# Patient Record
Sex: FEMALE | Race: WHITE
[De-identification: names, ages, dates, MRNs, and addresses within clinical notes are randomized per-mention and may not be internally consistent; named-entity substitution may affect disease eponyms.]

---

## 2018-04-20 ENCOUNTER — HOSPITAL ENCOUNTER (EMERGENCY)
Dept: HOSPITAL 25 - UCCORT | Age: 68
Discharge: HOME | End: 2018-04-20
Payer: MEDICARE

## 2018-04-20 VITALS — DIASTOLIC BLOOD PRESSURE: 82 MMHG | SYSTOLIC BLOOD PRESSURE: 142 MMHG

## 2018-04-20 DIAGNOSIS — Z88.0: ICD-10-CM

## 2018-04-20 DIAGNOSIS — M43.6: Primary | ICD-10-CM

## 2018-04-20 DIAGNOSIS — Z88.8: ICD-10-CM

## 2018-04-20 DIAGNOSIS — M54.12: ICD-10-CM

## 2018-04-20 DIAGNOSIS — Z87.891: ICD-10-CM

## 2018-04-20 PROCEDURE — G0463 HOSPITAL OUTPT CLINIC VISIT: HCPCS

## 2018-04-20 PROCEDURE — 99212 OFFICE O/P EST SF 10 MIN: CPT

## 2018-04-20 NOTE — UC
Neck Pain HPI





- HPI Summary


HPI Summary: 





patient woke this morning with pain in the right side of her neck and 

intermittent tingling down her hand, she is morbildly obese, forward head 

posture, pain along the upper trap and scalenes.  denies any facila droop, one 

side weakness or change in speech. 





- History of Current Complaint


Hx Obtained From: Patient, Family/Caretaker


Pregnant?: No


Onset/Duration Of Injury/Symptoms: Hours


Mechanism Of Injury: No Known Trauma


Timing: Constant - radiating painneck ache


Onset/Duration: Sudden Onset, Lasting Hours


Severity: Moderate


Pain Intensity: 4


Character: Sharp, Aching


Aggravating Factors: Position, Movement


Alleviating Factors: Position, Massage


Associated Signs & Symptoms: Positive: Paresthesia





<Erin Jackson - Last Filed: 04/20/18 14:14>





<Enedelia Her - Last Filed: 04/20/18 14:30>





- History of Current Complaint


Chief Complaint: UCGeneralIllness


Stated Complaint: PAIN BEHIND (R) EAR/NECK, COUGH


Time Seen by Provider: 04/20/18 13:18





- Allergies/Home Medications


Allergies/Adverse Reactions: 


 Allergies











Allergy/AdvReac Type Severity Reaction Status Date / Time


 


Penicillins Allergy  Hives Verified 04/20/18 13:22


 


piroxicam [From Feldene] Allergy  Blisters Verified 04/20/18 13:22











Home Medications: 


 Home Medications





Albuterol HFA INHALER* [Ventolin HFA Inhaler*] 1 puff INH Q4H PRN 04/20/18 [

History Confirmed 04/20/18]


Allopurinol TAB* [Zyloprim 100 MG TAB*] 200 mg PO DAILY 04/20/18 [History 

Confirmed 04/20/18]


Atorvastatin* [Lipitor*] 10 mg PO DAILY 04/20/18 [History Confirmed 04/20/18]


Beclomethasone 80 MCG MDI(NF) [Qvar 80 MCG MDI(NF)] 2 puff INH DAILY 04/20/18 [

History Confirmed 04/20/18]


Ferrous Sulfate TAB* 325 mg PO DAILY 04/20/18 [History Confirmed 04/20/18]


Furosemide TAB* [Lasix TAB*] 40 mg PO DAILY 04/20/18 [History Confirmed 04/20/18

]


Gabapentin CAP(*) [Neurontin 100 mg CAP(*)] 100 mg PO BID 04/20/18 [History 

Confirmed 04/20/18]


Nystatin CREAM* 1 applic TOPICAL TID 04/20/18 [History Confirmed 04/20/18]


Pantoprazole TAB (NF) [Protonix TAB (NF)] 40 mg PO DAILY 04/20/18 [History 

Confirmed 04/20/18]


Potassium Chlor TAB* [Klor Con ER TAB*] 10 meq PO DAILY 04/20/18 [History 

Confirmed 04/20/18]


Spironolactone TAB* [Aldactone TAB*] 12.5 mg PO DAILY 04/20/18 [History 

Confirmed 04/20/18]


Tiotropium CAP.INH* [Spiriva CAP.INH*] 2 cap.inh INH DAILY 04/20/18 [History 

Confirmed 04/20/18]


amLODIPine TAB* [Norvasc 5 mg TAB*] 5 mg PO DAILY 04/20/18 [History Confirmed 04 /20/18]











PMH/Surg Hx/FS Hx/Imm Hx


Previously Healthy: Yes





- Surgical History


Surgical History: Yes


Surgery Procedure, Year, and Place: stents.  T&A.  marta.  appy.  L carotid 

[place] of [place] coil.  Kidney stone blasted.  2015 bypass.  mitral valve repair





- Family History


Known Family History: Positive: Cardiac Disease, Hypertension





- Social History


Alcohol Use: Occasionally


Substance Use Type: None


Smoking Status (MU): Former Smoker


Type: Cigarettes


When Did the Patient Quit Smoking/Using Tobacco: 30 years ago





- Immunization History


Most Recent Influenza Vaccination: 2014


Most Recent Tetanus Shot: current (2010?)


Most Recent Pneumonia Vaccination: current





<Erin Jackson - Last Filed: 04/20/18 14:14>





Review Of Systems


Constitutional: Positive: Negative


Skin: Positive: Negative


Eyes: Positive: Negative


ENT: Positive: Negative


Respiratory: Positive: Negative


Cardiovascular: Positive: Negative


Gastrointestinal: Positive: Negative


Genitourinary: Positive: Negative


Musculoskeletal: Positive: Arthralgia, Decreased ROM, Myalgia


Neurological: Positive: Negative


Psychological: Positive: Negative


All Other Systems Reviewed And Are Negative: Yes





<Erin Jackson - Last Filed: 04/20/18 14:14>





Physical Exam


Triage Information Reviewed: Yes


Appearance: Well-Appearing, Pain Distress, Obese


Vital Signs: 


 Initial Vital Signs











Temp  98 F   04/20/18 13:20


 


Pulse  71   04/20/18 13:20


 


Resp  20   04/20/18 13:20


 


BP  142/82   04/20/18 13:20


 


Pulse Ox  99   04/20/18 13:20











Vital Signs Reviewed: Yes


Eye Exam: Normal


ENT Exam: Normal


Dental Exam: Normal


Neck: Positive: Tenderness @ - right side of neck musculature


Respiratory Exam: Normal


Respiratory: Positive: Chest non-tender, Lungs clear, Normal breath sounds


Cardiovascular Exam: Normal


Cardiovascular: Positive: RRR, No Murmur, Pulses Normal


Abdominal Exam: Normal


Abdomen Description: Positive: Nontender, No Organomegaly, Soft


Bowel Sounds: Positive: Present


Musculoskeletal Exam: Normal


Musculoskeletal: Positive: Strength Intact, No Edema, ROM Limited @ - in neck 

rot, lat flx and ext


Neurological Exam: Normal


Neurological: Positive: Alert


Psychological Exam: Normal


Skin Exam: Normal





<Erin Jackson - Last Filed: 04/20/18 14:14>


Vital Signs: 


 Initial Vital Signs











Temp  98 F   04/20/18 13:20


 


Pulse  71   04/20/18 13:20


 


Resp  20   04/20/18 13:20


 


BP  142/82   04/20/18 13:20


 


Pulse Ox  99   04/20/18 13:20














<Enedelia Her - Last Filed: 04/20/18 14:30>





Neck Pain Course/Dx





- Course


Course Of Treatment: hx obtained, exam performed ,meds reviewed, ROM assessed, 

treated for cervical radicuopathy





- Differential Dx/Diagnosis


Differential Dx/HQI/PQRI: Cervical Fracture, Sprain, Strain, Torticollis


Provider Diagnoses: torticollis.  cervical radicuopathy





<Erin Jackson - Last Filed: 04/20/18 14:14>





Discharge





- Sign-Out/Discharge


Documenting (check all that apply): Discharge





- Billing Disposition and Condition


Condition: STABLE


Disposition: HOME





<Erin Jackson - Last Filed: 04/20/18 14:14>





- Billing Disposition and Condition


Condition: STABLE


Disposition: HOME





<Enedelia Her - Last Filed: 04/20/18 14:30>





- Discharge Plan


Condition: Stable


Disposition: HOME


Patient Education Materials:  Cervical Radiculopathy (ED)


Referrals: 


Nicole Murdock MD [Primary Care Provider] - 


Additional Instructions: 


1. warm pack to the neck, use the sling to rest the muscles of the shoulder and 

neck


2. I recommend follow up with massage, gentle stretching, tylenol and sleep 

poisitioning


3. Follow up with any increased weakness or pain is not managed with this line 

of treatment





Attestation Statement


User Type: Provider - I was available for consult. This patient was seen by the 

CLAIRE. The patient was not presented to, seen by, or examined by me. -Laura





<Enedelia Her - Last Filed: 04/20/18 14:30>

## 2020-02-27 ENCOUNTER — HOSPITAL ENCOUNTER (EMERGENCY)
Dept: HOSPITAL 25 - UCCORT | Age: 70
Discharge: HOME | End: 2020-02-27
Payer: MEDICARE

## 2020-02-27 VITALS — SYSTOLIC BLOOD PRESSURE: 110 MMHG | DIASTOLIC BLOOD PRESSURE: 64 MMHG

## 2020-02-27 DIAGNOSIS — Z91.09: ICD-10-CM

## 2020-02-27 DIAGNOSIS — Z88.0: ICD-10-CM

## 2020-02-27 DIAGNOSIS — Z88.8: ICD-10-CM

## 2020-02-27 DIAGNOSIS — S61.512A: Primary | ICD-10-CM

## 2020-02-27 DIAGNOSIS — Z95.1: ICD-10-CM

## 2020-02-27 DIAGNOSIS — Z95.2: ICD-10-CM
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
## 2020-02-27 DIAGNOSIS — Z79.82: ICD-10-CM
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
## 2020-02-27 DIAGNOSIS — Z87.891: ICD-10-CM

## 2020-02-27 PROCEDURE — G0463 HOSPITAL OUTPT CLINIC VISIT: HCPCS

## 2020-02-27 PROCEDURE — 99212 OFFICE O/P EST SF 10 MIN: CPT

## 2020-02-27 NOTE — XMS REPORT
Continuity of Care Document (CCD)

 Created on:January 3, 2020



Patient:Teri Heaton

Sex:Female

:1950

External Reference #:[id number]





Demographics







 Address  [address]



   [place] [address]

 

 Home Phone  [phone]

 

 Mobile Phone  [phone]

 

 Email Address  [email or web address]

 

 Preferred Language  en

 

 Marital Status  Not  or 

 

 Mor[employer] Affiliation  Unknown

 

 Race  White

 

 Ethnic Group  Not  or 









Author







 Name  Nicole Murdock MD

 

 Address  [address] [place] [address]









Care Team Providers







 Name  Role  Phone

 

 Jimbo Haley MD - Cardiovascular  Care Team Information   [phone]
[phone]



 Disease    

 

 [facility]  Care Team Information   [phone]

 

 MD Landon, Pilo - Nephrology  Care Team Information   [phone]

 

 Inez Ponce DO - Hematology &  Care Team Information   [phone]
[phone]



 Oncology    

 

 Robert Sánchez MD - Cardiovascular  Care Team Information   [phone]
[phone]



 Disease    

 

 Radha Machado - Gastroenterology  Care Team Information   [phone]
[phone]

 

 Quinton Borja MD -  Care Team Information   [phone]



 Cardiovascular Disease    

 

 Maren Kerns - Gastroenterology  Care Team Information   [phone]
[phone]

 

 [facility] - Physical  Care Team Information   [phone]
[phone]



 Therapy    

 

 Onur Valencia MD  Care Team Information   [phone]

 

 Oren Roque MD - Vascular  Care Team Information   [phone]
[phone]



 Surgery    

 

 August Castillo MD - Allergy & Immunology  Care Team Information   [phone]
[phone]

 

 Bryan Layne DO - Pulmonary  Care Team Information   [phone]
[phone]



 Disease    

 

 Matt David DR - Hematology &  Care Team Information   [phone]
[phone]



 Oncology    

 

 Dany Loza DR - Otolaryngology  Care Team Information   [phone]
[phone]









Problems







 Active Problems  Provider  Date

 

 Proteinuria  Nicole Murdokc MD  Onset: 10/14/2014

 

 Coronary arteriosclerosis  Nicole Murdock MD  Onset: 10/14/2014

 

 Hypothyroidism  Nicole Murdock MD  Onset: 2011

 

 Obstructive sleep apnea syndrome  Nicole Murdock MD  Onset: 03/10/2011

 

 Allergic rhinitis due to pollen  Nicole Murdock MD  Onset: 2010

 

 Candidiasis of skin and nails  Nicole Murdock MD  Onset: 2010

 

 Senile hyperkeratosis  Nicole Murdock MD  Onset: 2008

 

 Allergic asthma without status asthmaticus  Nicole Murdock MD  Onset: 2007

 

 Vitamin D deficiency  Nicole Murdock MD  Onset: 2006

 

 Osteochondropathy  Nicole Murdock MD  Onset: 2006

 

 Liver function tests abnormal  Nicole Murdock MD  Onset: 2006

 

 Helicobacter pylori  Nicole Murdock MD  Onset: 2006

 

 Carotid artery occlusion  Nicole Murdock MD  Onset: 2006

 

 Aortic valve disorder  Nicole Murdock MD  Onset: 09/15/2005

 

 Benign neoplasm of colon  Nicole Murdock MD  Onset: 2005

 

 Melena  Nicole Murdock MD  Onset: 2005

 

 Benign essential hypertension  Nicole Murdock MD  Onset: 2005

 

 Mixed hyperlipidemia  Nicole Murdock MD  Onset: 2005

 

 Type 2 diabetes mellitus  Nicole Murdock MD  Onset: 2005

 

 Pure hypercholesterolemia    Onset: 2015

 

 Osteoporosis  Nicole Murdock MD  Onset: 2015

 

 Chronic kidney disease stage 3  Nicole Murdock MD  Onset: 2015

 

 Essential hypertension  Nicole Murdock MD  Onset: 2015

 

 Mitral valve disorder  Nicole Murdock MD  Onset: 2015

 

 Ulcerative colitis  Nicole Murdock MD  Onset: 2015

 

 Atherosclerotic heart disease of native  Nicole Murdock MD  Onset: 2015



 coronary artery without angina pectoris    

 

 Sinus node dysfunction  Nicole Murdock MD  Onset: 2016

 

 Sleep apnea  Nicole Murdock MD  Onset: 2016

 

 Type 2 diabetes mellitus with diabetic  Nicole Murdock MD  Onset: 2016



 polyneuropathy    

 

 Diabetes mellitus due to underlying condition  Nicole Murdock MD  Onset: 



 with mild nonproliferative diabetic    



 retinopathy without macular edema    

 

 Gout  Nicole Murdock MD  Onset: 2016

 

 Ex-smoker  Nicole Murdock MD  Onset: 2016

 

 Uncomplicated moderate persistent asthma  Nicole Murdock MD  Onset: 2017

 

 Diabetes mellitus  Nicole Murdock MD  Onset: 2018

 

 Heart valve replacement  Nicole Murdock MD  Onset: 2018

 

 Chronic diastolic heart failure  Nicole Murdock MD  Onset: 2018

 

 Congenital anomaly of peripheral blood vessel  Nicole Murdock MD  Onset: 

 

 Allergic rhinitis  Nicole Murdock MD  Onset: 2018

 

 Morbid obesity  Nicole Murdock MD  Onset: 2018

 

 Mild nonproliferative diabetic retinopathy  Nicole Murdock MD  Onset: 2018

 

 Cardiac pacemaker in situ  Nicole Murdock MD  Onset: 2019







Social History







 Type  Date  Description  Comments

 

 Birth Sex    Unknown  

 

 ETOH Use    Occasionally consumes  



     alcohol  

 

 Tobacco Use  Start: 61  Patient is a former  3/13/17 started age



   End: 84  smoker  12, started as a few



       per day, gradually



       built, by adult was



       1ppd , smoked up to



       2.5 ppd for at least



       3-4 years, quit .



       Not eligible for lung



       cancer screening. [facility]



       Document: 17 -



       Followup: [facility]



       

 

 Smoking Status  Reviewed:  Patient is a former  3/13/17 started age



   18  smoker  13, started as a few



       per day, gradually



       built, by adult was



       1ppd , smoked up to



       2.5 ppd for at least



       3-4 years, quit .



       Not eligible for lung



       cancer screening. [facility]



       Document: 17 -



       Followup: [facility]



       

 

 Exercise    Exercises sporadically  counselled 150min per



 Type/Frequency      week 70045 steps per



       day  3/13/17







Allergies, Adverse Reactions, Alerts







 Active Allergies  Reaction  Severity  Comments  Date

 

 Penicillin      Rash  2015

 

 Latex        2017

 

 Feldene      Blisters  2015

 

 Penicillin G        2016

 

 Dogs        2015

 

 Milk-Related Compounds        2015

 

 Pollen        2015

 

 Pollen Extract        2015

 

 Piroxicam  Rash  Mild    2014

 

 Penicillins  Hives, Rash  Mild    2014

 

 Dust        2018

 

 Environmental        2018

 

 Trees        2018

 

 Mold        2019







Medications







 Active Medications  SIG  Qnty  Indications  Ordering Provider  Date

 

 Furosemide  1 by mouth  30tabs  N18.3  Nicole Murdock,  2019



         40mg Tablets  every day in      MD  



   the        



   morning(instead        



   of 80mg daily)        









 I10









 Allopurinol  1 by mouth every  30tabs  M10.9  Nicole Murdock MD  2019



         300mg Tablets  day        



           

 

 Atorvastatin Calcium  take 1 tablet by  30tabs  E78.2  Nicole Murdock MD  



                  20mg  mouth every day        



 Tablets          



           









 I25.10









 Potassium Chloride ER  take 1 tablet by  30tabs  I10  Nicole Murdock MD  



                   10Meq  mouth daily with        



 Tablets ER  lasix        



           

 

 Spiriva Respimat  2 puffs in the    J45.40  August Castillo MD  2018



              2.5mcg/Act  morning        



 Aerosol          



           

 

 Levothyroxine Sodium  take 1 tablet  30tabs  E03.9  Nicole Murdock MD  



                  125mcg  daily in the        



 Tablets  morning on an        



   empty stomach. no        



   medications/ food        



   for 30 minutes        

 

 Fluticasone Propionate  use 2 sprays in    J30.9  August Castillo MD  2017



   each nostril        



 50mcg/Act Suspension  daily        



           

 

 Pantoprazole Sodium  take 1 tablet by  30tabs  D62  Nicole Murdock MD  



                 40mg  mouth every day        



 Tablets DR  30min before a        



   meal        









 I25.10









 Accu-Chek Lisseth Plus  test daily and as  100units  E11.8  Nicole Murdock,  



   needed      MD  



 Strips          



           

 

 Accu-Chek Lisseth  test daily and as  1units  E11.8  Nicole Murdock,  2016



 Connect  directed      MD  



      w/Device Kit          



           

 

 Vitamin D3  1 by mouth daily  90tabs  E55.9  Nicole Murdock,  2016



         1000Unit  with dinner with      MD  



 Tablets  meat fat oil        



           

 

 Nystatin  wash and dry  45gm  B37.2  Nicole Murdock,  2016



       [id number]  skin, apply 3      MD  



 Cream  times a day to        



   affected areas        



   until resolved        

 

 Casper Microlet Lancets  test daily and as  100units  E11.8  Nicole Murdock,  
2016



   needed      MD  



 Misc          

 

 Vitamin C  1 by mouth with  30units  D50.9  Nicole Murdock,  2015



        500mg Chewtabs  iron      MD  



           

 

 Multivitamins  1 by mouth every      Nicole Murdock,  2015



             Capsules  day      MD  



           

 

 Irbesartan  take 1 tablet by  30tabs  I25.10  Nicole Murdock,  10/09/2015



         75mg Tablets  mouth every day      MD  



   at bedtime        









 I10









 Apap  care with     G47.30  Nicole Murdock MD  2015



  Device  cata        

 

 Montelukast Sodium  1 po qhs  28tabs  J30.9  August Castillo MD  2010



               10mg          



 Tablets          

 

 Ventolin HFA  2 puffs by mouth  1units  J45.40  Bryan Layne,   



         108mcg/Act  every 4 hours as        



 Aerosol  needed        

 

 Pataday  one drop each    J30.9  Garfield Maldonado,  



    0.2% Solution  eye every day      M.D.  



           

 

 Spironolactone  1 tablet  by    I25.10  Quinton Borja,  



           25mg Tablets  mouth every day      MD  



           









 I10









 Aspirin Low Dose  chew 81 mg daily    E08.329  Nicole Murdock MD  



           81mg Chewtabs          



           









 I25.10









 Azelastine HCL (Nasal)  1-2 sprays into each    J30.9  August Castillo MD  



                    0.1%  nostril 2 (two)        



 Solution  times a day Use in        



   each nostril as        



   directed        

 

 Clindamycin HCL  2 PO 1 Hour Prior To    Z95.2  Quinton Borja MD  



             300mg  Dental Procedures.        



 Capsules          



           

 

 Xolair  inject 225mg every 2    J45.40  Unknown  



    75mg/0.5ML Soln  weeks        



 Prefill Syringe          



           

 

 Dulera  2 puff twice a day    J45.40  Unknown  



    100-5mcg/Act Aerosol          



           

 

 Levalbuterol HCL  Inhale 1 Vial Via    J45.40  Unknown  



              1.25mg/3ML  Nebulizer Every 4 To        



 Nebulizer  6 Hours For        



   Shortness Of Breath,        



   Cough Or Wheeze        







Medications Administered in Office







 Medication  SIG  Qnty  Indications  Ordering Provider  Date

 

 Gentamicin <80 MG        Schedule, Nurses  2017



       Injection          

 

 Depo Medrol 40 MG        Nicole Murdock MD  2016



       Injection          







Immunizations







 CPT Code  Status  Date  Vaccine  Reaction  Lot #

 

 [id number]  Given  2019  Influenza Vac, Quadrivalent,    



       Split, 0.5mL Dosage, Im Use    

 

 26753  Given  2018  Influenza Virus    



       Vaccine,Quadrivalent,Split,Preser    



       v Free, 0.5mL,Im    

 

   Given  2017  Afluria Imunization  Given At Pharmacy  

 

 29335  Given  2016  Influenza Virus    



       Vaccine,Quadrivalent,Split,Preser    



       v Free, 0.5mL,Im    

 

 70058  Given  2015  Influenza Virus    



       Vaccine,Quadrivalent,Split,Preser    



       v Free, 0.5mL,Im    

 

 88731  Given  2015  Prevnar 13 Pneumococal Conjugate    [id number]



       Vaccine    

 

 54612  Given  2015  Zoster (Zostavax)    

 

 86550  Given  10/14/2014  Pneumococcal 23 Immunization    



       Adult Or Immunosuppressed Patient    

 

 90926  Given  10/01/2014  Influenza Virus    



       Vaccine,Quadrivalent,Split,Preser    



       v Free, 0.5mL,Im    

 

 46011  Given  2014  Influenza Intranasal Vaccine,    



       Live Virus    

 

 26079  Given  2012  Afluria Or Fluvirin Flu Vac    



       Intramuscular    

 

 05054  Given  2012  Tdap (Adacel) Ages 7 And Above  ADACEL  



       Only    

 

 39547  Given  2012  Pneumococcal 23 Immunization    



       Adult Or Immunosuppressed Patient    

 

 16459  Given  2011  Afluria Or Fluvirin Flu Vac    



       Intramuscular    

 

 77621  Given  10/18/2010  Afluria Or Fluvirin Flu Vac    



       Intramuscular    

 

 81801  Given  2008  Pneumococcal 23 Immunization    



       Adult Or Immunosuppressed Patient    

 

 69684  Given  11/10/2004  Tetanus And Diptheria Toxoid 7    



       Years And Older Preserv Free    

 

 16718  Given  10/20/1999  Pneumococcal 23 Immunization    



       Adult Or Immunosuppressed Patient    









 









 80156  Refused  2020  Shingrix (Shingles) Zoster  AWARE CAN GET AT 
PHARMACY  



       Vaccine HZV, Recombinant,    



       Subunit, Adj    

 

 75821  Refused  2019  Shingrix (Shingles) Zoster  aware needs in 2020  



       Vaccine HZV, Recombinant,    



       Subunit, Adj    

 

 66625  Refused  2018  Shingrix (Shingles) Zoster  not due until   



       Vaccine HZV, Recombinant,    



       Subunit, Adj    

 

   Refused  2018  Flu Vaccine NOS  will get at free clinic on  



           







Vital Signs







 Date  Vital  Result  Comment

 

 2020 10:04am  Weight  238.00 lb  









 Heart Rate  76 /min  

 

 BP Systolic  142 mmHg  

 

 BP Diastolic  86 mmHg  

 

 Respiratory Rate  20 /min  

 

 Height  64.5 inches  5'4.50"

 

 BMI (Body Mass Index)  40.2 kg/m2  









 2019  2:34pm  Body Temperature  97.1 F  









 Weight  241.00 lb  

 

 Heart Rate  70 /min  

 

 BP Systolic  138 mmHg  

 

 BP Diastolic  88 mmHg  

 

 Respiratory Rate  20 /min  

 

 Height  64.5 inches  5'4.50"

 

 O2 % BldC Oximetry  99 %  ra

 

 BMI (Body Mass Index)  40.7 kg/m2  







Results







 Test  Acquired Date  Facility  Test  Result  H/L  Range  Note

 

 Hemoglobin A1c  2019  [facility]  Hemoglobin A1c  5.6 %    4.1-5.9  









 Estimated Average Glucose Calc  114 mg/dL      









 Laboratory test finding  2019  [facility]  TSH  2.53 uIU/mL    0.35-4.94  

 

 Lipid  2019  [facility]  Cholesterol  159 mg/dL      









 Triglycerides  199 mg/dL      

 

 HDL  43 mg/dL    35-85  1

 

 Chol/ HDL Ratio  3.7 ratio    3.7-5.6  

 

 VLDL  40 mg/dL  High  2-29  

 

 LDL (Calc)  76 mg/dL    20-99  2









 Laboratory test finding  2019  [facility]  CPK  109 U/L      









 Uric Acid  5.4 mg/dL    2.6-7.6  









 Comprehensive Met Panel-[facility]CMG  2019  [facility]  Sodium  142 mmol/L    135-
146  3









 Potassium  4.2 mmol/L    3.5-5.2  

 

 Chloride#  104 mmol/L      4

 

 Carbon Dioxide  26 mmol/L    24-34  

 

 Calcium  8.9 mg/dL    8.5-10.5  5

 

 Glucose  111 mg/dL  High    

 

 BUN  47 mg/dL  High  6-26  

 

 Creatinine  2.2 mg/dL  High  0.5-1.4  

 

 Total Protein  6.2 g/dL    6.0-8.0  

 

 Albumin  4.1 g/dL    3.6-4.9  

 

 Globulin  2.1 g/dL    2.0-3.5  

 

 A/G Ratio  2.0 Ratio    1.0-2.2  

 

 Total Bilirubin  0.4 mg/dL    0.1-1.3  

 

 Alkaline Phosphatase  122 U/L      

 

 Alt  19 U/L    3-42  

 

 Ast  20 U/L    8-42  

 

 Anion Gap  12 mmol/L    5-15  6

 

 Female Egfr  22  Low  >60  7

 

 Male Egfr  29  Low  >60  8









 CBC With Auto Diff  2019  [facility]  WBC  8.1 K/uL    4.1-11.0  9









 RBC  3.72 M/uL  Low  4.00-5.40  10

 

 Hemoglobin  11.6 gm/dL  Low  12.0-16.0  11

 

 Hematocrit  35.9 %  Low  36.0-47.0  12

 

 MCV  96.5 fL  High  80.0-95.0  13

 

 MCH  31.3 pg    27.0-32.0  14

 

 MCHC  32.4 g/dL    32.0-36.0  15

 

 RDW  22.3 %  High  10.5-14.5  16

 

 PLT Count  196 K/ul    150-400  17

 

 MPV  8.7 FL    7.1-10.7  









 Manual Differential  2019  [facility]  Neutrophils  73 %    35-75  









 Band  0 %    0-11  

 

 Lymphocytes  18 %    16-52  

 

 Monocytes  7 %    0-8  

 

 Eosinophils  2 %    0-5  

 

 Basophils  0 %    0-4  

 

 Abs Neutrophils#  5.9 K/ul    1.8-7.7  

 

 Abs Lymphocytes#  1.5 K/ul    1.2-4.8  

 

 Abs Monocytes#  0.6 K/ul    0.0-0.8  

 

 Abs Eosinophils#  0.2 K/ul    0.0-0.5  

 

 Abs Basophils#  0.0 K/ul    0.0-0.3  

 

 Abs BandCells#  0.0 K/ul    0.0-1.2  

 

 Anisocytosis  3    None Seen  

 

 Macrocytosis  2    None Seen  

 

 Microcytosis  1    None Seen  

 

 Platelet Estimate  NORMAL    Normal  

 

 RBC Morphology  ABNORMAL  Abnormal  Normal  









 Iron Panel  2019  [facility]  Iron, Total  40 g/dL  Low    









 Transferrin  214.0 mg/dL    203.0-362.0  

 

 Tibc (calc)  300 g/dL    261-478  

 

 % Iron Saturation  13.4 %    13.0-45.0  









 Hemoglobin A1c  2019  [facility]  Hemoglobin A1c  6.1 %  High  4.1-5.9  









 Estimated Average Glucose Calc  128 mg/dL      









 Laboratory test finding  2019  [facility]  TSH  0.69 uIU/mL    0.35-4.94  

 

 Lipid  2019  [facility]  Cholesterol  140 mg/dL      









 Triglycerides  186 mg/dL      

 

 HDL  35 mg/dL    35-85  18

 

 Chol/ HDL Ratio  4.1 ratio    3.7-5.6  

 

 VLDL  37 mg/dL  High  2-29  

 

 LDL (Calc)  68 mg/dL    20-99  19









 Laboratory test finding  2019  [facility]  CPK  64 U/L      









 Uric Acid  5.2 mg/dL    2.6-7.6  









 Comprehensive Met Panel-[facility]CMG  2019  [facility]  Sodium  131 mmol/L  Low  135
-146  20









 Potassium  4.4 mmol/L    3.5-5.2  

 

 Chloride#  92 mmol/L  Low    21

 

 Carbon Dioxide  25 mmol/L    24-34  

 

 Calcium  9.0 mg/dL    8.5-10.5  22

 

 Glucose  111 mg/dL  High    

 

 BUN  44 mg/dL  High  6-26  

 

 Creatinine  2.4 mg/dL  High  0.5-1.4  

 

 Total Protein  6.0 g/dL    6.0-8.0  

 

 Albumin  3.7 g/dL    3.6-4.9  

 

 Globulin  2.3 g/dL    2.0-3.5  

 

 A/G Ratio  1.6 Ratio    1.0-2.2  

 

 Total Bilirubin  0.4 mg/dL    0.1-1.3  

 

 Alkaline Phosphatase  109 U/L      

 

 Alt  13 U/L    3-42  

 

 Ast  15 U/L    8-42  

 

 Anion Gap  14 mmol/L    5-15  23

 

 Female Egfr  20  Low  >60  24

 

 Male Egfr  27  Low  >60  25









 CBC With Auto Diff  2019  [facility]  WBC  6.5 K/uL    4.1-11.0  









 RBC  3.23 M/uL  Low  4.00-5.40  

 

 Hemoglobin  9.8 gm/dL  Low  12.0-16.0  

 

 Hematocrit  30.2 %  Low  36.0-47.0  

 

 MCV  93.4 fL    80.0-97.0  

 

 MCH  30.3 pg    27.0-32.0  

 

 MCHC  32.4 g/dL    32.0-36.0  

 

 RDW  17.5 %  High  11.5-14.5  

 

 PLT Count  301 K/ul    140-400  

 

 MPV  7.5 FL    7.1-10.7  









 Manual Differential  2019  [facility]  Neutrophils  78 %  High  35-75  









 Band  0 %    0-11  

 

 Lymphocytes  13 %  Low  16-52  

 

 Monocytes  3 %    0-8  

 

 Eosinophils  6 %  High  0-5  

 

 Basophils  0 %    0-4  

 

 Abs Neutrophils#  5.1 K/ul    1.8-7.7  

 

 Abs Lymphocytes#  0.8 K/ul  Low  1.2-4.8  

 

 Abs Monocytes#  0.2 K/ul    0.0-0.8  

 

 Abs Eosinophils#  0.4 K/ul    0.0-0.5  

 

 Abs Basophils#  0.0 K/ul    0.0-0.3  

 

 Abs BandCells#  0.0 K/ul    0.0-1.2  

 

 Anisocytosis  1    None Seen  

 

 Macrocytosis  1    None Seen  

 

 Platelet Estimate  NORMAL    Normal  

 

 Polychromasia  1    None Seen  

 

 Spherocytes  FEW  Abnormal  None Seen  









 Iron Panel  2019  [facility]  Iron, Total  20 g/dL  Low    









 Transferrin  252.0 mg/dL    203.0-362.0  

 

 Tibc (calc)  353 g/dL    261-478  

 

 % Iron Saturation  5.7 %  Low  13.0-45.0  









 1  Per NCEP ATP III Guidelines:



   Results lower than 40 mg/dL are suggestive of increased risk for



   coronary artery disease.  Results > or = to 60 mg/dL are considered a



   negative risk factor.

 

 2  Per NCEP ATP III Guidelines:



   Normal Population <130



   Patients with medical conditions: CHD/DM



   Optimal: <100



   Borderline high: 130-159



   High: 160-189



   Very high: >189

 

 3  Updated reference range on new analyzer 3-2017

 

 4  Updated reference range on new analyzer 3-2017

 

 5  Updated reference range 2019

 

 6  Updated Reference Range 

 

 7  Concerning GFR Guidelines for  Americans:



   Normal function or mild renal disease, if clinically at risk:  >/= 60



   mL/min



   Moderately decreased:  30-59



   Severely decreased:  15-29



   Renal failure:  <15



   There is reduced accuracy above 60ml/min/1.73 m squared, but the



   numeric value may be clinically useful in the near 60 range

 

 8  Concerning GFR Guidelines:



   Normal function or mild renal disease, if clinically at risk:  >/= 60



   mL/min



   Moderately decreased:  30-59



   Severely decreased:  15-29



   Renal failure:  <15



   There is reduced accuracy above 60ml/min/1.73 m squared, but the



   numeric value may be clinically useful in the near 60 range



   



   Glomerular Filtration Rate (GFR) is estimated based on the CKD-EPI



   equation, which assumes a steady state for creatinine as recommended



   by the National Kidney Disease Education Program in conjunction with



   the National Institutes of Health and the National Kidney Foundation.



   



   Clinical conditions in which it may be necessary to measure GFR by



   using clearance methods include extremes of age and body size, severe



   malnutrition or obesity, diseases of skeletal muscle, paraplegia or



   quadriplegia, vegetarian diet, rapidly changing kidney function, and



   calculation of the dose of potentially toxic drugs that are excreted



   by the kidneys.

 

 9  Updated Reference Range 2019

 

 10  Updated Reference Range 2019

 

 11  Updated Reference Range 2019

 

 12  Updated Reference Range 2019

 

 13  Updated Reference Range 2019

 

 14  Updated Reference Range 2019

 

 15  Updated Reference range 2019

 

 16  Updated Reference range 2019

 

 17  Updated Reference Range 2019

 

 18  Per NCEP ATP III Guidelines:



   Results lower than 40 mg/dL are suggestive of increased risk for



   coronary artery disease.  Results > or = to 60 mg/dL are considered a



   negative risk factor.

 

 19  Per NCEP ATP III Guidelines:



   Normal Population <130



   Patients with medical conditions: CHD/DM



   Optimal: <100



   Borderline high: 130-159



   High: 160-189



   Very high: >189

 

 20  Updated reference range on new analyzer 3-2017

 

 21  Updated reference range on new analyzer 3-2017

 

 22  Updated reference range 2019

 

 23  Updated Reference Range 

 

 24  Concerning GFR Guidelines for  Americans:



   Normal function or mild renal disease, if clinically at risk:  >/= 60



   mL/min



   Moderately decreased:  30-59



   Severely decreased:  15-29



   Renal failure:  <15



   There is reduced accuracy above 60ml/min/1.73 m squared, but the



   numeric value may be clinically useful in the near 60 range

 

 25  Concerning GFR Guidelines:



   Normal function or mild renal disease, if clinically at risk:  >/= 60



   mL/min



   Moderately decreased:  30-59



   Severely decreased:  15-29



   Renal failure:  <15



   There is reduced accuracy above 60ml/min/1.73 m squared, but the



   numeric value may be clinically useful in the near 60 range



   



   Glomerular Filtration Rate (GFR) is estimated based on the CKD-EPI



   equation, which assumes a steady state for creatinine as recommended



   by the National Kidney Disease Education Program in conjunction with



   the National Institutes of Health and the National Kidney Foundation.



   



   Clinical conditions in which it may be necessary to measure GFR by



   using clearance methods include extremes of age and body size, severe



   malnutrition or obesity, diseases of skeletal muscle, paraplegia or



   quadriplegia, vegetarian diet, rapidly changing kidney function, and



   calculation of the dose of potentially toxic drugs that are excreted



   by the kidneys.







Procedures







 Date  Code  Description  Status

 

 2019  12123  Measure Blood Oxygen Level Single Determination  Completed

 

 2019  81524  Admin Of Inj (Therapeutic Phrophylactic Or Diagnostic  
Completed



     Subq Inj  

 

 2018  63792641  Colonoscopy  Completed

 

 2017  [id number]  Mammogram  Completed

 

 2015  [id number]  Mammogram  Completed

 

 2015  [id number]  Bone Mineral Density Test  Completed

 

 2013  [id number]  Diabetic Retinal Eye Exam  Completed

 

 07/15/2011  [id number]  Bone Mineral Density Test  Completed







[employer]







 Description

 

 No Information Available







Encounters







 Type  Date  Location  Provider  Dx  Diagnosis

 

 Office Visit  2019  [facility]  Nicole Murdock,  E11.8  Type 2 diabetes



   2:30p    MD    mellitus with



           unspecified



           complications









 E78.2  Mixed hyperlipidemia

 

 M10.9  Gout, unspecified

 

 D64.9  Anemia, unspecified

 

 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

 

 E03.9  Hypothyroidism, unspecified

 

 Z95.2  Presence of prosthetic heart valve

 

 I50.32  Chronic diastolic (congestive) heart failure

 

 I25.10  Athscl heart disease of native coronary artery w/o ang pctrs

 

 I10  Essential (primary) hypertension

 

 J45.40  Moderate persistent asthma, uncomplicated

 

 E66.01  Morbid (severe) obesity due to excess calories

 

 I65.23  Occlusion and stenosis of bilateral carotid arteries

 

 N18.3  Chronic kidney disease, stage 3 (moderate)

 

 K51.80  Other ulcerative colitis without complications

 

 I49.5  Sick sinus syndrome

 

 Z95.0  Presence of cardiac pacemaker

 

 G47.30  Sleep apnea, unspecified

 

 Z87.891  Personal history of nicotine dependence

 

 K63.5  Polyp of colon

 

 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

 

 E11.3293  Type 2 diab with mild nonp rtnop without macular edema, bi

 

 M81.8  Other osteoporosis without current pathological fracture

 

 J30.9  Allergic rhinitis, unspecified

 

 Z68.41  Body mass index (BMI) 40.0-44.9, adult









 Office Visit  2019 11:00a  [facility]  Schedule, Nurses  Z95.2  Presence of 
prosthetic



           heart valve









 I50.32  Chronic diastolic (congestive) heart failure

 

 I25.10  Athscl heart disease of native coronary artery w/o ang pctrs







Assessments







 Date  Code  Description  Provider

 

 2020  E11.8  Type 2 diabetes mellitus with unspecified  Nicole Murdock MD



     complications  

 

 2020  E03.9  Hypothyroidism, unspecified  Nicole Murdock MD

 

 2020  E78.2  Mixed hyperlipidemia  Nicole Murdock MD

 

 2020  M10.9  Gout, unspecified  Nicole Murdock MD

 

 2020  D64.9  Anemia, unspecified  Nicole Murdock MD

 

 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



     loss (chronic)  

 

 2020  E66.01  Morbid (severe) obesity due to excess  Nicole Murdock MD



     calories  

 

 2020  Z95.2  Presence of prosthetic heart valve  Nicole Murdock MD

 

 2020  I50.32  Chronic diastolic (congestive) heart failure  Nicole Murdock MD

 

 2020  I25.10  Atherosclerotic heart disease of native  Nicole Murdock MD



     coronary artery without angina pectoris  

 

 2020  I10  Essential (primary) hypertension  Nicole Murdock MD

 

 2020  J45.40  Moderate persistent asthma, uncomplicated  Nicole Murdock MD

 

 2020  I65.23  Occlusion and stenosis of bilateral carotid  Nicole Murdock MD



     arteries  

 

 2020  N18.3  Chronic kidney disease, stage 3 (moderate)  Nicole Murdock MD

 

 2020  K51.80  Other ulcerative colitis without  Nicole Murdock MD



     complications  

 

 2020  I49.5  Sick sinus syndrome  Nicole Murdock MD

 

 2020  Z95.0  Presence of cardiac pacemaker  Nicole Murdock MD

 

 2020  G47.30  Sleep apnea, unspecified  Nicole Murdock MD

 

 2020  Z87.891  Personal history of nicotine dependence  Nicole Murdock MD

 

 2020  K63.5  Polyp of colon  Nicole Murdock MD

 

 2020  E11.21  Type 2 diabetes mellitus with diabetic  Nicole Murdock MD



     nephropathy  

 

 2020  E11.3293  Type 2 diabetes mellitus with mild  Nicole Murdock MD



     nonproliferative diabetic  

 

 2020  M81.8  Other osteoporosis without current  Nicole Murdock MD



     pathological fracture  

 

 2020  J30.9  Allergic rhinitis, unspecified  Nicole Murdock MD

 

 2020  Z12.31  Encounter for screening mammogram for  Nicole Murdock MD



     malignant neoplasm of breast  

 

 2020  Z68.41  Body mass index (BMI) 40.0-44.9, adult  Nicole Murdock MD

 

 2019  E11.8  Type 2 diabetes mellitus with unspecified  Nicole Murdock MD



     complications  

 

 2019  E11.8  Type 2 diabetes mellitus with unspecified  Schedule, 
Laboratory



     complications  

 

 2019  E03.9  Hypothyroidism, unspecified  Nicole Murdock MD

 

 2019  E03.9  Hypothyroidism, unspecified  Schedule, Laboratory

 

 2019  E78.2  Mixed hyperlipidemia  Nicole Murdock MD

 

 2019  E78.2  Mixed hyperlipidemia  Schedule, Laboratory

 

 2019  M10.9  Gout, unspecified  Nicole Murdock MD

 

 2019  M10.9  Gout, unspecified  Schedule, Laboratory

 

 2019  D64.9  Anemia, unspecified  Nicole Murdock MD

 

 2019  D64.9  Anemia, unspecified  Schedule, Laboratory

 

 2019  Z68.41  Body mass index (BMI) 40.0-44.9, adult  Nicole Murdock MD

 

 2019  Z68.41  Body mass index (BMI) 40.0-44.9, adult  Schedule, 
Laboratory

 

 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



     loss (chronic)  

 

 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 
Laboratory



     loss (chronic)  

 

 2019  E11.8  Type 2 diabetes mellitus with unspecified  [facility]



     complications  

 

 2019  E03.9  Hypothyroidism, unspecified  [facility]

 

 2019  E78.2  Mixed hyperlipidemia  [facility]

 

 2019  M10.9  Gout, unspecified  [facility]

 

 2019  D64.9  Anemia, unspecified  [facility]

 

 2019  Z68.41  Body mass index (BMI) 40.0-44.9, adult  [facility]

 

 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



     loss (chronic)  

 

 2019  E11.8  Type 2 diabetes mellitus with unspecified  Nicole Murdock MD



     complications  

 

 2019  E78.2  Mixed hyperlipidemia  Nicole Murdock MD

 

 2019  M10.9  Gout, unspecified  Nicole Murdock MD

 

 2019  D64.9  Anemia, unspecified  Nicole Murdock MD

 

 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



     loss (chronic)  

 

 2019  E03.9  Hypothyroidism, unspecified  Nicole Murdock MD

 

 2019  Z95.2  Presence of prosthetic heart valve  Nicole Murdock MD

 

 2019  I50.32  Chronic diastolic (congestive) heart failure  Nicole Murdock MD

 

 2019  I25.10  Atherosclerotic heart disease of native  Nicole Murdock MD



     coronary artery without angina pectoris  

 

 2019  I10  Essential (primary) hypertension  Nicole Murdock MD

 

 2019  J45.40  Moderate persistent asthma, uncomplicated  Nicole Murdock MD

 

 2019  E66.01  Morbid (severe) obesity due to excess  Nicole Murdock MD



     calories  

 

 2019  I65.23  Occlusion and stenosis of bilateral carotid  Nicole Murdock MD



     arteries  

 

 2019  N18.3  Chronic kidney disease, stage 3 (moderate)  Nicole Murdock MD

 

 2019  K51.80  Other ulcerative colitis without  Nicole Murdock MD



     complications  

 

 2019  I49.5  Sick sinus syndrome  Nicole Murdock MD

 

 2019  Z95.0  Presence of cardiac pacemaker  Nicole Murdock MD

 

 2019  G47.30  Sleep apnea, unspecified  Nicole Murdock MD

 

 2019  Z87.891  Personal history of nicotine dependence  Nicole Murdock MD

 

 2019  K63.5  Polyp of colon  Nicole Murdock MD

 

 2019  E11.21  Type 2 diabetes mellitus with diabetic  Nicole Murdock MD



     nephropathy  

 

 2019  E11.3293  Type 2 diabetes mellitus with mild  Nicole Murdock MD



     nonproliferative diabetic  

 

 2019  M81.8  Other osteoporosis without current  Nicole Murdock MD



     pathological fracture  

 

 2019  J30.9  Allergic rhinitis, unspecified  Nicole Murdock MD

 

 2019  Z68.41  Body mass index (BMI) 40.0-44.9, adult  Nicole Murdock MD

 

 2019  E11.8  Type 2 diabetes mellitus with unspecified  Nicole Murdock MD



     complications  

 

 2019  E11.8  Type 2 diabetes mellitus with unspecified  Schedule, 
Laboratory



     complications  

 

 2019  E03.9  Hypothyroidism, unspecified  Nicole Murdock MD

 

 2019  E03.9  Hypothyroidism, unspecified  Schedule, Laboratory

 

 2019  E78.2  Mixed hyperlipidemia  Nicole Murdock MD

 

 2019  E78.2  Mixed hyperlipidemia  Schedule, Laboratory

 

 2019  M10.9  Gout, unspecified  Nicole Murdock MD

 

 2019  M10.9  Gout, unspecified  Schedule, Laboratory

 

 2019  D64.9  Anemia, unspecified  Nicole Murdock MD

 

 2019  D64.9  Anemia, unspecified  Schedule, Laboratory

 

 2019  Z68.41  Body mass index (BMI) 40.0-44.9, adult  Nicole Murdock MD

 

 2019  Z68.41  Body mass index (BMI) 40.0-44.9, adult  Schedule, 
Laboratory

 

 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



     loss (chronic)  

 

 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 
Laboratory



     loss (chronic)  

 

 2019  E11.8  Type 2 diabetes mellitus with unspecified  [facility]



     complications  

 

 2019  E03.9  Hypothyroidism, unspecified  [facility]

 

 2019  E78.2  Mixed hyperlipidemia  [facility]

 

 2019  M10.9  Gout, unspecified  [facility]

 

 2019  D64.9  Anemia, unspecified  [facility]

 

 2019  Z68.41  Body mass index (BMI) 40.0-44.9, adult  [facility]

 

 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



     loss (chronic)  

 

 2019  Z95.2  Presence of prosthetic heart valve  Nicole Murdock MD

 

 2019  Z95.2  Presence of prosthetic heart valve  Schedule, Nurses

 

 2019  I50.32  Chronic diastolic (congestive) heart failure  Nicole Murdock MD

 

 2019  I50.32  Chronic diastolic (congestive) heart failure  Schedule, 
Nurses

 

 2019  I25.10  Atherosclerotic heart disease of native  Nicole Murdock MD



     coronary artery without angina pectoris  

 

 2019  I25.10  Atherosclerotic heart disease of native  Schedule, Nurses



     coronary artery with  







Plan of Treatment

Future Appointment(s):2020  9:30 am - Schedule, Laboratory at [facility]2020 11:00 am - Nicole Murdock MD at [facility]2020 - Nicole Murdock MDE11.8 Type 2 diabetes mellitus with unspecified complicationsNew Labs:
Hemoglobin A1c, Scheduled: 20Comprehensive Met Panel-[facility], Scheduled: Comments:Diabetes with complications. Pt with stable control per Hba1c 
under 6  and fingerstick readings 110 - 135sat this time, no medications.  Diet 
controlled  Reviewed signs and symptoms of hypoglycemia andhow to treat, call 
if fingersticks are regularly under 80 or over 130. Please test fingersticks  
as needed when ill or prednisone is used. Occasionally check a fs 2 hours after 
a meal, and it should beunder 140. Please bring copies of fs to visitBe sure to 
check your feet daily and see the podiatristregularly. glyco goal under 
8continues statin therapysee the podiatrist periodically Be sure to see your 
eye doc annually.Referral:Garfield Maldonado M.D., OphthalmologyFollow up:mamm 
due now; next visit end of March for 30min annual [facility] wellness nonfasting 
labs 5 days ldlyuQ24.9 Hypothyroidism, unspecifiedNew Labs:TSH, Scheduled: Comments:hypothyroid--TSH/labs are stable. Continue replacement medication. 
Reviewed side effects.  Please call for concerns about over treatment or under 
treatment, with symptoms such as fatigue, change in bowel habits/skin/hair/nails
, temperature intolerance, weight changes, or other concerns.       Take meds 
in morning, first thing on arising, no food or drink or other meds for 
24ndfK00.2 Mixed hyperlipidemiaNew Labs:Lipid, Scheduled: 20CPK, Scheduled
: 20Comments:high cholPer AHA guidelines, due to known CAD, there is high 
cardiovascular riskShe has history of lfts elevating on statins, and currently 
has been taking atorvastatin 20mg.  Will continue dosing as she hasnot had any 
more concerns develop aspirin 81mg daily is also recommended. For lifestyle, we 
also recommend: Low fat ( under 30gm per day), low chol diet ( under 300mg per 
day chol)focus on lean meat, nonfat 1% dairy, increased veg and fruit, whole 
grains weight lossexercise build to at least 30min per day. Reviewed signs and 
symptoms of cardiovascular disease.M10.9 Gout, unspecifiedNew Labs:Uric Acid, 
Scheduled: 20Comments:gout--continue meds, symptoms controlled,  uric 
acid is under 6, I don't want to push meds if doesn't have symptoms, she has 
renal insufficiency Recheck uric acid periodically   may use colchicine as 
needed for acute attacks but has kidney disease so should call if feels she 
needs to take this, if sxsnot controlled within 3 days, should be seen.  if has 
severe pain, cant bear weight, or/ and fever, should be seen.D64.9 Anemia, 
unspecifiedNew Labs:CBC With Auto Diff, Scheduled: 20D50.0 Iron 
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 
chronic blood loss, iron is still low but better after iron transfusions.   
continue monitoring of cbc and iron, call for black/bloody stool  notes and 
labsto DR Navarrete she is not taking iron advised the pantoprazole is likely 
interfering with absorption ofiron from food and supplements.E66.01 Morbid (
severe) obesity due to excess caloriesComments:Congrats on weight loss!  Pt's 
new approach is helping, continue! she is down another 3#  BMI improved by over 
2 lrhxvrU85.2 Presence of prosthetic heart valveComments:sp mitral valve 
replacementpt brings her antibiotic for injection here just prior to dental 
care.I50.32 Chronic diastolic (congestive) heart failureComments:congestive 
heart failure, diastolic--Appears compensated without exacerbation.  Please 
call if you develop increased Shortness of breath at rest/exertion/nighttime,  
chest pain, swelling, edema, or increase of weight of 5# in a week.  You should 
be evaluated. call for any concerns.         No heart failure spells on 
spironolactone and furosmeide, with worsening kidney function. Recommend 
cutting backto furosmeide 40mg daily to spare stress on ieoewksR10.10 
Atherosclerotic heart disease of native coronary artery without angina 
pectorisComments:CAD--Patient appears stable without symptoms. sp cabg and 
mitral valve repair.  Advised  to continuemeds to control risk factors, statin 
therapy, atorvastatin and BP control, beta blocker metoprolol and acei 
irbesartan. Discussed side effects. Continues on aspirin, 81mg enteric coated. 
.  Encouraged continued healthy diet modified in fat and cholesterol with 
regular daily exercise.  Call for symptoms of chest pressure, pain tightness, 
at rest or with exertion, or increasing SOB/developing exercise intolerance.  
Call for concerns.     care co managed with joshua BorjaSvoltomoiC01 Essential (primary) 
hypertensionComments:Htn--BPs appear stable and in good control, reminded goal 
of BP &lt; 150/90 given age and conditions.  Continue current meds, please call 
for medication side effects such as cough, rash or any concerns.   Encouraged 
diet modified in fat and no added salt. Limit alcohol to &lt; 1 per day.  
Encouraged regular exercise of 30 min most days per week.  Encouraged pt to 
continue to monitor BP and call if &gt; 140/90 on a regular basis.      Please 
call if you feel your blood pressure is under 110 systolic and/or causing you 
symptoms such as dizziness, lightheadedness, or other concerns.J45.40 Moderate 
persistent asthma, uncomplicatedComments:Asthma stable, but not contrlled. 
Continue Asthma meds as prescribed.  Call for increased use of albuterol more 
that 2 times per week, or any new concerns or symptoms.  recommend annual flu 
vaccine, should receive the injection, care with Dr Cortez65.23 Occlusion 
and stenosis of bilateral carotid arteriesComments:carotid artery stenosis/
plaque, care with Dr Roque .recent dopplers show significant stenosis, ct angio 
2017 shows possible critical stenosis, Dr Roque is monitoring and last 
dopplers showed  springbilat 50-79%Reviewed signs/symptoms stroke and 
cardiovascular disease.N18.3 Chronic kidney disease, stage 3 (moderate)Comments:
chronic renal disease.  tight control of bp, ongoing renal f/u   with Dr Navarrete, 
creatinine is much higher this visit at 2.4, gfr down to 20s, and bun high, 
will reduce furosemide to 40mg twice daily , sh ehas fu this week with Dr Navarrete
, and expect labs to be rechecked.  note and current labs to Dr NavarreteK51.80 
Other ulcerative colitis without complicationsComments:colitis. symptoms 
improved with current treatmentcall for worsening symptoms  care with dr Nolasco49.5 Sick sinus syndromeComments:pacemaker placed 2015, sick sinus 
syndrome. care with Dr Borja, Pt without new sxs or concerns.  Continue 
meds.  Call for complaints of  dizziness, shortness of breath, vertigo, swelling
, weight loss, or any concerns.Z95.0 Presence of cardiac egokgtywpQ60.30 Sleep 
apnea, unspecifiedComments:Pt to continue apap to control sleep apnea.  Call if 
has trouble with machine or other issues.  Callprn excessive day time 
sleepiness or concerns.  care wit Dr Shah87.891 Personal history of nicotine 
dependenceComments:Former smoker, not a candidate for lung cancer screening 
based on history  Seek care for persistent cough, unexpected weight loss/fatigue
, coughing up blood as these can be signs of lung urnpsvF36.5 Polyp of 
colonComments:Pt with h/o colon polyps, last colonoscopy 3/2018, next in 3 yr 
Next colonoscopy due  years per DR Machado Please call for blood in stool, changes 
in bowel habits, any concerns.    Colonoscopy can still miss polyps.E11.21 Type 
2 diabetes mellitus with diabetic nephropathyComments:care with DR Navarrete good 
bp controlavoid nsaidshydrate wellReferral:aGrfield Maldonado M.D., 
[id number] Type 2 diabetes mellitus with mild nonproliferative 
diabeticComments:betsey QuinonezaceReferral:Garfield Maldonado M.D., 
KfygoplnvqwdlH00.8 Other osteoporosis without current pathological 
fractureComments:ostepenia--follow up  regarding osteoporosis disease and 
medicationslast dexa 2015 showed areas at -1.6 she is off medications as of 
. declines further treatment needs adequate vit d and calcium for meds to 
work, discussed doses of calcium and vit d for this pt continue meds and 
monitor for side ftkpwwtF32.9 Allergic rhinitis, unspecifiedComments:cont care 
with specialist  Dr KnoxZ12.31 Encounter for screening mammogram for 
malignant neoplasm of breastNew Xrays:Mammogram Screening, Bilateral Incl CAD 
When Performe, Scheduled: 20Comments:Annual mammogram and clinical breast 
exam  with monthly breast self exams discussed. Pt should call/come in if she 
has any changes in breast self exam or concerns.  Advised pt that for those 
aged 50-74, USPSTF recommends mammo every other year , and breast self exam or 
clinical breast exam need to be decided individually.  [employer] 
recommends for those age 55 and up, mammo every other year and to not do annual 
clinical breast exams. At risk pts should do annually or as directed by 
specialist recommendations. Currently I am recommending pts do what they feel 
they should, and that mostin this risk category do an annual mammogram and 
clinical breast exam.  If you want to treat breast cancer, then do annual 
screening.    Discussed the new recommendations for breast ultrasound for 
verydense breasts as an additional test for screening.    She asks for every 
other year mammogram and annual clinical breast exam.Z68.41 Body mass index (BMI
) 40.0-44.9, adultNew Labs:Manual Differential, Scheduled: 20Iron Panel, 
Scheduled: 20Comments:continue to work on diet, exercise, weight loss



Functional Status







 Description

 

 No Information Available







Mental Status







 Description

 

 No Information Available







Referrals







 Refer to   Reason for Referral  Status  Appt Date

 

 Maldonado,Garfield B, M.D.  annual diabetes eye exam due 2020  Created  









 [address], [place] [id number] [phone]

## 2020-02-27 NOTE — XMS REPORT
Continuity of Care Document

 Created on:February 3, 2020



Patient:CARLTON VIGIL

Sex:Female

:1950

External Reference #:[id number]





Demographics







 Address  [address] [place] [address]

 

 Home Phone  [phone]

 

 Mobile Phone  [phone]

 

 Preferred Language  en

 

 Marital Status  Not  or 

 

 Shinto Affiliation  Unknown

 

 Race  White

 

 Ethnic Group  Not  or 









Author







 Name  Manager, System

 

 Address  Unavailable



   Unavailable



   ,









Support







 Name  Relationship  Address  Phone

 

 CARLTON VIGIL  Unavailable  [address] [address]  [email or web address]



     [place] [address]  

 

 Scotty VIGIL  Unavailable  [address] [address]  [phone]



     [place] [address]  









Care Team Providers







 Name  Role  Phone

 

 Collette LOPEZ, Nicole  Unavailable  Unavailable

 

 Bryan Layne DO  Unavailable  [phone]

 

 Sandy LOPEZ, Mykel  Unavailable  [phone]

 

 Landon LOPEZ, Pilo  Unavailable  [phone]

 

 Inez Ponce DO  Unavailable  [phone]

 

 Quinton Borja MD  Unavailable  [phone]

 

 Prachi LOPEZ, Nayana HUNT  Unavailable  [phone]

 

 Kirti MSN, NPC, Siria  Unavailable  [phone]

 

 More LRT, Tank  Unavailable  Unavailable

 

 Gladys LPN, Phyllis  Unavailable  Unavailable

 

 Jannette RRT, Toney  Unavailable  Unavailable

 

 Sandrita LPN, Selam  Unavailable  Unavailable

 

 NP Preload, NP  Unavailable  Unavailable

 

 David LPN, Nisha  Unavailable  Unavailable

 

 Monaco LPN, Chacha  Unavailable  Unavailable

 

 Unavailable  Unavailable  [phone]









Problems







 ALLERGIC RHINITIS (J30.9) (477.9)  Kirti, MSN, NPC Siria  

 

 ASTHMA (Renamed from AIRWAY HYPERREACTIVITY) (J45.909) (493.90)  Kirti, MSN, 
NPC Siria  



 Prognosis: patient presents in follow up. hx ACOS and sleep apnea. has been 
stable on current therapy. still taking additional ICS, qvar at noon, with 
samples. spirometry is without obstruction and Feno is normal.    



  denies any recent symptoms or exacerbations. compliant with cpap nightly and 
reports benefit. our impressions are unchanged. she will attempt to hold the 
qvar and restart if needed. continue cpap night    



 ly. encouraged weight loss efforts. RTO 6 months and prn. plan of care 
approved by Dr. Bryan Layne as of 9-Sep-2019    

 

 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  



 Prognosis: patient presents for routine visit with diagnoses as noted. reports 
increased allergy symptoms lately leading to exacerbation. has been on oral 
steroids x2 with improvement. didn't tolerate step down th    



 erapy after 2019 when she left with samples of lower doses for ICS and 
LAMA. remains on cpap nightly with benefit. has lost additional weight with 
diet change, wants to lose more. reflux has been    



 fine. spirometry has reduced FVC and small airway disease. feno is normal. our 
impressions are noted. continue current therapy including cpap. avoid triggers. 
weight loss encouraged. she will call with    



 problems. RTO 6 months with tests and prn. plan of care approved by Dr. Bryan Layne as of 9-Sep-2019    

 

 CHRONIC KIDNEY DISEASE (N18.9) (585.9)  RAVIN Cotto NPC Jillian  

 

 CORONARY HEART DISEASE (I25.10) (414.9)  RAVIN Cotto NPC Jillian  

 

 DM (DIABETES MELLITUS), TYPE 2 (E11.9) (250.00)  RAVIN Cotto NPC Jillian  

 

 GASTROESOPHAGEAL REFLUX DISEASE (K21.9) (530.81)  RAVIN Cotto NPC Jillian  

 

 HEART MURMUR (R01.1) (785.2)  RAVIN Cotto NPC Jillian  

 

 HYPERLIPIDEMIA (Renamed from HLD (HYPERLIPIDEMIA)) (E78.5) (272.4)  RAVIN Cotto NPC Jillian  

 

 HYPERTENSION (I10) (401.9)  RAVIN Cotto NPC Jillian  

 

 OBESITY (Renamed from OBESE) (E66.9) (278.00)  RAVIN Cotto NPC Jillian  

 

 OBSTRUCTIVE SLEEP APNEA (G47.33) (327.23)  RAVIN Cotto NPC Jillian  Comments
: severe AHI



     49/hr, LS 73%

 

 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



   RAVIN Cotto NPC Jillian  s/p TAP







Allergies and Adverse Reactions







 Feldene *ANALGESICS - ANTI-INFLAMMATORY* (Allergy)    Comments: BLISTERS

 

 Latex (Allergy)    

 

 Penicillins (Allergy)    Reaction: Rash







Medications







 Albuterol Sulfate (2.5 MG/3ML) 0.083% Inhalation Nebulization Solution;  1 (one
) Nebulized Soln four times daily, as needed for 30 days  Ordered: 17-Oct-2017  
Start: 17-Oct-2017



 Quantity: 2 {Box}  RAVIN Cotto, GLENN Beverly  Comments: Medication taken as 
needed.



 Refills: 2    

 

 ALLOPURINOL, 100MG (Oral    



 Tablet);  2 daily (100 MG)    

 

 Astelin 137 MCG/SPRAY Nasal    



 Solution;  1 each nare two    



 times daily (137 MCG/SPRAY)    

 

 ATORVASTATIN CALCIUM, 10MG    



 (Oral Tablet);  1 daily (10 MG)    

 

 AVAPRO, 75MG (Oral Tablet);  1    



 daily (75 MG)    

 

 Dulera 200-5 MCG/ACT Inhalation Aerosol;  2 Puff two times daily for 30 days  
Ordered: 9-Sep-2019  Start: 9-Sep-2019



 Quantity: 1 {Inhaler}  RAVIN Cotto NPC Jillian  



 Refills: 11    

 

 FLONASE, 50MCG/ACT (Nasal Suspension);  1 (one) Spray two times daily, each 
nostril for 30 days  Ordered: 2016  Start: 2016



 Quantity: 1 {Spray}  RAVIN Cotto NPC Jillian  



 Refills: 11    

 

 KLOR-CON 10, 10MEQ (Oral Tablet    



 Extended Release);  1 daily (10    



 MEQ)    

 

 Lasix 80 MG Oral Tablet;  1 two    



 times daily (80 MG)    

 

 Levothyroxine Sodium 125 MCG    



 Oral Capsule;  1 daily (125    



 MCG)    

 

 PANTOPRAZOLE SODIUM, 40MG (Oral    



 Tablet Delayed Release);  1 two    



 times daily (40 MG)    

 

 Singulair 10 MG Oral Tablet;  1 Tablet daily for 90 days  Ordered: 2019
  Start: 2019



 Quantity: 90 {Tablet}  RAVIN Cotto NPC Jillian  



 Refills: 3    

 

 SPIRIVA RESPIMAT, 2.5mcg (Inhalation Aerosol Breath Activated) (Free Text);  2 
Puff daily for 30 days  Ordered: 1-Mar-2019  Start: 1-Mar-2019



 Quantity: 1 {Inhaler}  RAVIN Cotto NPC Jillian  



 Refills: 11    

 

 Spironolactone 25 MG Oral    



 Tablet;  1 daily (25 MG)    

 

 Ventolin  (90 Base) MCG/ACT Inhalation Aerosol Solution;  2 (two) Puff 
four times daily as needed for 30 days  Ordered: 2018  Start: 2018



 Quantity: 1 {Inhaler}  RAVIN Cotto NPC Jillian  Comments: Medication taken 
as needed.



 Refills: 5    

 

 Xolair 150 MG Subcutaneous    Comments: asthma & allergy



 Solution Reconstituted;  every    assoc



 two weeks (150 MG)    

 

 Xyzal 5 MG Oral Tablet;  1 (one) Tablet daily for 30 days  Ordered: 2017  Start: 2017



 Quantity: 30 {Tablet}  RAVIN Cotto NPC Jillian  



 Refills: 11    

 

 ALENDRONATE SODIUM, 70MG (Oral     End: 27-Mar-2012



 Tablet);  weekly (70 MG)    Status: Inactive

 

 ALVESCO, 160MCG/ACT (Inhalation Aerosol Solution);  2 (two) Puff(s) Puff(s) 
two times daily for 30 days  Ordered: 6-Oct-2014  Start: 30-May-2014 End: 6-Oct-
2014



 Quantity: 1 {Inhaler}  RAVIN Cotto NPC Jillian  Status: Inactive



 Refills: 3    

 

 AMLODIPINE BESYLATE, 5MG (Oral    Status: Inactive



 Tablet);  1 daily (5 MG)    

 

 Astepro 0.15 % Nasal Solution;  1 Solution daily for 30 days  Ordered: 29-Sep-
2017  Start: 2016 End: 29-Sep-2017



 Quantity: 1 {Spray}  RAVIN Cotto NPC Jillian  Status: Inactive



 Refills: 11    Dispense as Written

 

 CARVEDILOL, 6.25MG (Oral    Status: Inactive



 Tablet);  Daily (6.25 MG)    

 

 CPAP ( Device) (Free Text);  at     End: 27-Mar-2012



 bedtime    Status: Inactive



     Comments: +17

 

 CPAP PRESSURE CHANGE (327.23) ( Device) (Free Text);  1 Device change pressure 
to +14 cm for 0 days  Ordered: 1-Oct-2013  Start: 27-Mar-2012 End: 1-Oct-2013



 Quantity: 1 {Device}  RAVIN Cotto NPC Jillian  Status: Inactive



 Refills: 0    

 

 CPAP SUPPLIES AND MASK (327.23) ( Device) (Free Text);  1 Device as needed for 
365 days  Ordered: 27-Sep-2012  Start: 27-Sep-2012 End: 27-Sep-2013



 Refills: 0  RAVIN Cotto, NPC Siria  Status: Inactive



     Comments: Medication taken as needed.

 

 CPAP UNIT (327.23) ( Device)    Status: Inactive



 (Free Text);  auto-titating    Comments: jody



 unit at bedtime    

 

 Gabapentin 100 MG Oral Capsule;    Status: Inactive



  1 two times daily (100 MG)    

 

 LEVOCETIRIZINE DIHYDROCHLORIDE,    Status: Inactive



 5MG (Oral Tablet);  1 daily (5    



 MG)    

 

 METFORMIN HCL, 850MG (Oral    Status: Inactive



 Tablet);  two times daily (850    



 MG)    

 

 NASONEX, 50MCG/ACT (Nasal    Status: Inactive



 Suspension);  daily (50    



 MCG/ACT)    

 

 NEXIUM, 40MG (Oral Capsule    Status: Inactive



 Delayed Release);  daily (40    



 MG)    

 

 PATADAY, 0.2% (Ophthalmic    Status: Inactive



 Solution);  UAD (0.2 %)    

 

 PATANOL, 0.1% (Ophthalmic     End: 27-Mar-2012



 Solution);  as needed (0.1 %)    Status: Inactive



     Comments: Medication taken as needed.

 

 PLAVIX, 75MG (Oral Tablet);    Status: Inactive



 Daily (75 MG)    

 

 Qvar 80 MCG/ACT Inhalation     End: 2018



 Aerosol Solution;  2 daily (80    Status: Inactive



 MCG/ACT)    

 

 VITAMIN D (ERGOCALCIFEROL),     End: 27-Mar-2012



 68203IYGU (Oral Capsule);    Status: Inactive



 weekly (45676 UNIT)    

 

 ZETIA, 10MG (Oral Tablet);    Status: Inactive



 daily (10 MG)    

 

 ALENDRONATE SODIUM, 35MG (Oral     End: 1-Oct-2013



 Tablet);  weekly (35 MG)    Status: Discontinued

 

 ALVESCO, 160MCG/ACT (Inhalation     End: 27-Mar-2012



 Aerosol Solution);  2 puffs two    Status: Discontinued



 times daily (160 MCG/ACT)    

 

 HYDROCHLOROTHIAZIDE, 12.5MG     End: 2014



 (Oral Capsule);  2 daily (12.5    Status: Discontinued



 MG)    

 

 NASONEX, 50MCG/ACT (Nasal     End: 30-May-2014



 Suspension);  2 sprays each    Status: Discontinued



 nostril daily (50 MCG/ACT)    

 

 OMEPRAZOLE, 20MG (Oral Capsule     End: 27-Mar-2012



 Delayed Release);  daily (20    Status: Discontinued



 MG)    

 

 PROVENTIL HFA, 108 (90 Base)MCG/ACT (Inhalation Aerosol Solution);  2 (two) 
Puff(s) four times daily, as needed for 0 days  Ordered: 2014  Start: 1-
Oct-2013 End: 2014



 Quantity: 1 {Inhaler(s)}  ITALIA Remy  Status: Discontinued



 Refills: 1    Comments: Medication taken as needed.

 

 SEREVENT DISKUS, 50MCG/DOSE     End: 27-Mar-2012



 (Inhalation Aerosol Powder    Status: Discontinued



 Breath Activated);  two times    



 daily (50 MCG/DOSE)    







Procedures







 EXHALED GAS NITRIC OXIDE MEASUREMENT    Status: Completed 9-Sep-2019



 (MOLES/VOLUME) (85991)    

 

 EXHALED GAS NITRIC OXIDE MEASUREMENT    Status: Completed 1-Mar-2019



 (MOLES/VOLUME) (10192)    

 

 RESPIRATORY FLOW VOLUME LOOP (43921)    Status: Completed 2018

 

 AIRFLOW RESISTANCE MEASUREMENT: PULM FUNCT    Status: Completed 2018



 TEST OSCILLOMETRY (83075)    

 

 TOTAL VITAL CAPACITY (44667)    Status: Completed 2018

 

 TOTAL BODY PLETHYSMOGRAPHY: AIRWAY CLOSING    Status: Completed 2018



 VOLUME MEASUREMENT: PULM FUNCT TST    



 PLETHYSMOGRAP (69287)    

 

 RESPIRATORY FLOW VOLUME LOOP (27993)    Status: Completed 2018

 

 THORACIC GAS VOLUME: AIRWAY CLOSING VOLUME    Status: Completed 2018



 MEASUREMENT: PULM FUNCTION TEST BY GAS    



 (80609)    

 

 DLCO (CARBON MONOXIDE DIFFUSING CAPACITY)    Status: Completed 2018



 (65207)    

 

 PRE AND POST (87875)    Status: Completed 2018

 

 REST/ EXERCISE OXIMETRY (32077)    Status: Completed 15-Nov-2017

 

 RESPIRATORY FLOW VOLUME LOOP (89288)    Status: Completed 15-Nov-2017

 

 TOTAL VITAL CAPACITY (56436)    Status: Completed 29-Sep-2017

 

 TOTAL BODY PLETHYSMOGRAPHY (49522)    Status: Completed 29-Sep-2017

 

 TGV THORACIC GAS VOLUME: AIRWAY CLOSING    Status: Completed 29-Sep-2017



 VOLUME MEASUREMENT: PULM FUNCTION TEST BY    



 GAS (08476)    

 

 RESPIRATORY FLOW VOLUME LOOP (47082)    Status: Completed 29-Sep-2017

 

 DLCO (CARBON MONOXIDE DIFFUSING CAPACITY)    Status: Completed 29-Sep-2017



 (88364)    

 

 AIRFLOW RESISTANCE MEASUREMENT: PULM FUNCT    Status: Completed 29-Sep-2017



 TEST OSCILLOMETRY (19563)    

 

 PRE AND POST (81437)    Status: Completed 29-Sep-2017

 

 RESPIRATORY FLOW VOLUME LOOP (61039)    Status: Completed 14-Dec-2016

 

 PRE AND POST (16060)    Status: Completed 14-Dec-2016

 

 AIRFLOW RESISTANCE MEASUREMENT: PULM FUNCT    Status: Completed 2016



 TEST OSCILLOMETRY (91529)    

 

 TOTAL VITAL CAPACITY (30567)    Status: Completed 2016

 

 TOTAL BODY PLETHYSMOGRAPHY: AIRWAY CLOSING    Status: Completed 2016



 VOLUME MEASUREMENT: PULM FUNCT TST    



 PLETHYSMOGRAP (20469)    

 

 RESPIRATORY FLOW VOLUME LOOP (18729)    Status: Completed 2016

 

 THORACIC GAS VOLUME: AIRWAY CLOSING VOLUME    Status: Completed 2016



 MEASUREMENT: PULM FUNCTION TEST BY GAS    



 (88826)    

 

 DLCO (CARBON MONOXIDE DIFFUSING CAPACITY)    Status: Completed 2016



 (89518)    

 

 PRE AND POST (83787)    Status: Completed 2016

 

 SIMPLE PFT: BASELINE PULMONARY FUNCTION    Status: Completed 2015



 TEST (PFT) (14875)    

 

 RESPIRATORY FLOW VOLUME LOOP (35897)    Status: Completed 2015

 

 RESPIRATORY FLOW VOLUME LOOP (53771)    Status: Completed 2015

 

 PRE AND POST W/ RT (94533)    Status: Completed 2015

 

 RESPIRATORY FLOW VOLUME LOOP (13508)    Status: Completed 6-Oct-2014

 

 PRE AND POST (96045)    Status: Completed 6-Oct-2014

 

 REST/EXERCISE OXIMETRY (20972)    Status: Completed 30-May-2014

 

 ACT: ASSESSMENT OF DISEASE: ASTHMA    Status: Cancelled 30-May-2014



 SYMPTOMS EVALUATE (1005F)    

 

 RESPIRATORY FLOW VOLUME LOOP (52379)    Status: Completed 30-May-2014

 

 PRE AND POST (34104)    Status: Completed 30-May-2014

 

 PRE AND POST (01466)    Status: Completed 27-Sep-2012

 

 EXERCISE OXIMETRY (41139)    Status: Completed 27-Sep-2012

 

 RESPIRATORY FLOW VOLUME LOOP (68451)    Status: Completed 27-Sep-2012

 

 Adenoidectomy    Status: Completed 

 

 Appendectomy    Status: Completed 

 

 Cardiac Pacemaker Insertion    Status: Completed Dec-2015

 

 Chest X-ray    Status: Completed 29-Sep-2017



     Comments: no obvious acute disease, official interpretation and comparison 
from the radiologist is pending.

 

 Chest X-ray    Status: Completed 2016



     Comments: pacemaker present. no obvious acute disease, official 
interpretation and comparison from the radiologist is pending.

 

 Chest X-ray    Status: Completed 15-Nov-2017



     Comments: hyperinflation, possible interstitial edema

 

 Cholecystectomy    Status: Completed 

 

 Coronary Artery Bypass, Three    Status: Completed 28-Sep-2015

 

 CPAP    Status: Completed 2010



     Comments: +17 cm

 

 CT Scan of Chest    Status: Completed 2018



     Comments: Abnormal. Emphysematous changes.

 

 FeNO    Status: Completed 9-Sep-2019



     Comments: 15 ppb

 

 FeNO    Status: Completed 1-Mar-2019



     Comments: 8 PPB

 

 FeNO    Status: Completed 2018



     Comments: Normal. 5

 

 FeNO    Status: Completed 2018



     Comments: Normal. 14

 

 Flu Vaccine    Status: Completed 1-Sep-2018



     Comments: date approx

 

 Heart Valve Surgery    Status: Completed 28-Sep-2015



     Comments: repair mitral valve with CABG

 

 Left carotid coiling    Status: Completed 



     Comments: aneurysm

 

 Lithotripsy    Status: Completed 



     Comments: Left.

 

 PFT    Status: Completed 1-Mar-2019



     Comments: reduced FVC 66%. FEV1 74%. ratio 85

 

 PFT    Status: Completed 14-Dec-2016



     Comments: Abnormal. Minimal Obstruction. Vital capacity 70%.

 

 PFT    Status: Completed 27-Mar-2012



     Comments: small airway obstruction, FEV1 1.97 L, 83%. improved from 2011

 

 PFT    Status: Completed 9-Sep-2019



     Comments: reduced FVC with small airway disease

 

 PFT    Status: Completed 2018



     Comments: reduced FVC, no obstruction

 

 PFT    Status: Completed 29-Sep-2017



     Comments: No Obstruction. No Restriction. Moderate Diffusion Defect. 
reduced FVC 67% without obstruction. ratio 79. normal TLC. moderate DLCO defect

 

 PFT    Status: Completed 15-Nov-2017



     Comments: reduced FVC 67%, ratio 80

 

 PFT    Status: Completed 2018



     Comments: Abnormal. Hyperreactive small airways with normal lung volumes 
and moderate diffusion defect.

 

 PFT    Status: Completed 27-Sep-2011



     Comments: Mild Obstruction. improved from 2011

 

 PFT    Status: Completed 27-Sep-2012



     Comments: Mild Obstruction. FEV1 1.82 L, 71%

 

 PFT    Status: Completed 27-Mar-2013



     Comments: Minimal Obstruction. FEV1 2.04 L, 87%. improved from 2012

 

 PFT    Status: Completed 1-Oct-2013



     Comments: Mild Obstruction. FEV1 1.95 L, 84%. stable

 

 PFT    Status: Completed 30-May-2014



     Comments: Moderate Obstruction. FEV1 1.67 L, 66%. declined from prior. ACT 
18

 

 PFT    Status: Completed 2014



     Comments: Mild Obstruction. FEV1 1.84 L, 79%. improved from may 2014

 

 PFT    Status: Completed 6-Oct-2014



     Comments: Mild Obstruction. FEV1 1.77 L, 70%

 

 PFT    Status: Completed 2015



     Comments: Mild Obstruction. FEV1 1.79 L, 72%. ACT 25

 

 PFT    Status: Completed 2016



     Comments: Minimal Obstruction. No Restriction. Mild Diffusion Defect. FEV1 
1.93, 78%. TLC 94, DLCO 73

 

 PFT    Status: Completed 2015



     Comments: Mild Obstruction. FEV1 1.83 L, 73%. ACT 8

 

 Polysomnography    Status: Completed 27-Oct-2010



     Comments: severe AHI 49/hr, LS 73%

 

 Stent placement    Status: Completed



     Comments: coronary artery X2

 

 Tonsillectomy    Status: Completed 









 PRE AND POST (24388)Result: Hemoptysis: No   Status: Completed 9-Sep-2019

 

 PRE AND POST (97725)Result: Hemoptysis: No   Status: Completed 1-Mar-2019

 

 PRE AND POST (30600)Result: Hemoptysis: No   Status: Completed 2018

 

 CT THORAX W/O DYE (56705)Result: Are you pregnant or   Status: Completed 2018



 could you become pregnant?: No; When was you last  



 CXR/CT?: over week ago; Radiology Technician: JOHANNE Barron  

 

 PRE AND POST (66291)Result: Hemoptysis: No   Status: Completed 15-Nov-2017

 

 CHEST X-RAY, PA AND LATERAL (02359)Result: Are you   Status: Completed 29-Sep-
2017



 pregnant or could you become pregnant?: No; When was  



 you last CXR/CT?: over week ago; Radiology Technician:  



 JOHANNE Barron  

 

 CHEST X-RAY, PA AND LATERAL (81521)Result: Are you   Status: Completed 2016



 pregnant or could you become pregnant?: No; When was  



 you last CXR/CT?: OVER WEEK AGO; Radiology Technician:  



 JOHANNE Barron  

 

 ACT: ASSESSMENT OF DISEASE: ASTHMA SYMPTOMS EVALUATE   Status: Completed 2015



 (1005F)Result: [Questions] In the past 4 weeks, how  



 much of the time did your asthma keep you from getting  



 as much done at work, school, or home?: 5; During the  



 past 4 weeks, how often have you had shortness of  



 breath?: 5; During the past 4 weeks, how often did  



 your asthma symptoms (wheezing, coughing, shortness of  



 breath, chest tightness, or pain) wake you up at night  



 or earlier than usual in the morning?: 5; During the  



 past 4 weeks, how often have you used your rescue  



 inhaler or nebulizer medication (such as albuterol)?:  



 5; How would you rate your asthma control during the  



 past 4 weeks?: 5 [Total ACT Score] Score: 25  

 

 ACT: ASSESSMENT OF DISEASE: ASTHMA SYMPTOMS EVALUATE   Status: Completed 2015



 (1005F)Result: [Questions] In the past 4 weeks, how  



 much of the time did your asthma keep you from getting  



 as much done at work, school, or home?: 2; During the  



 past 4 weeks, how often have you had shortness of  



 breath?: 1; During the past 4 weeks, how often did  



 your asthma symptoms (wheezing, coughing, shortness of  



 breath, chest tightness, or pain) wake you up at night  



 or earlier than usual in the morning?: 1; During the  



 past 4 weeks, how often have you used your rescue  



 inhaler or nebulizer medication (such as albuterol)?:  



 1; How would you rate your asthma control during the  



 past 4 weeks?: 3 [Total ACT Score] Score: 8  

 

 ACT: ASSESSMENT OF DISEASE: ASTHMA SYMPTOMS EVALUATE   Date: 6-Oct-2014



 (1005F)Result: [Questions] In the past 4 weeks, how  



 much of the time did your asthma keep you from getting  



 as much done at work, school, or home?: 5; During the  



 past 4 weeks, how often have you had shortness of  



 breath?: 4; During the past 4 weeks, how often did  



 your asthma symptoms (wheezing, coughing, shortness of  



 breath, chest tightness, or pain) wake you up at night  



 or earlier than usual in the morning?: 5; During the  



 past 4 weeks, how often have you used your rescue  



 inhaler or nebulizer medication (such as albuterol)?:  



 4; How would you rate your asthma control during the  



 past 4 weeks?: 4  

 

 ACT: ASSESSMENT OF DISEASE: ASTHMA SYMPTOMS EVALUATE   Status: Completed 2014



 (1005F)Result: [Questions] In the past 4 weeks, how  



 much of the time did your asthma keep you from getting  



 as much done at work, school, or home?: 5; During the  



 past 4 weeks, how often have you had shortness of  



 breath?: 4; During the past 4 weeks, how often did  



 your asthma symptoms (wheezing, coughing, shortness of  



 breath, chest tightness, or pain) wake you up at night  



 or earlier than usual in the morning?: 5; During the  



 past 4 weeks, how often have you used your rescue  



 inhaler or nebulizer medication (such as albuterol)?:  



 3; How would you rate your asthma control during the  



 past 4 weeks?: 4 [Total ACT Score] Score: 17  

 

 ACT: ASSESSMENT OF DISEASE: ASTHMA SYMPTOMS EVALUATE   Status: Completed 30-May
-2014



 (1005F)Result: [Questions] In the past 4 weeks, how  



 much of the time did your asthma keep you from getting  



 as much done at work, school, or home?: 4; During the  



 past 4 weeks, how often have you had shortness of  



 breath?: 3; During the past 4 weeks, how often did  



 your asthma symptoms (wheezing, coughing, shortness of  



 breath, chest tightness, or pain) wake you up at night  



 or earlier than usual in the morning?: 5; During the  



 past 4 weeks, how often have you used your rescue  



 inhaler or nebulizer medication (such as albuterol)?:  



 3; How would you rate your asthma control during the  



 past 4 weeks?: 3 [Total ACT Score] Score: 18  

 

 ACT: ASSESSMENT OF DISEASE: ASTHMA SYMPTOMS EVALUATE   Date: 27-Mar-2013



 (1005F)Result: [Questions] In the past 4 weeks, how  



 much of the time did your asthma keep you from getting  



 as much done at work, school, or home?: 5; During the  



 past 4 weeks, how often have you had shortness of  



 breath?: 5; During the past 4 weeks, how often did  



 your asthma symptoms (wheezing, coughing, shortness of  



 breath, chest tightness, or pain) wake you up at night  



 or earlier than usual in the morning?: 5; During the  



 past 4 weeks, how often have you used your rescue  



 inhaler or nebulizer medication (such as albuterol)?:  



 5; How would you rate your asthma control during the  



 past 4 weeks?: 5 [Total ACT Score] Score: 25  

 

 RESPIRATORY FLOW VOLUME LOOP (16181)Result:   Date: 27-Sep-2011



 Hemoptysis: No; Medication Used: Albuterol 0.083% sol  



 aerosol; Respiratory Technician: Toney Bhatia RRT  







Immunizations







 Influenza (3 years and up)  On: 2011  

 

 Influenza (3 years and up)  On: 2012  

 

 Influenza (3 years and up)  On: 2013  

 

 Influenza (3 years and up)  On: 22-Sep-2014  

 

 Influenza (3 years and up)  On: 1-Oct-2015  Comments:date approximate

 

 Influenza (3 years and up)  On: Sep-2016  

 

 Influenza (3 years and up)  On: 27-Sep-2017  Comments:pharmacy







Family History







 COPD  Status: Active  Comments: Sister.

 

 Sleep Apnea  Status: Active  Comments: Mother. Father.







Social History







 Alcohol use: Occasional alcohol use.    

 

 Caffeine use    Comments: rarely

 

 Marital status: .    

 

 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 




 including clerical.    

 

 Tobacco use: Former smoker.    Comments: max 3 ppd; 13-38









 Former smoker    









 Female







Plan of Treatment







 AIRFLOW RESISTANCE MEASUREMENT:  Start: 9-Mar-2020  Intent



 PULM FUNCT TEST OSCILLOMETRY    



 (20512)    

 

 CXR PA & LAT (90068)  Start: 9-Mar-2020  Intent

 

 DLCO (CARBON MONOXIDE DIFFUSING  Start: 9-Mar-2020  Intent



 CAPACITY) (60937)    

 

 EXHALED GAS NITRIC OXIDE  Start: 9-Mar-2020  Intent



 MEASUREMENT (MOLES/VOLUME) (61752)    

 

 PRE AND POST (02034)  Start: 9-Mar-2020  Intent

 

 THORACIC GAS VOLUME: AIRWAY CLOSING  Start: 9-Mar-2020  Intent



 VOLUME MEASUREMENT: PULM FUNCTION    



 TEST BY GAS (60964)    

 

 TOTAL VITAL CAPACITY (66512)  Start: 9-Mar-2020  Intent

 

 EXHALED NITRIC OXIDE MEASUREMENT  Start: 2018  Intent



 (10235)    Comments: Evaluated patient's nitric oxide. The results were 14  ppb

 

 REST OXIMETRY (99561)  Start: 2018  Intent

 

 EXHALED NITRIC OXIDE MEASUREMENT  Start: 2018  Intent



 (49066)    Comments: next visit

 

 EXHALED NITRIC OXIDE MEASUREMENT  Start: 2018  Intent



 (44061)    Comments: Evaluated patient's nitric oxide. The results were 5 ppb.

 

 REST OXIMETRY (91607)  Start: 14-Dec-2016  Intent

 

 ACT: ASSESSMENT OF DISEASE: ASTHMA  Start: 14-Dec-2016  Intent



 SYMPTOMS EVALUATE (1005F)    

 

 RESPIRATORY FLOW VOLUME LOOP  Start: 2014  Intent



 (52856)    

 

 PRE AND POST (87839)  Start: 2014  Intent

 

 RESPIRATORY FLOW VOLUME LOOP  Start: 1-Oct-2013  Intent



 (02074)    

 

 PRE AND POST (84053)  Start: 1-Oct-2013  Intent

 

 RESPIRATORY FLOW VOLUME LOOP  Start: 27-Mar-2013  Intent



 (98841)    

 

 REST OXIMETRY (46590)  Start: 27-Mar-2013  Intent

 

 PRE AND POST (15971)  Start: 27-Mar-2013  Intent

 

 REST/EXERCISE OXIMETRY (00350)  Start: 27-Mar-2012  Intent

 

 RESPIRATORY FLOW VOLUME LOOP  Start: 27-Mar-2012  Intent



 (50685)    

 

 Pre/Post (95799)  Start: 27-Mar-2012  Intent

 

 Pre/Post (80535)  Start: 27-Sep-2011  Intent









 ASTHMA-COPD OVERLAP SYNDROME : FU EITHER 15/30- Dr. Bryan Layne patient    



 Indication:ASTHMA-COPD OVERLAP SYNDROME    

 

 OBESITY (Renamed from OBESE) : Weight loss encouraged    



 Indication:OBESITY (Renamed from OBESE)    

 

 GASTROESOPHAGEAL REFLUX DISEASE : Avoid triggers    



 Indication:GASTROESOPHAGEAL REFLUX DISEASE    

 

 ASTHMA-COPD OVERLAP SYNDROME : FU EITHER 15/30- Dr. Bryan Layne patient    



 Indication:ASTHMA-COPD OVERLAP SYNDROME    

 

 OBESITY (Renamed from OBESE) : Weight loss encouraged    



 Indication:OBESITY (Renamed from OBESE)    

 

 GASTROESOPHAGEAL REFLUX DISEASE : Avoid triggers    



 Indication:GASTROESOPHAGEAL REFLUX DISEASE    

 

 ASTHMA (Renamed from AIRWAY HYPERREACTIVITY) : FU EITHER 15/30- Dr. Bryan Layne patient    



 Indication:ASTHMA (Renamed from AIRWAY HYPERREACTIVITY)    

 

 OBESITY (Renamed from OBESE) : Weight loss encouraged    



 Indication:OBESITY (Renamed from OBESE)    

 

 ASTHMA (Renamed from AIRWAY HYPERREACTIVITY) : FU NP 30- Dr. Bryan Layne 
patient    



 Indication:ASTHMA (Renamed from AIRWAY HYPERREACTIVITY)    

 

 ASTHMA (Renamed from AIRWAY HYPERREACTIVITY) : Change Qvar dosing- allergist 
wanted 2 puffs mid day    



 Indication:ASTHMA (Renamed from AIRWAY HYPERREACTIVITY)    

 

 GASTROESOPHAGEAL REFLUX DISEASE : Avoid triggers    



 Indication:GASTROESOPHAGEAL REFLUX DISEASE    

 

 OBESITY (Renamed from OBESE) : Weight loss may help    



 Indication:OBESITY (Renamed from OBESE)    

 

 ASTHMA (Renamed from AIRWAY HYPERREACTIVITY) : Follow up at [facility]    



 Indication:ASTHMA (Renamed from AIRWAY HYPERREACTIVITY)    

 

 ASTHMA (Renamed from AIRWAY HYPERREACTIVITY) : FU EITHER 15/30- Dr. Bryan Layne patient    



 Indication:ASTHMA (Renamed from AIRWAY HYPERREACTIVITY)    

 

 OBESITY (Renamed from OBESE) : Weight loss may help    



 Indication:OBESITY (Renamed from OBESE)    

 

 GASTROESOPHAGEAL REFLUX DISEASE : Avoid triggers    



 Indication:GASTROESOPHAGEAL REFLUX DISEASE    

 

 ASTHMA (Renamed from AIRWAY HYPERREACTIVITY) : JI MD 15- Dr. Bryan Layne    



 Indication:ASTHMA (Renamed from AIRWAY HYPERREACTIVITY)    

 

 OBSTRUCTIVE SLEEP APNEA : Use cpap nightly    



 Indication:OBSTRUCTIVE SLEEP APNEA    

 

 OBESITY (Renamed from OBESE) : Weight loss encouraged    



 Indication:OBESITY (Renamed from OBESE)    

 

 ASTHMA (Renamed from AIRWAY HYPERREACTIVITY) : Obtain CXR and CT chest reports 
from [facility]    



 Indication:ASTHMA (Renamed from AIRWAY HYPERREACTIVITY)    

 

 ASTHMA (Renamed from AIRWAY HYPERREACTIVITY) : FU EITHER 15/30- Dr. Bryan Layne patient    



 Indication:ASTHMA (Renamed from AIRWAY HYPERREACTIVITY)    

 

 GASTROESOPHAGEAL REFLUX DISEASE : Avoid triggers    



 Indication:GASTROESOPHAGEAL REFLUX DISEASE    

 

 ASTHMA (Renamed from AIRWAY HYPERREACTIVITY) : FU EITHER 15/30- Xi patient
    



 Indication:ASTHMA (Renamed from AIRWAY HYPERREACTIVITY)    

 

 ASTHMA (Renamed from AIRWAY HYPERREACTIVITY) : Obtain CT chest report from Dr. Ponce- Hem/onc office    



 Indication:ASTHMA (Renamed from AIRWAY HYPERREACTIVITY)    

 

 ASTHMA (Renamed from AIRWAY HYPERREACTIVITY) : Influenza vaccine seasonally - 
current    



 Indication:ASTHMA (Renamed from AIRWAY HYPERREACTIVITY)    

 

 ASTHMA (Renamed from AIRWAY HYPERREACTIVITY) : FU EITHER 15/30- Xi patient
    



 Indication:ASTHMA (Renamed from AIRWAY HYPERREACTIVITY)    

 

 ASTHMA (Renamed from AIRWAY HYPERREACTIVITY) : FU EITHER 15/30- Xi patient
    



 Indication:ASTHMA (Renamed from AIRWAY HYPERREACTIVITY)    

 

 ASTHMA (Renamed from AIRWAY HYPERREACTIVITY) : Follow up in 4 weeks    



 Indication:ASTHMA (Renamed from AIRWAY HYPERREACTIVITY)    

 

 ASTHMA (Renamed from AIRWAY HYPERREACTIVITY) : FU EITHER 15/30- Xi patient
    



 Indication:ASTHMA (Renamed from AIRWAY HYPERREACTIVITY)    

 

 ALLERGIC RHINITIS : Start sinus rinses    



 Indication:ALLERGIC RHINITIS    

 

 ASTHMA (Renamed from AIRWAY HYPERREACTIVITY) : FU EITHER 15/30- Xi patient
    



 Indication:ASTHMA (Renamed from AIRWAY HYPERREACTIVITY)    

 

 ASTHMA (Renamed from AIRWAY HYPERREACTIVITY) : Influenza vaccine seasonally - 
current    



 Indication:ASTHMA (Renamed from AIRWAY HYPERREACTIVITY)    

 

 ASTHMA (Renamed from AIRWAY HYPERREACTIVITY) : FU EITHER 15/30- Xi patient
    



 Indication:ASTHMA (Renamed from AIRWAY HYPERREACTIVITY)    

 

 ASTHMA (Renamed from AIRWAY HYPERREACTIVITY) : FU EITHER 15/30    



 Indication:ASTHMA (Renamed from AIRWAY HYPERREACTIVITY)    

 

 ASTHMA (Renamed from AIRWAY HYPERREACTIVITY) : Follow up in 6 months    



 Indication:ASTHMA (Renamed from AIRWAY HYPERREACTIVITY)    

 

 ASTHMA (Renamed from AIRWAY HYPERREACTIVITY) : Oximetry - Rest and exercise 
next visit    



 Indication:ASTHMA (Renamed from AIRWAY HYPERREACTIVITY)    

 

 GASTROESOPHAGEAL REFLUX DISEASE : Avoid triggers    



 Indication:GASTROESOPHAGEAL REFLUX DISEASE    

 

 GASTROESOPHAGEAL REFLUX DISEASE : Continue current medications    



 Indication:GASTROESOPHAGEAL REFLUX DISEASE    

 

 ALLERGIC RHINITIS : Envirnmental controls    



 Indication:ALLERGIC RHINITIS    

 

 ALLERGIC RHINITIS : Continue current medications    



 Indication:ALLERGIC RHINITIS    

 

 OBESITY (Renamed from OBESE) : Weight Loss    



 Indication:OBESITY (Renamed from OBESE)    

 

 ASTHMA (Renamed from AIRWAY HYPERREACTIVITY) : Call with symptoms    



 Indication:ASTHMA (Renamed from AIRWAY HYPERREACTIVITY)    

 

 ASTHMA (Renamed from AIRWAY HYPERREACTIVITY) : Influenza vaccine in     



 Indication:ASTHMA (Renamed from AIRWAY HYPERREACTIVITY)    

 

 ASTHMA (Renamed from AIRWAY HYPERREACTIVITY) : Continue current medications    



 Indication:ASTHMA (Renamed from AIRWAY HYPERREACTIVITY)    

 

 OBSTRUCTIVE SLEEP APNEA : Discussed possible health risks associated with 
untreated sleep apnea    



 Indication:OBSTRUCTIVE SLEEP APNEA    

 

 OBSTRUCTIVE SLEEP APNEA : Discussed possible cardiovascular effects of 
untreated sleep apnea    



 Indication:OBSTRUCTIVE SLEEP APNEA    

 

 OBSTRUCTIVE SLEEP APNEA : Discussed need to notify all physicians of their 
diagnosis    



 Indication:OBSTRUCTIVE SLEEP APNEA    

 

 OBSTRUCTIVE SLEEP APNEA : Discussed long term use    



 Indication:OBSTRUCTIVE SLEEP APNEA    

 

 OBSTRUCTIVE SLEEP APNEA : Discussed the importance of compliance    



 Indication:OBSTRUCTIVE SLEEP APNEA    

 

 OBSTRUCTIVE SLEEP APNEA : Cpap Supplies    



 Indication:OBSTRUCTIVE SLEEP APNEA    

 

 OBSTRUCTIVE SLEEP APNEA : Continue with Cpap therapy    



 Indication:OBSTRUCTIVE SLEEP APNEA    







Results







 No Known Results    

 

   No Result Information Available  







Vital Signs







 9-Sep-2019 11:15    

 

   Temperature 97.1 f  Comments: Method: Tympanic

 

   Pulse 86 /min  Comments: Pattern: Regular

 

   Respiration Rate 16 /min  Comments: Pattern: Unlabored

 

   O2 SAT 98 %  Comments: Room air

 

   BP Systolic 130 mm[Hg]  Comments: Patient Position: Sitting; Cuff



     Location: Left Arm; Cuff Size: Standard

 

   BP Diastolic 72 mm[Hg]  Comments: Patient Position: Sitting; Cuff



     Location: Left Arm; Cuff Size: Standard

 

   Weight 238 lb  

 

   Height 66 in  

 

   BMI 38.41 kg/m2  

 

   BSA 2.15 m2  

 

 1-Mar-2019 12:52    

 

   Temperature 97.5 f  Comments: Method: Tympanic

 

   Pulse 79 /min  Comments: Pattern: Regular

 

   Respiration Rate 14 /min  Comments: Pattern: Unlabored

 

   O2 SAT 98 %  Comments: Room air

 

   BP Systolic 140 mm[Hg]  Comments: Patient Position: Sitting; Cuff



     Location: Left Arm; Cuff Size: Standard

 

   BP Diastolic 74 mm[Hg]  Comments: Patient Position: Sitting; Cuff



     Location: Left Arm; Cuff Size: Standard

 

   Weight 250 lb  

 

   Height 66 in  

 

   BMI 40.35 kg/m2  

 

   BSA 2.2 m2  

 

 2018 11:34    

 

   Temperature 96.6 f  Comments: Method: Tympanic

 

   Pulse 64 /min  Comments: Pattern: Regular

 

   Respiration Rate 14 /min  Comments: Pattern: Unlabored

 

   O2 SAT 97 %  Comments: Room air

 

   BP Systolic 146 mm[Hg]  Comments: Patient Position: Sitting; Cuff



     Location: Right Arm; Cuff Size: Large

 

   BP Diastolic 70 mm[Hg]  Comments: Patient Position: Sitting; Cuff



     Location: Right Arm; Cuff Size: Large

 

   Weight 260 lb  

 

   Height 66 in  

 

   BMI 41.96 kg/m2  

 

   BSA 2.24 m2  

 

 2018 12:56    

 

   Temperature 97.2 f  Comments: Method: Tympanic

 

   Pulse 76 /min  Comments: Pattern: Regular

 

   Respiration Rate 16 /min  Comments: Pattern: Unlabored

 

   O2 SAT 98 %  Comments: Room air

 

   BP Systolic 124 mm[Hg]  Comments: Patient Position: Sitting; Cuff



     Location: Left Arm; Cuff Size: Standard

 

   BP Diastolic 80 mm[Hg]  Comments: Patient Position: Sitting; Cuff



     Location: Left Arm; Cuff Size: Standard

 

   Weight 256 lb  

 

   Height 66 in  

 

   BMI 41.32 kg/m2  

 

   BSA 2.22 m2  

 

 15-Nov-2017 10:37    Comments: Exertional SaO2 on room air -



     96%

 

   Temperature 98 f  Comments: Method: Tympanic

 

   Pulse 93 /min  Comments: Pattern: Regular

 

   Respiration Rate 14 /min  Comments: Pattern: Unlabored

 

   O2 SAT 98 %  Comments: Room air

 

   BP Systolic 126 mm[Hg]  Comments: Patient Position: Sitting; Cuff



     Location: Left Arm; Cuff Size: Standard

 

   BP Diastolic 78 mm[Hg]  Comments: Patient Position: Sitting; Cuff



     Location: Left Arm; Cuff Size: Standard

 

   Weight 251 lb  

 

   Height 66 in  

 

   BMI 40.51 kg/m2  

 

   BSA 2.2 m2  

 

 29-Sep-2017 11:00    

 

   Temperature 97.4 f  Comments: Method: Tympanic

 

   Pulse 84 /min  Comments: Pattern: Regular

 

   Respiration Rate 16 /min  Comments: Pattern: Unlabored

 

   O2 SAT 97 %  Comments: Room air

 

   BP Systolic 124 mm[Hg]  Comments: Patient Position: Sitting; Cuff



     Location: Left Arm; Cuff Size: Standard

 

   BP Diastolic 68 mm[Hg]  Comments: Patient Position: Sitting; Cuff



     Location: Left Arm; Cuff Size: Standard

 

   Weight 249 lb  

 

   Height 66 in  

 

   BMI 40.19 kg/m2  

 

   BSA 2.2 m2  

 

 14-Dec-2016 11:54    

 

   Temperature 97.1 f  Comments: Method: Tympanic

 

   Pulse 68 /min  Comments: Pattern: Regular

 

   Respiration Rate 12 /min  Comments: Pattern: Unlabored

 

   O2 SAT 97 %  Comments: Room air

 

   BP Systolic 136 mm[Hg]  Comments: Patient Position: Sitting; Cuff



     Location: Left Arm; Cuff Size: Standard

 

   BP Diastolic 78 mm[Hg]  Comments: Patient Position: Sitting; Cuff



     Location: Left Arm; Cuff Size: Standard

 

   Weight 246 lb  

 

   Height 66 in  

 

   BMI 39.71 kg/m2  

 

   BSA 2.18 m2  

 

 2016 10:14    

 

   Temperature 97.8 f  Comments: Method: Tympanic

 

   Pulse 66 /min  Comments: Pattern: Regular

 

   Respiration Rate 16 /min  Comments: Pattern: Unlabored

 

   O2 SAT 97 %  Comments: Room air

 

   BP Systolic 132 mm[Hg]  Comments: Patient Position: Sitting; Cuff



     Location: Right Arm; Cuff Size: Large

 

   BP Diastolic 76 mm[Hg]  Comments: Patient Position: Sitting; Cuff



     Location: Right Arm; Cuff Size: Large

 

   Weight 227 lb  

 

   Height 66 in  

 

   BMI 36.64 kg/m2  

 

   BSA 2.11 m2  

 

 2015 8:44    

 

   Temperature 97.7 f  Comments: Method: Tympanic

 

   Pulse 59 /min  Comments: Pattern: Regular

 

   Respiration Rate 16 /min  Comments: Pattern: Unlabored

 

   O2 SAT 99 %  Comments: Room air

 

   BP Systolic 134 mm[Hg]  Comments: Patient Position: Sitting; Cuff



     Location: Left Arm; Cuff Size: Standard

 

   BP Diastolic 78 mm[Hg]  Comments: Patient Position: Sitting; Cuff



     Location: Left Arm; Cuff Size: Standard

 

   Weight 208 lb  

 

   Height 66 in  

 

   BMI 33.57 kg/m2  

 

   BSA 2.03 m2  

 

 2015 10:50    

 

   Temperature 98.2 f  Comments: Method: Tympanic

 

   Pulse 59 /min  Comments: Pattern: Regular

 

   Respiration Rate 12 /min  Comments: Pattern: Unlabored

 

   O2 SAT 98 %  Comments: Room air

 

   BP Systolic 140 mm[Hg]  Comments: Patient Position: Sitting; Cuff



     Location: Left Arm; Cuff Size: Large

 

   BP Diastolic 60 mm[Hg]  Comments: Patient Position: Sitting; Cuff



     Location: Left Arm; Cuff Size: Large

 

   Weight 225 lb  

 

   Height 66 in  

 

   BMI 36.32 kg/m2  

 

   BSA 2.1 m2  

 

 6-Oct-2014 14:42    

 

   Temperature 96.8 f  Comments: Method: Tympanic

 

   Pulse 61 /min  Comments: Pattern: Regular

 

   Respiration Rate 18 /min  Comments: Pattern: Unlabored

 

   O2 SAT 96 %  Comments: Room air

 

   BP Systolic 122 mm[Hg]  Comments: Patient Position: Sitting; Cuff



     Location: Left Arm; Cuff Size: Standard

 

   BP Diastolic 68 mm[Hg]  Comments: Patient Position: Sitting; Cuff



     Location: Left Arm; Cuff Size: Standard

 

   Weight 240 lb  

 

   Height 66 in  

 

   BMI 38.74 kg/m2  

 

   BSA 2.16 m2  

 

 2014 11:21    

 

   Temperature 97.4 f  Comments: Method: Tympanic

 

   Pulse 70 /min  Comments: Pattern: Regular

 

   Respiration Rate 18 /min  Comments: Pattern: Unlabored

 

   O2 SAT 96 %  Comments: Room air

 

   BP Systolic 108 mm[Hg]  Comments: Patient Position: Sitting; Cuff



     Location: Left Arm; Cuff Size: Standard

 

   BP Diastolic 68 mm[Hg]  Comments: Patient Position: Sitting; Cuff



     Location: Left Arm; Cuff Size: Standard

 

   Weight 235 lb  

 

   Height 64 in  

 

   BMI 40.34 kg/m2  

 

   BSA 2.09 m2  

 

 30-May-2014 12:18    Comments: Exertional SaO2 -96%

 

   Temperature 96.8 f  Comments: Method: Tympanic

 

   Pulse 68 /min  Comments: Pattern: Regular

 

   Respiration Rate 18 /min  Comments: Pattern: Unlabored

 

   O2 SAT 98 %  Comments: Room air

 

   BP Systolic 128 mm[Hg]  Comments: Patient Position: Sitting; Cuff



     Location: Left Arm; Cuff Size: Standard

 

   BP Diastolic 68 mm[Hg]  Comments: Patient Position: Sitting; Cuff



     Location: Left Arm; Cuff Size: Standard

 

   Weight 239 lb  

 

   Height 66 in  

 

   BMI 38.58 kg/m2  

 

   BSA 2.16 m2  

 

 1-Oct-2013 10:06    

 

   Temperature 98 f  Comments: Method: Tympanic

 

   Pulse 58 /min  Comments: Pattern: Regular

 

   Respiration Rate 16 /min  Comments: Pattern: Unlabored

 

   BP Systolic 152 mm[Hg]  Comments: Patient Position: Sitting; Cuff



     Location: Left Arm; Cuff Size: Large

 

   BP Diastolic 60 mm[Hg]  Comments: Patient Position: Sitting; Cuff



     Location: Left Arm; Cuff Size: Large

 

   Weight 227 lb  

 

   Height 64 in  

 

   BMI 38.96 kg/m2  

 

   BSA 2.06 m2  

 

 27-Mar-2013 14:52    Comments: exertional O2-94% RA

 

   Temperature 97.6 f  Comments: Method: Tympanic

 

   Pulse 71 /min  Comments: Pattern: Regular

 

   Respiration Rate 18 /min  Comments: Pattern: Unlabored

 

   O2 SAT 98 %  Comments: Room air

 

   BP Systolic 118 mm[Hg]  Comments: Patient Position: Sitting; Cuff



     Location: Left Arm; Cuff Size: Standard

 

   BP Diastolic 72 mm[Hg]  Comments: Patient Position: Sitting; Cuff



     Location: Left Arm; Cuff Size: Standard

 

   Weight 232 lb  

 

   Height 64 in  

 

   BMI 39.82 kg/m2  

 

   BSA 2.08 m2  

 

 27-Sep-2012 13:06    

 

   Temperature 98.5 f  Comments: Method: Tympanic

 

   Pulse 62 /min  Comments: Pattern: Regular

 

   Respiration Rate 14 /min  Comments: Pattern: Unlabored

 

   BP Systolic 124 mm[Hg]  Comments: Patient Position: Sitting; Cuff



     Location: Left Arm; Cuff Size: Standard

 

   BP Diastolic 60 mm[Hg]  Comments: Patient Position: Sitting; Cuff



     Location: Left Arm; Cuff Size: Standard

 

   Weight 225 lb  

 

   Height 66 in  

 

   BMI 36.32 kg/m2  

 

   BSA 2.1 m2  

 

 27-Sep-2012 12:58    Comments: Exertional SaO2 -95%

 

   O2 SAT 98 %  Comments: Room air

 

 27-Mar-2012 12:42    

 

   Temperature 96.8 f  Comments: Method: Tympanic

 

   Pulse 68 /min  Comments: Pattern: Regular

 

   Respiration Rate 20 /min  Comments: Pattern: Unlabored

 

   O2 SAT 98 %  Comments: Room air

 

   BP Systolic 122 mm[Hg]  Comments: Patient Position: Sitting; Cuff



     Location: Left Arm; Cuff Size: Standard

 

   BP Diastolic 78 mm[Hg]  Comments: Patient Position: Sitting; Cuff



     Location: Left Arm; Cuff Size: Standard

 

   Weight 227 lb  

 

   Height 66 in  

 

   BMI 36.64 kg/m2  

 

   BSA 2.11 m2  

 

 27-Sep-2011 13:26    

 

   Temperature 97.8 f  Comments: Method: Tympanic

 

   Pulse 80 /min  Comments: Pattern: Regular

 

   Respiration Rate 18 /min  Comments: Pattern: Unlabored

 

   O2 SAT 98 %  Comments: Room air

 

   BP Systolic 118 mm[Hg]  Comments: Patient Position: Sitting; Cuff



     Location: Right Arm; Cuff Size: Standard

 

   BP Diastolic 64 mm[Hg]  Comments: Patient Position: Sitting; Cuff



     Location: Right Arm; Cuff Size: Standard

 

   Weight 249 lb  

 

   Height 64 in  

 

   BMI 42.74 kg/m2  

 

   BSA 2.15 m2  







Advance Directives







 HIPAA - Effective on 2017. Expiration date unspecified.  Effective: 29-Sep
-2017  



 Scanned Document is available upon request.    







Encounters







 Office Visit  9-Sep-2019 11:15  To 9-Sep-2019 11:51  



 Encounter Reason:                                     ASTHMA, FOLLOW UP - The 
last clinic visit was 6 month(s) ago. Management changes made at the last [facility] Office  



 it include none (tried dose reduction on ISC with samples, didn't tolerate). 
The patient's asthma causes daytime symptoms 1 to 2 times per week. The patient'
s asthma is not disturbing sleep. Symptoms in    



 clude wheezing, shortness of breath and productive cough (with clear sputum), 
while symptoms do not include chest tightness, non-productive cough or chest 
pain. The patient describes the difficulty ivelisse    



 thing as dyspnea on exertion. Onset of symptoms was gradual year(s) ago. The 
episodes occur weekly. The patient describes this as unchanged (although she 
was recenly on oral steroids for exacerbation re    



 lated to allergy symptoms. still on xolair). Symptoms are exacerbated by cold 
temperature and activity (also allergy symptoms). Symptoms are relieved by 
inhaler use, rest and oral steroids (with acute e    



 pisodes. most recently summer 2019 and summer 2018). Associated symptoms 
include allergy symptoms, while associated symptoms do not include fever or 
upper respiratory infection symptoms. Current treatme    



 nt includes inhaled albuterol, inhaled long-acting beta-2 agonists, inhaled 
corticosteroids (dulera 200/5mcg, 2 puffs bid), inhaled anticholinergics (
spiriva respimat 2.5mcg), leukotriene modifiers and    



 omalizumab (Xolair) (started 2019 with allergist, q 2 week dosing). 
Bronchodilator use is becoming more frequent (with recent exacerbation 2-3 times
/day max). By report there is good compliance with    



  treatment, good tolerance of treatment and fair symptom control. Pertinent 
medical history includes other pulmonary disease, allergic rhinitis and 
gastroesophageal reflux. The patient has been exposed    



 to animal dander (1 dog), while the patient has not been exposed to wood 
burning stove, mold/mildew in the home or tobacco smoke. Past evaluation has 
included chest CT (2018, emphysematous changes).,    



  [ADDITIONAL REASON] Sleep follow up - The sleep disorder is characterized as 
obstructive sleep apnea. The patient gets approximately 9 hours of sleep per 
night. Current treatment includes CPAP therapy    



 (home care company is in [place]). Current symptoms do not include excessive 
daytime sleepiness, snoring (sleeps alone, denies snorting arousals) or weight 
gain. The patient describes this as unchanged    



 . Symptoms are relieved by CPAP use. Associated symptoms do not include 
morning headaches. Since diagnosis the disease has been unchanged. Initial 
diagnosis of sleep disorder was year(s) ago. Recently t    



 he disease has been unchanged. Past evaluation has included nighttime sleep 
study. Past treatment has included CPAP therapy.    



 Encounter Diagnosis:                                 ASTHMA-COPD OVERLAP 
SYNDROME, GASTROESOPHAGEAL REFLUX DISEASE, OBESITY (Renamed from OBESE), 
OBSTRUCTIVE SLEEP APNEA    

 

 Office Visit  1-Mar-2019 12:04  To 1-Mar-2019 13:34  



 Encounter Reason:                                     ASTHMA, FOLLOW UP - The 
last clinic visit was 6 month(s) ago. Management changes made at the last [facility] Office  



 it include stopping qvar. The patient's asthma causes daytime symptoms 1 to 2 
times per week. The patient's asthma is not disturbing sleep. Symptoms include 
shortness of breath and productive cough (wit    



 h clear sputum), while symptoms do not include wheezing, chest tightness, non-
productive cough or chest pain. The patient describes the difficulty breathing 
as dyspnea on exertion. Onset of symptoms was    



  gradual year(s) ago. The episodes occur weekly. The patient describes this as 
improving (started xolair with allergist 6 weeks ago, already noticing benefit 
with mostly a reduction in cough). Symptoms    



 are exacerbated by cold temperature and activity (also allergy symptoms). 
Symptoms are relieved by inhaler use, rest and oral steroids (with acute 
episodes. most recently summer 2018). Associated sympto    



 ms include allergy symptoms (sporadic pnd), while associated symptoms do not 
include fever or upper respiratory infection symptoms. Current treatment 
includes inhaled albuterol, inhaled long-acting beta    



 -2 agonists, inhaled corticosteroids (dulera 2 puffs bid), inhaled 
anticholinergics (spiriva respimat 2.5mcg), leukotriene modifiers and 
omalizumab (Xolair) (started 2019 with allergist). Bronchodil    



 ator use is becoming less frequent (hasn't needed). By report there is good 
compliance with treatment, good tolerance of treatment and good symptom 
control. Pertinent medical history includes other pulm    



 onary disease (has sleep apnea, using auto titrating cpap at bedtime. home 
care company is [employer]. more rested with cpap), allergic rhinitis 
and gastroesophageal reflux. The patient has be    



 en exposed to animal dander (1 dog), while the patient has not been exposed to 
wood burning stove, mold/mildew in the home or tobacco smoke. Past evaluation 
has included chest CT (2018, emphysematous changes).    



 Encounter Diagnosis:                                 ASTHMA-COPD OVERLAP 
SYNDROME, OBSTRUCTIVE SLEEP APNEA, OBESITY (Renamed from OBESE), 
GASTROESOPHAGEAL REFLUX DISEASE    

 

 Office Visit  2018 11:18  To 2018 12:24  



 Encounter Reason:                                     ASTHMA, FOLLOW UP - The 
last clinic visit was 6 month(s) ago. No changes in management were made at t  
[facility]  



 he last visit. The patient's asthma causes daytime symptoms most days. The 
patient's asthma is not disturbing sleep. Symptoms include wheezing, shortness 
of breath and productive cough (with clear sputu    



 m), while symptoms do not include chest tightness, non-productive cough or 
chest pain. The patient describes the difficulty breathing as dyspnea on 
exertion. Onset of symptoms was gradual year(s) ago. T    



 he episodes occur daily. The patient describes this as unchanged. Symptoms are 
exacerbated by cold temperature and activity (also allergy symptoms). Symptoms 
are relieved by inhaler use, rest and oral s    



 teroids (with acute episodes in 2017 and oct 2017). Associated symptoms 
include allergy symptoms (sporadic pnd), while associated symptoms do not 
include fever or upper respiratory infection symp    



 toms. Current treatment includes inhaled albuterol (has not used, has had flu 
vaccine), inhaled long-acting beta-2 agonists, inhaled corticosteroids (dulera 
2 puffs bid and with qvar 2 puffs noon), inha    



 led anticholinergics (spiriva respimat daily) and leukotriene modifiers (
singulair daily). Bronchodilator use is staying the same. By report there is 
good compliance with treatment, good tolerance of tr    



 eatment and good symptom control. Pertinent medical history includes other 
pulmonary disease (has sleep apnea, using auto titrating cpap at bedtime. home 
care company is [employer]), allergic    



 rhinitis and gastroesophageal reflux. The patient has been exposed to animal 
dander (1 dog), while the patient has not been exposed to wood burning stove, 
mold/mildew in the home or tobacco smoke. Past    



 evaluation has included chest CT (2018, emphysematous changes).    



 Encounter Diagnosis:                                 ASTHMA (Renamed from 
AIRWAY HYPERREACTIVITY), OBSTRUCTIVE SLEEP APNEA, OBESITY (Renamed from OBESE) 
   

 

 Order Only  10-Kuldip-2018 8:50  To 10-Kuldip-2018 8:51  



 Encounter Diagnosis:                                 ASTHMA (Renamed from 
AIRWAY HYPERREACTIVITY)  [facility]  

 

 Office Visit  2018 12:51  To 2018 13:18  



 Encounter Reason:                                     ASTHMA, FOLLOW UP - The 
last clinic visit was 2 month(s) ago. Management changes made at the last vis  
[facility]  



 it include none (Increase Spiriva respimat to 2.5 

g formulation, to use Qvar midday, and to use flutter valve, fluid intake and 
Mucinex as prescribed.). The patient's asthma causes daytime symptoms    



 most days. The patient's asthma is not disturbing sleep. Symptoms include 
wheezing (decreased since last appt.), shortness of breath (improved, s/s only 
with exertion such as walking quickly) and produc    



 tive cough (was continuous and now sporadic, 2-3x/day, clear sputum), while 
symptoms do not include chest tightness, non-productive cough or chest pain. 
The patient describes the difficulty breathing as    



  dyspnea on exertion. Onset of symptoms was gradual year(s) ago. The episodes 
occur daily. The patient describes this as improving. Symptoms are exacerbated 
by cold temperature and activity (also allerg    



 y symptoms). Symptoms are relieved by inhaler use, rest and oral steroids (
with acute episodes in 2017 and oct 2017). Associated symptoms include 
allergy symptoms (sporadic pnd), while associated    



  symptoms do not include fever or upper respiratory infection symptoms. 
Current treatment includes inhaled albuterol (has not used, has had flu vaccine)
, inhaled long-acting beta-2 agonists, inhaled cor    



 ticosteroids (dulera 2 puffs bid and with qvar 2 puffs noon), inhaled 
anticholinergics (spiriva respimat daily) and leukotriene modifiers (singulair 
daily). Bronchodilator use is becoming less frequent.    



  By report there is good compliance with treatment, good tolerance of 
treatment and good symptom control. Pertinent medical history includes other 
pulmonary disease (has sleep apnea, using auto titratin    



 g cpap q hs), allergic rhinitis and gastroesophageal reflux. The patient has 
been exposed to animal dander (1 dog), while the patient has not been exposed 
to wood burning stove, mold/mildew in the home    



 or tobacco smoke. The patient is currently able to do activities of daily 
living without limitations and able to do housework without limitations (at 
times tired). Past evaluation has included chest x-r    



 ay (had CABG 2015 and we now have image reports. she had post op left 
effusion and had TAP. we don't have TAP results but she is unaware of any 
significant finding. she also had cardiac pacemaker p    



 laced since her last visit here without complication).    



 Encounter Diagnosis:                                 GASTROESOPHAGEAL REFLUX 
DISEASE, OBESITY (Renamed from OBESE), ALLERGIC RHINITIS, OBSTRUCTIVE SLEEP 
APNEA, HYPERTENSION, ASTHMA-COPD OVERLAP SYNDROME    

 

 Historical Summary  2018 11:48  To 2018 11:52  



 Encounter Reason:                                     ASTHMA, FOLLOW UP - The 
last clinic visit was 2 month(s) ago. Management changes made at the last [facility]  



 it include none (Increase Spiriva respimat to 2.5 

g formulation, to use Qvar midday, and to use flutter valve, fluid intake and 
Mucinex as prescribed.). The patient's asthma causes daytime symptoms    



 most days. The patient's asthma is not disturbing sleep. Symptoms include 
wheezing, shortness of breath and productive cough (clear sputum), while 
symptoms do not include chest tightness, non-productive    



  cough or chest pain. The patient describes the difficulty breathing as 
dyspnea on exertion. Onset of symptoms was gradual year(s) ago. The episodes 
occur daily. The patient describes this as worsening    



 (in 2017 with more coughing, more congestion. mucous can be quite thick. hasn'
t tried mucinex. hasn't tired flutter device). Symptoms are exacerbated by cold 
temperature and activity (also allergy sympt    



 oms). Symptoms are relieved by inhaler use, rest and oral steroids (with acute 
episodes in 2017 and oct 2017). Associated symptoms include allergy 
symptoms (pnd), while associated symptoms do not    



  include fever. Current treatment includes inhaled albuterol (prn and has not 
used, has had flu vaccine), inhaled long-acting beta-2 agonists, inhaled 
corticosteroids (dulera 2 puffs bid and now additio    



 nal ICS with qvar 2 puffs in the morning), inhaled anticholinergics (spiriva 
respimat) and leukotriene modifiers. Bronchodilator use is becoming more 
frequent (currently q 4 hrs with some benefit). By r    



 eport there is good compliance with treatment, good tolerance of treatment and 
fair symptom control. Pertinent medical history includes other pulmonary 
disease (has sleep apnea, using auto titrating cpa    



 p now. home care company is [email or web address]. sleeps better on cpap and uses 
it nightly. not snoring on cpap that she is aware of), allergic rhinitis and 
gastroesophageal reflux. The patient has been    



 exposed to animal dander (1 dog), while the patient has not been exposed to 
wood burning stove, mold/mildew in the home or tobacco smoke. Past evaluation 
has included chest x-ray (had CABG 2015 and    



  we now have image reports. she had post op left effusion and had TAP. we don'
t have TAP results but she is unaware of any significant finding. she also had 
cardiac pacemaker placed since her last visit here without complication).    

 

 Office Visit  2018 10:02  To 2018 14:01  



 Encounter Diagnosis:                                 ASTHMA (Renamed from 
AIRWAY HYPERREACTIVITY)  [facility]  

 

 Radiology  2018 10:02  To 2018 10:20  



 Encounter Diagnosis:                                 PLEURAL EFFUSION, LEFT  
[facility]  

 

 Office Visit  15-Nov-2017 10:26  To 15-Nov-2017 12:43  



 Encounter Reason:                                     ASTHMA, FOLLOW UP - The 
last clinic visit was 2 month(s) ago. Management changes made at the last [facility]  



 it include none (with increased coughing since last visit. has been on abx and 
oral steroids (once each) with some benefit. still not back to baseline. 
started qvar 2 puff daily also with benefit. takin    



 g meds for reflux and allergy. working with allergist as well. will be having 
blood work done with them soon, may consider starting allergy shots again. she 
isn't sure if they are considering xolair). T    



 he patient's asthma causes daytime symptoms most days. The patient's asthma is 
not disturbing sleep. Symptoms include wheezing, shortness of breath and 
productive cough (clear sputum), while symptoms do    



  not include chest tightness, non-productive cough or chest pain. The patient 
describes the difficulty breathing as dyspnea on exertion. Onset of symptoms 
was gradual year(s) ago. The episodes occur yobani    



 ly. The patient describes this as worsening (in 2017 with more coughing, more 
congestion. mucous can be quite thick. hasn't tried mucinex. hasn't tired 
flutter device). Symptoms are exacerbated by cold    



 temperature and activity (also allergy symptoms). Symptoms are relieved by 
inhaler use, rest and oral steroids (with acute episodes in 2017 and oct 
2017). Associated symptoms include allergy symp    



 toms (pnd), while associated symptoms do not include fever. Current treatment 
includes inhaled albuterol (prn and has not used, has had flu vaccine), inhaled 
long-acting beta-2 agonists, inhaled cortico    



 steroids (dulera 2 puffs bid and now additional ICS with qvar 2 puffs in the 
morning), inhaled anticholinergics (spiriva respimat) and leukotriene 
modifiers. Bronchodilator use is becoming more frequent    



  (currently q 4 hrs with some benefit). By report there is good compliance 
with treatment, good tolerance of treatment and fair symptom control. Pertinent 
medical history includes other pulmonary diseas    



 e (has sleep apnea, using auto titrating cpap now. home care company is 
[email or web address]. sleeps better on cpap and uses it nightly. not snoring on 
cpap that she is aware of), allergic rhinitis and ga    



 stroesophageal reflux. The patient has been exposed to animal dander (1 dog), 
while the patient has not been exposed to wood burning stove, mold/mildew in 
the home or tobacco smoke. Past evaluation has    



 included chest x-ray (had CABG 2015 and we now have image reports. she 
had post op left effusion and had TAP. we don't have TAP results but she is 
unaware of any significant finding. she also had c    



 ardiac pacemaker placed since her last visit here without complication).    



 Encounter Diagnosis:                                 ASTHMA (Renamed from 
AIRWAY HYPERREACTIVITY), GASTROESOPHAGEAL REFLUX DISEASE, ALLERGIC RHINITIS, 
OBSTRUCTIVE SLEEP APNEA, OBESITY (Renamed from OBESE)    

 

 Office Visit  29-Sep-2017 10:02  To 29-Sep-2017 12:23  



 Encounter Reason:                                     ASTHMA, FOLLOW UP - The 
last clinic visit was 9 month(s) ago (primary is Dr. Nicole Murdock. also [facility]  



 eing Dr. Nolen in [place]). No changes in management were made at the last 
visit. The patient's asthma causes daytime symptoms 1 to 2 times per month (
more with URI and/or sinus disease and seen her all    



 ergist recently for more sinus/allergy symptoms. spiriva respimat was added 
which she has found helpful). The patient's asthma is not disturbing sleep. 
Symptoms include wheezing, shortness of breath and    



  productive cough (clear sputum), while symptoms do not include chest tightness
, non-productive cough or chest pain. The patient describes the difficulty 
breathing as dyspnea on exertion. The episodes o    



 ccur daily (just within the past 3 weeks with more sinus symptoms). The 
patient describes this as mild and unchanged. Symptoms are exacerbated by cold 
temperature and activity (also allergy symptoms spo    



 radically). Symptoms are relieved by inhaler use, rest and oral steroids (with 
acute episodes, last was 2017). Associated symptoms include allergy 
symptoms (pnd), while associated symptoms do not    



  include fever. Current treatment includes inhaled albuterol (prn and has not 
used, has had flu vaccine), inhaled long-acting beta-2 agonists, inhaled 
corticosteroids (dulera 2 puffs bid), inhaled antic    



 holinergics (spiriva respimat) and leukotriene modifiers (singulair daily). 
Bronchodilator use is staying the same (hasn't thought to use nebulized 
albuterol with recent symptoms-just forgot about it un    



 til she was doing neb in our office today with spirometry). By report there is 
good compliance with treatment, good tolerance of treatment and fair symptom 
control. Pertinent medical history includes ot    



 her pulmonary disease (has sleep apnea, using auto titrating cpap now. home 
care company is [email or web address]. sleeps better on cpap and uses it nightly), 
allergic rhinitis and gastroesophageal reflux.    



  The patient has been exposed to animal dander (1 dog), while the patient has 
not been exposed to wood burning stove, mold/mildew in the home or tobacco 
smoke. Past evaluation has included chest x-ray (    



 had CABG 2015 and we now have image reports. she had post op left 
effusion and had TAP. we don't have TAP results but she is unaware of any 
significant finding. she also had cardiac pacemaker place    



 d since her last visit here without complication).    



 Encounter Diagnosis:                                 ASTHMA (Renamed from 
AIRWAY HYPERREACTIVITY), OBSTRUCTIVE SLEEP APNEA, OBESITY (Renamed from OBESE), 
ALLERGIC RHINITIS, GASTROESOPHAGEAL REFLUX DISEASE    

 

 Order Only  14-Dec-2016 15:51  To 14-Dec-2016 15:52  



 Encounter Diagnosis:                                 ASTHMA (Renamed from 
AIRWAY HYPERREACTIVITY)  [facility]  

 

 Historical Summary  14-Dec-2016 11:50  To 14-Dec-2016 11:51  



   [facility]  

 

 Historical Summary  14-Dec-2016 11:30  To 14-Dec-2016 11:31  



 Encounter Reason:                                     ASTHMA, FOLLOW UP - The 
last clinic visit was 6 month(s) ago (primary is Dr. Nicole Murdock, seeing  
[facility]  



 Dr. Nayana Velarde, and Dr. Quinton Borja). Management changes made at 
the last visit include none (except to use CPAP nightly and lose weight, 
address allergy and GERD, and check pleural fluid res    



 ults.). The patient's asthma causes daytime symptoms 1 to 2 times per month. 
The patient's asthma is not disturbing sleep. Symptoms include shortness of 
breath (minimally at this time) and non-productiv    



 e cough, while symptoms do not include wheezing, chest tightness, productive 
cough or chest pain. The patient describes the difficulty breathing as dyspnea 
on exertion. The patient describes this as unc    



 hanged (except with recent infection, improving). Symptoms are exacerbated by 
cold temperature and activity (also allergy symptoms). Symptoms are relieved by 
inhaler use, rest and oral steroids (last wa    



 s spring 2015). Associated symptoms do not include fever, upper respiratory 
infection symptoms or allergy symptoms. Current treatment includes inhaled 
albuterol, inhaled long-acting beta-2 agonists, inh    



 aled corticosteroids (dulera 100/5 2 puffs bid) and leukotriene modifiers. 
Bronchodilator use is staying the same (hasn't needed). By report there is good 
compliance with treatment, good tolerance of tr    



 eatment and fair symptom control. Pertinent medical history includes other 
pulmonary disease (has sleep apnea, using auto titrating cpap now. home care 
company is [email or web address]), allergic rhinitis    



 and gastroesophageal reflux. The patient has been exposed to animal dander (1 
dog), while the patient has not been exposed to wood burning stove, mold/mildew 
in the home or tobacco smoke. Past evaluatio    



 n has included chest x-ray (had CABG 2015 and we now have image reports. 
she had post op left effusion and had TAP. we don't have TAP results but she is 
unaware of any significant finding. she also    



  had cardiac pacemaker placed since her last visit here without complication).
    

 

 Office Visit  14-Dec-2016 11:26  To 14-Dec-2016 12:10  



 Encounter Reason:                                     ASTHMA, FOLLOW UP - The 
last clinic visit was 6 month(s) ago (primary is Dr. Nicole Murdock, [facility]  



 Dr. Nayana Velarde, and Dr. Quinton Borja). Management changes made at 
the last visit include none (except to use CPAP nightly which she has done, 
lose weight but has gained 19# since last visit an    



 d to be getting bariatric consult 2017, is addressing allergy and GERD.). 
The patient's asthma causes daytime symptoms 1 to 2 times per month. The patient
's asthma is not disturbing sleep. Symptoms in    



 clude shortness of breath (a little worse, ? related to weight) and productive 
cough (clear sputum), while symptoms do not include wheezing, chest tightness, 
non-productive cough or chest pain. The chris    



 ent describes the difficulty breathing as dyspnea on exertion. The episodes 
occur daily. The patient describes this as mild and worsening (slightly). 
Symptoms are exacerbated by cold temperature and act    



 ivity (also allergy symptoms sporadically). Symptoms are relieved by inhaler 
use, rest and oral steroids (with acute episodes, last was spring 2015). 
Associated symptoms do not include fever, upper resp    



 iratory infection symptoms or allergy symptoms. Current treatment includes 
inhaled albuterol (prn and has not used, has had flu vaccine), inhaled long-
acting beta-2 agonists, inhaled corticosteroids (du    



 ingrid 100/5 2 puffs bid) and leukotriene modifiers (singulair daily). 
Bronchodilator use is staying the same (hasn't needed). By report there is good 
compliance with treatment, good tolerance of treatmen    



 t and good symptom control. Pertinent medical history includes other pulmonary 
disease (has sleep apnea, using auto titrating cpap now. home care company is 
[email or web address]), allergic rhinitis and ga    



 stroesophageal reflux. The patient has been exposed to animal dander (1 dog), 
while the patient has not been exposed to wood burning stove, mold/mildew in 
the home or tobacco smoke. The patient is curre    



 ntly able to do activities of daily living without limitations and able to do 
housework without limitations. Past evaluation has included chest x-ray (had 
CABG 2015 and we now have image reports. s    



 he had post op left effusion and had TAP. we don't have TAP results but she is 
unaware of any significant finding. she also had cardiac pacemaker placed since 
her last visit here without complication).    



 Encounter Diagnosis:                                 ALLERGIC RHINITIS, ASTHMA 
(Renamed from AIRWAY HYPERREACTIVITY), OBESITY (Renamed from OBESE), 
OBSTRUCTIVE SLEEP APNEA, GASTROESOPHAGEAL REFLUX DISEASE    

 

 Office Visit  2016 9:46  To 2016 11:08  



 Encounter Reason:                                     ASTHMA, FOLLOW UP - The 
last clinic visit was 7 month(s) ago (primary is Dr. Nicole Murdock, seeing  
[facility]  



 Dr. Nayana Velarde, and Dr. Quinton Borja). No changes in management were 
made at the last visit. The patient's asthma causes daytime symptoms 1 to 2 
times per month. The patient's asthma is not di    



 sturbing sleep. Symptoms include shortness of breath (minimally at this time) 
and non-productive cough, while symptoms do not include wheezing, chest 
tightness, productive cough or chest pain. The patie    



 nt describes the difficulty breathing as dyspnea on exertion. The patient 
describes this as unchanged (except with recent infection, improving). Symptoms 
are exacerbated by cold temperature and activity    



  (also allergy symptoms). Symptoms are relieved by inhaler use, rest and oral 
steroids (last was spring 2015). Associated symptoms do not include fever, 
upper respiratory infection symptoms or allergy s    



 ymptoms. Current treatment includes inhaled albuterol, inhaled long-acting beta
-2 agonists, inhaled corticosteroids (dulera 100/5 2 puffs bid) and leukotriene 
modifiers. Bronchodilator use is staying  same (hasn't needed). By report there is good compliance with treatment, 
good tolerance of treatment and fair symptom control. Pertinent medical history 
includes other pulmonary disease (has sleep apn    



 ea, using auto titrating cpap now. home care company is [email or web address]), 
allergic rhinitis and gastroesophageal reflux. The patient has been exposed to 
animal dander (1 dog), while the patient has n    



 ot been exposed to wood burning stove, mold/mildew in the home or tobacco 
smoke. Past evaluation has included chest x-ray (had CABG 2015 and we now 
have image reports. she had post op left effusion    



  and had TAP. we don't have TAP results but she is unaware of any significant 
finding. she also had cardiac pacemaker placed since her last visit here 
without complication).    



 Encounter Diagnosis:                                 ASTHMA (Renamed from 
AIRWAY HYPERREACTIVITY), PLEURAL EFFUSION, LEFT, OBSTRUCTIVE SLEEP APNEA, 
OBESITY (Renamed from OBESE), GASTROESOPHAGEAL REFLUX DISEASE, ALLERGIC 
RHINITIS    

 

 Office Visit  2015 8:27  To 2015 9:09  



 Encounter Reason:                                     ASTHMA, FOLLOW UP - The 
last clinic visit was 7 month(s) ago (primary is Dr. Nicole Murdock, seeing  
[facility]  



 Dr. Nayana Velarde, and Dr. Quinton Borja). No changes in management were 
made at the last visit. The patient's asthma causes daytime symptoms 1 to 2 
times per month. The patient's asthma is not di    



 sturbing sleep. Symptoms include shortness of breath (minimally at this time, 
less active as she is waiting to start cardiac rehab after CABG) and non-
productive cough, while symptoms do not include whe    



 ezing, chest tightness, productive cough or chest pain. The patient describes 
the difficulty breathing as dyspnea on exertion. The patient describes this as 
improving. Symptoms are exacerbated by cold t    



 emperature and activity (also allergy symptoms). Symptoms are relieved by 
inhaler use, rest and oral steroids (last was spring 2015). Associated symptoms 
do not include fever, upper respiratory infectio    



 n symptoms or allergy symptoms. Current treatment includes inhaled albuterol, 
inhaled long-acting beta-2 agonists, inhaled corticosteroids (dulera 200/5 2 
puffs bid) and leukotriene modifiers. Bronchodi    



 lator use is becoming less frequent. By report there is good compliance with 
treatment, good tolerance of treatment and fair symptom control. Pertinent 
medical history includes other pulmonary disease (    



 has sleep apnea, using auto titrating cpap now without issue. able to use all 
night. home care company is [employer]), allergic rhinitis and gastroesophageal 
reflux. The patient has been exposed to animal    



  dander (1 dog), while the patient has not been exposed to wood burning stove, 
mold/mildew in the home or tobacco smoke.    



 Encounter Diagnosis:                                 ASTHMA (Renamed from 
AIRWAY HYPERREACTIVITY), OBSTRUCTIVE SLEEP APNEA, OBESITY (Renamed from OBESE), 
GASTROESOPHAGEAL REFLUX DISEASE, ALLERGIC RHINITIS    

 

 Office Visit  2015 10:36  To 2015 13:38  



 Encounter Reason:                                     ASTHMA, FOLLOW UP - The 
last clinic visit was 5 month(s) ago (primary is Dr. Nicole Murdock, [facility]  



 Dr. Velarde). No changes in management were made at the last visit. The 
patient's asthma causes daytime symptoms most days (just recently with acute 
sinusitis. has seen allergist and is on second cours    



 e of abx/prednisone.  has also been feeling ill. she has gradually 
improved. she will be done with this course of abx/pred within the next couple 
days. she was otherwise feeling well all winter.    



 retired in 2014 and she was feeling better last year after getting 
coronary artery stent. she has completed cardiac rehab. she mentions enlarged 
lymph nodes, initially noted in her neck on carotid a    



 rtery testing. aspiration was negative for malignancy per patient. she has 
established with Dr. Inez Ponce and had full ct scan and has numerous 
enlarged nodes that are being monitored). The patient    



 's asthma is not disturbing sleep. Symptoms include wheezing (occ), shortness 
of breath (a little) and productive cough (thick, white), while symptoms do not 
include chest tightness, non-productive coug    



 h or chest pain. The patient describes the difficulty breathing as dyspnea on 
exertion. The patient describes this as unchanged (except with recent infection
, improving). Symptoms are exacerbated by col    



 d temperature and activity (also allergy symptoms). Symptoms are relieved by 
inhaler use, rest and oral steroids. Associated symptoms include allergy 
symptoms, while associated symptoms do not include f    



 ever or upper respiratory infection symptoms. Current treatment includes 
inhaled albuterol, inhaled long-acting beta-2 agonists, inhaled corticosteroids 
(dulera 200/5 2 puffs bid) and leukotriene modifi    



 ers. Bronchodilator use is becoming more frequent (just with recent URI). By 
report there is good compliance with treatment, good tolerance of treatment and 
fair symptom control. Pertinent medical histo    



 ry includes other pulmonary disease (has sleep apnea, using auto titrating 
cpap now without issue. able to use all night. home care company is [employer]), 
allergic rhinitis and gastroesophageal reflux. T    



 he patient has been exposed to animal dander (1 dog), while the patient has 
not been exposed to wood burning stove, mold/mildew in the home or tobacco 
smoke.    



 Encounter Diagnosis:                                 ASTHMA (Renamed from 
AIRWAY HYPERREACTIVITY), OBSTRUCTIVE SLEEP APNEA, OBESITY (Renamed from OBESE), 
GASTROESOPHAGEAL REFLUX DISEASE, ALLERGIC RHINITIS    

 

 Office Visit  6-Oct-2014 14:18  To 6-Oct-2014 15:04  



 Encounter Reason:                                     ASTHMA, FOLLOW UP - The 
last clinic visit was 3 month(s) ago (primary is Dr. Nicole Murdock, [facility]  



 Dr. Velarde). Management changes made at the last visit include stopping 
alvesco. The patient's asthma causes daytime symptoms 1 to 2 times per week. 
The patient's asthma is not disturbing sleep. Sympt    



 oms include chest tightness (occ with exercise) and shortness of breath (a 
little, much better compared to prior visits), while symptoms do not include 
wheezing, productive cough, non-productive cough o    



 r chest pain. The patient describes the difficulty breathing as dyspnea on 
exertion. The patient describes this as improving (she did have cardiac cath in 
august with 2 stents. no MI. she is in cardiac    



 rehab. she does have aortic valve disease but not significant enough for 
intervention at this time). Symptoms are exacerbated by activity (also allergy 
symptoms). Symptoms are relieved by inhaler use, r    



 est and oral steroids. Associated symptoms include allergy symptoms, while 
associated symptoms do not include fever or upper respiratory infection 
symptoms. Current treatment includes inhaled albuterol,    



  inhaled long-acting beta-2 agonists, inhaled corticosteroids (dulera 200/5 2 
puffs bid) and leukotriene modifiers. Bronchodilator use is becoming less 
frequent. By report there is good compliance with    



 treatment, good tolerance of treatment and fair symptom control. Pertinent 
medical history includes other pulmonary disease (has sleep apnea, using auto 
titrating cpap now with better response. pressure    



 s are more comfortable), allergic rhinitis and gastroesophageal reflux.    



 Encounter Diagnosis:                                 ASTHMA (Renamed from 
AIRWAY HYPERREACTIVITY), ALLERGIC RHINITIS, GASTROESOPHAGEAL REFLUX DISEASE, 
OBSTRUCTIVE SLEEP APNEA, OBESITY (Renamed from OBESE)    

 

 Office Visit  2014 11:11  To 2014 11:51  



 Encounter Reason:                                     ASTHMA, FOLLOW UP - The 
last clinic visit was 1 month(s) ago (primary is Dr. Nicole Murdock, seeing  
[facility]  



 Dr. Velarde). Management changes made at the last visit include adding 
alvesco 2 puffs bid (has been helpful but more helpful was a diuretic change 
with improvement in edema. recent echo showed more ao    



 rtic valve stenosis and she is going to need to see cardiology-Ari. 
waiting on appt date/time). The patient's asthma causes daytime symptoms most 
days (but better). The patient's asthma is not di    



 sturbing sleep. Symptoms include shortness of breath (improved from may 2014 
visit, possible cardiology cause as well. really improved with diuretic change) 
and productive cough (clear mucous), while sy    



 mptoms do not include wheezing, chest tightness, non-productive cough or chest 
pain. The patient describes the difficulty breathing as dyspnea on exertion. 
The patient describes this as improving. Sympt    



 oms are exacerbated by activity (also allergy symptoms). Symptoms are relieved 
by inhaler use, rest and oral steroids. Associated symptoms include allergy 
symptoms, while associated symptoms do not incl    



 ude fever or upper respiratory infection symptoms. Current treatment includes 
inhaled albuterol, inhaled long-acting beta-2 agonists, inhaled corticosteroids 
(dulera 200/5 2 puffs bid) and leukotriene m    



 odifiers. Bronchodilator use is becoming less frequent. By report there is 
good compliance with treatment, good tolerance of treatment and fair symptom 
control. Pertinent medical history includes other    



 pulmonary disease (has sleep apnea, using auto titrating cpap now with better 
response. pressures are more comfortable), allergic rhinitis and 
gastroesophageal reflux.    



 Encounter Diagnosis:                                 ASTHMA (Renamed from 
AIRWAY HYPERREACTIVITY), OBSTRUCTIVE SLEEP APNEA, OBESITY (Renamed from OBESE), 
ALLERGIC RHINITIS, GASTROESOPHAGEAL REFLUX DISEASE    

 

 Annotation/Addendum  30-May-2014 12:52  To 30-May-2014 13:09  



 Encounter Diagnosis:                                 ASTHMA (Renamed from 
AIRWAY HYPERREACTIVITY)  [facility]  

 

 Office Visit  30-May-2014 11:58  To 30-May-2014 13:17  



 Encounter Reason:                                     ASTHMA, FOLLOW UP - The 
last clinic visit was 7 month(s) ago (primary is Dr. Nicole Murdock, seeing  
[facility]  



 Dr. Velarde). No changes in management were made at the last visit. The 
patient's asthma causes daytime symptoms most days. The patient's asthma is not 
disturbing sleep. Symptoms include chest tightnes    



 s, shortness of breath (increased) and productive cough (clear mucous), while 
symptoms do not include wheezing, non-productive cough or chest pain. The 
patient describes the difficulty breathing as ches    



 t tightness and dyspnea on exertion. The patient describes this as worsening (
started to have increased symptoms in march, dyspnea/wheeze/cough with dark 
mucous. was seen by allergist and given zpac and    



  5 days of oral steroids. she noticed 55% improvement but now symptoms have 
just lingered, especially chest tightness). Symptoms are exacerbated by 
activity (also allergy symptoms). Symptoms are relieve    



 d by inhaler use, rest and oral steroids. Associated symptoms include allergy 
symptoms, while associated symptoms do not include fever or upper respiratory 
infection symptoms. Current treatment includes    



  inhaled albuterol, inhaled long-acting beta-2 agonists, inhaled 
corticosteroids (dulera 200/5 2 puffs bid) and leukotriene modifiers. 
Bronchodilator use is becoming more frequent. By report there is go    



 od compliance with treatment, good tolerance of treatment and fair symptom 
control. Pertinent medical history includes other pulmonary disease (has sleep 
apnea, using auto titrating cpap now with better    



  response. pressures are more comfortable), allergic rhinitis and 
gastroesophageal reflux.    



 Encounter Diagnosis:                                 ASTHMA (Renamed from 
AIRWAY HYPERREACTIVITY), GASTROESOPHAGEAL REFLUX DISEASE, ALLERGIC RHINITIS, 
OBSTRUCTIVE SLEEP APNEA, OBESITY (Renamed from OBESE)    

 

 Office Visit  1-Oct-2013 9:32  To 1-Oct-2013 10:49  



 Encounter Reason:                                     ASTHMA, FOLLOW UP - The 
last clinic visit was 6 month(s) ago (primary is Dr. Nicole Murdock, seeing  
[facility]  



 Dr. Velarde). No changes in management were made at the last visit. The 
patient's asthma causes daytime symptoms 1 to 2 times per month (even less). 
The patient's asthma is not disturbing sleep. Sympto    



 ms include shortness of breath (occ with exertion) and non-productive cough, 
while symptoms do not include wheezing, chest tightness, productive cough or 
chest pain. The patient describes the difficulty    



  breathing as dyspnea on exertion. The patient describes this as unchanged. 
Symptoms are exacerbated by activity (also allergy symptoms). Symptoms are 
relieved by inhaler use and rest. Associated sympto    



 ms include allergy symptoms, while associated symptoms do not include fever or 
upper respiratory infection symptoms. Current treatment includes inhaled 
albuterol, inhaled long-acting beta-2 agonists, in    



 haled corticosteroids (dulera 200/5 2 puffs bid) and leukotriene modifiers. 
Bronchodilator use is staying the same. By report there is good compliance with 
treatment, good tolerance of treatment and goo    



 d symptom control. Pertinent medical history includes other pulmonary disease (
has sleep apnea, using auto titrating cpap now with better response. pressures 
are more comfortable), allergic rhinitis and gastroesophageal reflux.    



 Encounter Diagnosis:                                 ASTHMA (Renamed from 
AIRWAY HYPERREACTIVITY), OBSTRUCTIVE SLEEP APNEA, ALLERGIC RHINITIS, OBESITY (
Renamed from OBESE)    

 

 Medication Order  27-Mar-2013 16:24  To 28-Mar-2013 9:08  



 Encounter Diagnosis:                                 ASTHMA (Renamed from 
AIRWAY HYPERREACTIVITY)  [facility]  

 

 Office Visit  27-Mar-2013 14:23  To 27-Mar-2013 15:32  



 Encounter Reason:                                     ASTHMA, FOLLOW UP - The 
last clinic visit was 6 month(s) ago (primary is Dr. Nicole Murdock, seeing  
[facility]  



 Dr. Velarde). No changes in management were made at the last visit. The 
patient's asthma causes daytime symptoms 1 to 2 times per month (even less). 
The patient's asthma is not disturbing sleep. Sympto    



 ms include shortness of breath (occ with exertion) and non-productive cough, 
while symptoms do not include wheezing, chest tightness, productive cough or 
chest pain. The patient describes the difficulty    



  breathing as dyspnea on exertion. The patient describes this as improving. 
Symptoms are exacerbated by activity (also allergy symptoms). Symptoms are 
relieved by inhaler use and rest. Associated sympto    



 ms do not include fever, upper respiratory infection symptoms or allergy 
symptoms. Current treatment includes inhaled albuterol, inhaled long-acting beta
-2 agonists, inhaled corticosteroids (dulera 200/    



 5 2 puffs bid) and leukotriene modifiers. Bronchodilator use is staying the 
same. By report there is good compliance with treatment, good tolerance of 
treatment and good symptom control. Pertinent medic    



 al history includes other pulmonary disease (has sleep apnea, using cpap every 
night. home care company is [employer] in [place]. a letter was reviewed 
from the home care company that a recent down    



 load showed an AHI of just over 6. patient did have a mask change and is using 
cpap regularly. still with occ problems with leak/comfort), allergic rhinitis 
and gastroesophageal reflux.    



 Encounter Diagnosis:                                 ASTHMA (493.90), GERD (
GASTROESOPHAGEAL REFLUX DISEASE) (530.81), ALLERGIC RHINITIS, SYLVIA (OBSTRUCTIVE 
SLEEP APNEA) (327.23), OBESITY (Renamed from OBESE)    

 

 Office Visit  27-Sep-2012 12:19  To 27-Sep-2012 13:47  



 Encounter Reason:                                     ASTHMA, FOLLOW UP - The 
last clinic visit was 6 month(s) ago (primary is Dr. Nicole Murdock, [facility]  



 Dr. Velarde). No changes in management were made at the last visit. The 
patient's asthma causes daytime symptoms 1 to 2 times per week. The patient's 
asthma is not disturbing sleep. Symptoms include wh    



 eezing (occ), shortness of breath (occ with exertion, still better with the 
dulera) and productive cough (occ), while symptoms do not include chest 
tightness, non-productive cough or chest pain. The pat    



 ient describes the difficulty breathing as dyspnea on exertion. The patient 
describes this as unchanged. Symptoms are exacerbated by activity (also allergy 
symptoms). Symptoms are relieved by inhaler us    



 e and rest. Associated symptoms include allergy symptoms, while associated 
symptoms do not include fever or upper respiratory infection symptoms. Current 
treatment includes inhaled albuterol, inhaled lo    



 ng-acting beta-2 agonists, inhaled corticosteroids (dulera 200/5 2 puffs bid) 
and leukotriene modifiers. Bronchodilator use is staying the same. By report 
there is good compliance with treatment, good t    



 olerance of treatment and good symptom control. Pertinent medical history 
includes other pulmonary disease (has sleep apnea, using cpap every night), 
allergic rhinitis and gastroesophageal reflux.    



 Encounter Diagnosis:                                 ASTHMA (493.90), SYLVIA (
OBSTRUCTIVE SLEEP APNEA) (327.23), OBESITY (278.00), ALLERGIC RHINITIS (477.9), 
GERD (GASTROESOPHAGEAL REFLUX DISEASE) (530.81)    

 

 Medication Order  10-Sep-2012 16:43  To 10-Sep-2012 16:56  



 Encounter Diagnosis:                                 SYLVIA (OBSTRUCTIVE SLEEP 
APNEA) (327.23)  [facility]  

 

 Historical Summary  2012 13:14  To 2012 15:31  



 Encounter Diagnosis:                                 ALLERGIC RHINITIS (477.9)
  [facility]  

 

 Office Visit  27-Mar-2012 12:27  To 27-Mar-2012 13:13  



 Encounter Reason:                                     Asthma Follow-up - The 
last office visit was 6 month(s) ago. No changes in management were made at [facility]  



 e last visit. Symptoms do not include wheezing, chest tightness, shortness of 
breath, productive cough or non-productive cough. The patient describes this as 
improving (with weight loss). Symptoms are e    



 xacerbated by activity. Symptoms are relieved by rest. Associated symptoms do 
not include fever or upper respiratory infection symptoms. Current treatment 
includes inhaled albuterol, inhaled long-acting    



  beta-2 agonists, inhaled corticosteroids (dulera 200/5 mcg 2 puffs bid) and 
leukotriene modifiers. Bronchodilator use is becoming less frequent (hasn't 
needed since last visit). Denies recent hospitalization,    



  [ADDITIONAL REASON] Sleep follow up - The sleep disorder is characterized as 
obstructive sleep apnea. The last office visit was 6 month(s) ago. No changes 
in management were made at the last visit. The    



  patient gets approximately 8 hours of sleep per night. Current treatment 
includes CPAP therapy (with 20 lbs weight loss from last visit cpap pressure is 
too much, blowing mask off face. hasn't been abl    



 e to wear in 2 months). By report there is good compliance with treatment (
except with recent pressure issue). Current symptoms include unrefreshing 
sleep. The patient describes this as worsening (d/t inability to wear).    



 Encounter Diagnosis:                                 ASTHMA (493.90), SYLVIA (
OBSTRUCTIVE SLEEP APNEA) (327.23), GERD (GASTROESOPHAGEAL REFLUX DISEASE) (
530.81), ALLERGIC RHINITIS (477.9), OBESITY (278.00)    

 

 Historical Summary  27-Sep-2011 15:18  To 27-Sep-2011 15:20  



   [facility]  Office  

 

 Office Visit  27-Sep-2011 13:05  To 27-Sep-2011 14:21  



 Encounter Reason:                                     Asthma Follow Up PHP - 
Symptoms include wheezing (much less), cough and mucous, while symptoms do not  
[facility]  



  include dyspnea, rescue inhaler, chest tightness or infections. The last 
clinic visit was 2 month(s) ago. Management changes made at the last visit 
include adding astepro and clarinex and stopping naso    



 nex. Current treatment includes inhaled short-acting beta-2 agonists, inhaled 
long-acting beta-2 agonists and inhaled corticosteroids. Pertinent medical 
history includes allergic rhinitis and gastroesop    



 hageal reflux. The patient describes this as improving (improved from changes 
made at last office visit, allergies are better). By report there is good 
compliance with treatment. The patient is currentl    



 y able to do activities of daily living without limitations, able to work 
without limitations and able to do housework without limitations.  Hasn't been 
hospitalized.  Hasn't needed rescue inhaler.,    



  [ADDITIONAL REASON] Sleep Follow Up PHP - The sleep disorder is characterized 
as obstructive sleep apnea. The last clinic visit was 2 month(s) ago. No 
changes in management were made at the last visit.    



  The patient gets approximately 6 hours of sleep per night. Current treatment 
includes CPAP therapy. By report there is good compliance with treatment. 
Current symptoms do not include excessive daytime    



 sleepiness, witnessed gasping during sleep, nocturnal choking, snoring or 
weight gain.  Not falling sleep driving.  Vendor is [email or web address] and she is going to be 
getting new supplies this week.  No issues with C    



 PAP tolerance, but some discomfort at bridge of nose.  May change mask.  Has 
humidity on machine.    



 Encounter Diagnosis:                                 SYLVIA (OBSTRUCTIVE SLEEP 
APNEA) (327.23), ASTHMA (493.90), OBESITY (278.00), ALLERGIC RHINITIS (477.9), 
GERD (GASTROESOPHAGEAL REFLUX DISEASE) (530.81)    

 

 Historical Summary  27-Sep-2011 10:04  To 27-Sep-2011 11:17  



   [facility]  

 

 Historical Summary  13-Sep-2011 17:39  To 13-Sep-2011 17:43  



   [facility]  [facility]  







Payers







 Medicare [facility]    



 [address] [facility] [address] [place]    Group Number: [id number]



 tel:[phone]    

 

 [employer]    



 [address] [address] [facility] [address] [place]    Group Number: [id number]



 tel:[phone]    









 [employer]



 [address] [facility] [address] [facility] [address] 



 tel:[phone]

## 2020-02-27 NOTE — XMS REPORT
Continuity of Care Document (CCD)

 Created on:2020



Patient:Teri Heaton

Sex:Female

:1950

External Reference #:[id number]





Demographics







 Address  [address] [place] [address] [address]



   [place] [address]

 

 Home Phone  [phone]

 

 Mobile Phone  [phone]

 

 Phone  [phone]

 

 Email Address  [email or web address]

 

 Preferred Language  en

 

 Marital Status  Not  or 

 

 Anglican Affiliation  Unknown

 

 Race  White

 

 Ethnic Group  Not  or 









Author







 Name  FRANKLIN Mariscal (transmitted by agent of provider Nayana Velarde)

 

 Address  [address] [place] [address]









Care Team Providers







 Name  Role  Phone

 

 Nicole Murdock MD  Care Team Information   [phone]









Problems







 Active Problems  Provider  Date

 

 Allergic asthma without status asthmaticus  LAW White  Onset: 

 

 Allergic rhinitis  LAW White  Onset: 2014

 

 Allergic rhinitis due to pollen  LAW White  Onset: 2014

 

 Acute maxillary sinusitis  LAW White  Onset: 2014

 

 Uncomplicated moderate persistent asthma  August Castillo M.D.  Onset: 2017

 

 Cough  August Castillo M.D.  Onset: 10/11/2018







Social History







 Type  Date  Description  Comments

 

 Birth Sex    Unknown  

 

 ETOH Use    Currently consumes  1 glass of wine per



     alcohol  day

 

 Tobacco Use  Start: Unknown End:  Patient is a former  quit 26 years ago;



   Unknown  smoker  h/o tobacco x approx



       25 years (3ppd at



       time of quitting).

 

 Recreational Drug Use    Denies Drug Use  

 

 Smoking Status  Reviewed: 18  Patient is a former  quit 26 years ago;



     smoker  h/o tobacco x approx



       25 years (3ppd at



       time of quitting).







Allergies, Adverse Reactions, Alerts







 Active Allergies  Reaction  Severity  Comments  Date

 

 Feldene      blisters  2008

 

 Penicillin  Urticaria      2008







Medications







 Active Medications  SIG  Qnty  Indications  Ordering  Date



         Provider  

 

 Ipratropium Bromide  use with nebulizer  75ml    Vanita  2020



   am&pm      Uldrich, FNP-C  



 0.02% Solution          



           

 

 Azelastine HCL  Spray One Spray  30units    Abigail Dodsonmond,  2019



 (Nasal)  Into Each Nostril      FNP-C  



       137mcg/Spray  Once In The Morning        



 Solution  And Once AT Night        



           

 

 Levalbuterol HCL  use via nebulizer  30ml    Vanita  2019



   every 4 -6 hours      Uldrich, FNP-C  



 1.25mg/3ML Nebulizer  for shortness of        



   breath, cough or        



   wheezing        

 

 Sodium Chloride  Mix 1 with 1 vial  90ml    Vanita  2019



               0.9%  of levalbuterol      Uldrich, FNP-C  



 Nebulizer  solutions vial via        



   nebulizer as needed        

 

 Epipen 2-Dl  use as directed  2units    Vanita  2019



         Uldrich, FNP-C  



 0.3mg/0.3ML Solution          



 Auto-Inject          



           

 

 Xolair  inject 225mg under  12units    Vanita  2018



      150mg Solution  the skin every 2      Uldrich, FNP-C  



 Rec  weeks        

 

 Cetirizine HCL  1 by mouth every  30tabs    Vaishali Andino,  2018



              10mg  day      FNP-C  



 Tablets          



           

 

 Ventolin HFA  2 inhalations q4h  1units  J45.40  August Castillo,  2015



   prn coughing or      M.D.  



 108(90Base) mcg/Act  wheezing        



 Aerosol          



           

 

 Albuterol Sulfate  #1 via nebulizer  90ml  J45.40  Vanita  2015



   every 4-6 hours as      Uldrich, FNP-C  



 (2.5mg/3ML) 0.083%  needed for cough,        



 Nebulizer  shortness of breath        



   and wheezing        

 

 Fluticasone  two sprays in each  1units    August Castillo,  2013



 Propionate  nostril once daily      M.D.  



          50mcg/Act          



 Suspension          



           

 

 Dulera  inhale 2 puffs by  13gm    Vanita  



      100-5mcg/Act  mouth every morning      Uldrich, FNP-C  



 Aerosol  and evening        



           

 

 Spiriva Respimat  2 inhalations once  1units  J45.40  Vanita  



   daily      Uldrich, FNP-C  



 2.5mcg/Act Aerosol          



           

 

 Furosemide  2 tabs twice per      Unknown  



          40mg Tablets  day        



           

 

 Spironolactone  1/2 tablet daily      Unknown  



              25mg          



 Tablets          



           

 

 Pantoprazole Sodium  1 tab daily.      Unknown  



           



 40mg Tablets DR Hester M10  1 tab every day      Unknown  



            10Meq          



 Tablets ER          



           

 

 Irbesartan  1 tab every day      Unknown  



          75mg Tablets          



           

 

 Aspirin Low Dose  1 every day      Unknown  



                81mg          



 Chewtabs          



           

 

 Atorvastatin Calcium  1 every day      Unknown  



           



 Powder          



           

 

 Allopurinol  twice a day      Unknown  



           100mg          



 Tablets          



           

 

 Singulair  take 1 tablet by  30tabs    Vanita  



         10mg Tablets  mouth every day      FRANKLIN Alcazar  



   needs annual        

 

 Levothyroxine Sodium  1 tab daily.      Unknown  



           



 125mcg Tablets          



           







Medications Administered in Office







 Medication  SIG  Qnty  Indications  Ordering Provider  Date

 

 Biologic Agent        MARIAN Mariscal-C  2020



 Administration          



        Injection          



           

 

 Biologic Agent        MARIAN Mariscal-C  2019



 Administration          



        Injection          



           

 

 Therapeutic, Prophylactic Or        Vanita Inge FNP-C  2019



 Diagnostic Injection Subq/Im          



           



 Injection          

 

 Biologic Agent        LYDIA MariscalP-C  2019



 Administration          



        Injection          



           

 

 Therapeutic, Prophylactic Or        Vanita Ulbasia FNP-C  2019



 Diagnostic Injection Subq/Im          



           



 Injection          

 

 Biologic Agent        Vanita Alcazar FNP-C  2019



 Administration          



        Injection          



           

 

 Therapeutic, Prophylactic Or        Vanita Inge FNP-C  2019



 Diagnostic Injection Subq/Im          



           



 Injection          

 

 Biologic Agent        Vanita Alcazar, FNP-C  2019



 Administration          



        Injection          



           

 

 Therapeutic, Prophylactic Or        Vanita Ulbasia FNP-C  2019



 Diagnostic Injection Subq/Im          



           



 Injection          

 

 Biologic Agent        Vanita Alcazar, FNP-C  10/23/2019



 Administration          



        Injection          



           

 

 Therapeutic, Prophylactic Or        Vanita Ulbasia, FNP-C  10/23/2019



 Diagnostic Injection Subq/Im          



           



 Injection          

 

 Biologic Agent        Vanita Alcazar, FNP-C  10/09/2019



 Administration          



        Injection          



           

 

 Therapeutic, Prophylactic Or        Vanita Uldrich, FNP-C  10/09/2019



 Diagnostic Injection Subq/Im          



           



 Injection          

 

 Therapeutic, Prophylactic Or        Vanita Uldrich, FNP-C  2019



 Diagnostic Injection Subq/Im          



           



 Injection          

 

 Biologic Agent        Vanita Uldrich, FNP-C  2019



 Administration          



        Injection          



           

 

 Therapeutic, Prophylactic Or        Vanita Uldrich, FNP-C  2019



 Diagnostic Injection Subq/Im          



           



 Injection          

 

 Biologic Agent        Vanita Uldrich, FNP-C  2019



 Administration          



        Injection          



           

 

 Therapeutic, Prophylactic Or        Vanita Uldrich, FNP-C  2019



 Diagnostic Injection Subq/Im          



           



 Injection          

 

 Biologic Agent        Vanita Uldrich, FNP-C  2019



 Administration          



        Injection          



           

 

 Therapeutic, Prophylactic Or        Vanita Uldrich, FNP-C  2019



 Diagnostic Injection Subq/Im          



           



 Injection          

 

 Biologic Agent        Vanita Uldrich, FNP-C  2019



 Administration          



        Injection          



           

 

 Therapeutic, Prophylactic Or        Vanita Uldrich, FNP-C  2019



 Diagnostic Injection Subq/Im          



           



 Injection          

 

 Biologic Agent        Vanita Uldrich, FNP-C  2019



 Administration          



        Injection          



           

 

 Therapeutic, Prophylactic Or        Vanita Uldrich, FNP-C  2019



 Diagnostic Injection Subq/Im          



           



 Injection          

 

 Therapeutic, Prophylactic Or        Vanita Uldrich, FNP-C  2019



 Diagnostic Injection Subq/Im          



           



 Injection          

 

 Biologic Agent        Vanita Uldrich, FNP-C  2019



 Administration          



        Injection          



           

 

 Therapeutic, Prophylactic Or        Nayana Velarde M.D.  2019



 Diagnostic Injection Subq/Im          



           



 Injection          

 

 Biologic Agent        Vanita Uldrich, FNP-C  2019



 Administration          



        Injection          



           

 

 Therapeutic, Prophylactic Or        Vanita Uldrich, FNP-C  2019



 Diagnostic Injection Subq/Im          



           



 Injection          

 

 Biologic Agent        Vanita Uldrich, FNP-C  2019



 Administration          



        Injection          



           

 

 Therapeutic, Prophylactic Or        Vanita Uldrich, FNP-C  2019



 Diagnostic Injection Subq/Im          



           



 Injection          

 

 Biologic Agent        Vanita Uldrich, FNP-C  2019



 Administration          



        Injection          



           

 

 Therapeutic, Prophylactic Or        Vanita Uldrich, FNP-C  2019



 Diagnostic Injection Subq/Im          



           



 Injection          

 

 Biologic Agent        Vanita Uldrich, FNP-C  04/10/2019



 Administration          



        Injection          



           

 

 Therapeutic, Prophylactic Or        Vanita Uldrich, FNP-C  04/10/2019



 Diagnostic Injection Subq/Im          



           



 Injection          

 

 Biologic Agent        Vanita Uldrich, FNP-C  2019



 Administration          



        Injection          



           

 

 Therapeutic, Prophylactic Or        Vanita Uldrich, FNP-C  2019



 Diagnostic Injection Subq/Im          



           



 Injection          

 

 Biologic Agent        Kavita Raul, FNP-C  2019



 Administration          



        Injection          



           

 

 Therapeutic, Prophylactic Or        Kavita Raul, FNP-C  2019



 Diagnostic Injection Subq/Im          



           



 Injection          

 

 Biologic Agent        Vanita Uldrich, FNP-C  2019



 Administration          



        Injection          



           

 

 Therapeutic, Prophylactic Or        Vanita Uldrich, FNP-C  2019



 Diagnostic Injection Subq/Im          



           



 Injection          

 

 Biologic Agent        Vanita Uldrich, FNP-C  2019



 Administration          



        Injection          



           

 

 Therapeutic, Prophylactic Or        August Castillo M.D.  2019



 Diagnostic Injection Subq/Im          



           



 Injection          

 

 Biologic Agent        Vanita Uldrich, FNP-C  2019



 Administration          



        Injection          



           

 

 Therapeutic, Prophylactic Or        aNyana Velarde M.D.  2019



 Diagnostic Injection Subq/Im          



           



 Injection          

 

 Biologic Agent        Vanita Uldrich, FNP-C  2019



 Administration          



        Injection          



           

 

 Therapeutic, Prophylactic Or        Vanita Uldrich, FNP-C  2019



 Diagnostic Injection Subq/Im          



           



 Injection          

 

 Celestone/Cortisone        Tere Alvarado,  2015



 [id number] 1 cc        PH.D, RPA-C  



          Injection          



           

 

 Celestone/Cortisone        Tere Alvarado,  2015



 [id number] 1 cc        PH.D, RPA-C  



          Injection          



           

 

 Injection        Elliot Rubinstein, M.D.  2013



   Injection          

 

 Injection        Allergy Injection  2013



   Injection          

 

 Injection        Allergy Injection  2013



   Injection          

 

 Injection        Allergy Injection  2013



   Injection          

 

 Injection        Elliot Rubinstein, M.D.  2013



   Injection          

 

 Injection        Allergy Injection  2013



   Injection          

 

 Injection        Elliot Rubinstein, M.D.  2013



   Injection          

 

 Injection        Allergy Injection  2013



   Injection          

 

 Injection        Elliot Rubinstein, M.DLiz  2013



   Injection          

 

 Injection        Elliot Rubinstein, GISELADLiz  2013



   Injection          

 

 Injection        Elliot Rubinstein, GISELADLiz  2013



   Injection          

 

 Injection        Elliot Rubinstein, GISELADLiz  2013



   Injection          

 

 Injection        Nayana HUNT GISELA VelardeDLiz  2012



   Injection          

 

 Injection        Nayana HUNT Prachi, GISELADLiz  2012



   Injection          

 

 Injection        Nayana HUNT Prachi, GILBERT.DLiz  2012



   Injection          

 

 Injection        Nayana HUNT Abdoulalice, GILBERT.DLiz  10/23/2012



   Injection          

 

 Injection        Nayana HUNT Prachi, GISELADLiz  10/02/2012



   Injection          

 

 Injection        Nayana HUNT Prachi, GISELADLiz  2012



   Injection          

 

 Injection        Nayana HUNT GISELA VelardeDLiz  2012



   Injection          

 

 Injection        Nayana HUNT GISELA VelardeDLiz  2012



   Injection          

 

 Injection        Nayana M Prachi, GISELADLiz  2012



   Injection          

 

 Injection        Nayana HUNT Prachi, GISELADLiz  2012



   Injection          

 

 Injection        Nayana HUNT GISELA VelardeDLiz  07/10/2012



   Injection          

 

 Injection        Nayana HUNT GISELA VelardeDLiz  2012



   Injection          

 

 Injection        Nayana HUNT Prachi, GISELADLiz  2012



   Injection          

 

 Injection        Nayana HUNT Prachi, GISELADLiz  2012



   Injection          

 

 Injection        Nayana HUNT GISELA VelardeDLiz  05/15/2012



   Injection          

 

 Injection        Nayana M GISELA VelardeDLiz  2012



   Injection          

 

 Injection        Nayana HUNT GISELA VelardeDLiz  2012



   Injection          

 

 Injection        Nayana HUNT Prachi, GISELADLiz  04/10/2012



   Injection          

 

 Injection        Nayana HUNT Prachi, GISELADLiz  2012



   Injection          

 

 Injection        Nayana M GISELA VelardeDLiz  2012



   Injection          

 

 Injection        Elliot Rubinstein, GISELADLiz  2012



   Injection          

 

 Injection        Elliot Rubinstein, GISELADLiz  2012



   Injection          

 

 Injection        GISELA TaoDLiz  12/15/2011



   Injection          

 

 Injection        Elliot Rubinstein, GISELADLiz  2011



   Injection          

 

 Injection        Elliot Rubinstein, M.DLiz  2011



   Injection          

 

 Injection        Robbi Rubinstein, M.DLiz  2011



   Injection          

 

 Injection        Robbi Rubinstein, M.DLiz  10/13/2011



   Injection          

 

 Injection        Robbi Rubinstein, M.DLiz  2011



   Injection          

 

 Injection        Robbi Rubinstein, M.DLiz  2011



   Injection          

 

 Injection        Robbi Rubinstein, M.DLiz  2011



   Injection          

 

 Injection        Robbi Rubinstein, M.DLiz  2011



   Injection          

 

 Injection        Robbi Rubinstein, M.DLiz  2011



   Injection          

 

 Injection        Robbi Rubinstein, M.DLiz  2011



   Injection          

 

 Injection        Robbi Rubinstein, M.DLiz  2011



   Injection          

 

 Injection        Robbi Rubinstein, M.DLiz  2011



   Injection          

 

 Injection        Robbi Rubinstein, M.DLiz  2011



   Injection          

 

 Injection        Robbi Rubinstein, M.DLiz  2011



   Injection          

 

 Injection        Robbi Rubinstein, GILBERT.DLiz  2011



   Injection          

 

 Injection        Robbi Rubinstein, GILBERT.DLiz  2011



   Injection          

 

 Injection        Robbi Rubinstein, M.DLiz  2011



   Injection          

 

 Injection        Robbi Rubinstein, M.DLiz  2011



   Injection          

 

 Injection        Robbi Rubinstein, M.DLiz  2011



   Injection          

 

 Injection        Robbi Rubinstein, M.DLiz  2010



   Injection          

 

 Injection        Robbi Rubinstein, M.DLiz  2010



   Injection          

 

 Injection        Robbi Rubinstein, M.DLiz  10/12/2010



   Injection          

 

 Injection        Robbi Rubinstein, M.DLiz  2010



   Injection          

 

 Injection        Robbi Rubinstein, M.DLiz  2010



   Injection          

 

 Injection        Robbi Rubinstein, M.DLiz  2010



   Injection          

 

 Injection        Robbi Rubinstein, M.DLiz  08/10/2010



   Injection          

 

 Injection        Nayana Velarde M.D.  2010



   Injection          

 

 Injection        Robbi Rubinstein, MLizDLiz  2010



   Injection          

 

 Injection        Robbi Rubinstein, M.DLiz  2010



   Injection          

 

 Injection        Robbi Rubinstein, MLizDLiz  06/15/2010



   Injection          

 

 Injection        Robbi Rubinstein, MLizDLiz  2010



   Injection          

 

 Injection        Robbi Rubinstein, GISELADLiz  2010



   Injection          

 

 Injection        Robbi Rubinstein, M.DLiz  2010



   Injection          

 

 Injection        Robbi Rubinstein, M.DLiz  2010



   Injection          

 

 Injection        Robbi Rubinstein, M.DLiz  2010



   Injection          

 

 Injection        Robbi Rubinstein, M.DLiz  2010



   Injection          

 

 Injection        Robbi Rubinstein, GISELADLiz  2010



   Injection          

 

 Injection        Robbi Rubinstein, M.DLiz  2010



   Injection          

 

 Injection        Nayana Velarde M.D.  2010



   Injection          

 

 Injection        Curt Sánchez,  2010



   Injection        M.DLiz  

 

 Injection        Robbi Rubinstein, MLizDLiz  2010



   Injection          

 

 Injection        Robbi Rubinstein, M.DLiz  2009



   Injection          

 

 Injection        Curt Sánchez,  12/10/2009



   Injection        M.DiLz  

 

 Injection        Robbi Rubinstein, M.D.  2009



   Injection          

 

 Injection        Robbi Rubinstein, GISELADLiz  11/10/2009



   Injection          

 

 Injection        Robbi Rubinstein, M.DLiz  10/27/2009



   Injection          

 

 Injection        Robbi Rubinstein, M.DLiz  10/08/2009



   Injection          

 

 Injection        Robbi Rubinstein, MLizDLiz  2009



   Injection          

 

 Injection        Robbi Rubinstein, MLizDLiz  09/10/2009



   Injection          

 

 Injection        Robbi Rubinstein, M.DLiz  2009



   Injection          

 

 Injection        Robbi Rubinstein, GISELADLiz  2009



   Injection          

 

 Injection        Robbi Rubinstein, MLizDLiz  2009



   Injection          

 

 Injection        Robbi Rubinstein, M.DLiz  2009



   Injection          

 

 Injection        Robbi Rubinstein, M.DLiz  2009



   Injection          

 

 Injection        Curt Sánchez,  2009



   Injection        M.D.  

 

 Injection        Robbi Rubinstein, M.DLiz  2009



   Injection          

 

 Injection        Robbi Rubinstein, M.DLiz  2009



   Injection          

 

 Injection        Robbi Rubinstein, MLizDLiz  2009



   Injection          

 

 Injection        Robbi Rubinstein, GISELADLiz  2009



   Injection          

 

 Injection        Robbi Rubinstein, M.DLiz  2009



   Injection          

 

 Injection        Robbi Rubinstein, M.D.  2009



   Injection          

 

 Injection        Robbi Rubinstein, GISELADLiz  2009



   Injection          

 

 Injection        Robbi Rubinstein, GISELADLiz  2009



   Injection          

 

 Injection        Robbi Rubinstein, GISELADLiz  2009



   Injection          

 

 Injection        Robbi Rubinstein, GISELADLiz  2009



   Injection          

 

 Injection        Robbi Rubinstein, GISELADLiz  2009



   Injection          

 

 Injection        Robbi Rubinstein, GISELADLiz  2009



   Injection          

 

 Injection        Robbi Rubinstein, M.DLiz  01/15/2009



   Injection          

 

 Injection        Robbi Rubinstein, GILBERT.DLiz  2008



   Injection          

 

 Injection        Robbi Rubinstein, M.D.  2008



   Injection          

 

 Injection        Robbi Rubinstein, M.D.  2008



   Injection          

 

 Injection        Robbi Rubinstein, M.D.  2008



   Injection          

 

 Injection        Robbi Rubinstein, M.D.  2008



   Injection          

 

 Injection        Robbi Rubinstein, M.D.  2008



   Injection          

 

 Injection        Robbi Rubinstein, M.D.  10/30/2008



   Injection          

 

 Injection        Robbi Rubinstein, M.D.  10/23/2008



   Injection          

 

 Injection        Robbi Rubinstein, M.D.  10/16/2008



   Injection          

 

 Injection        Robbi Rubinstein, M.D.  10/09/2008



   Injection          

 

 Injection        Robbi Rubinstein, M.D.  10/02/2008



   Injection          

 

 Injection        Robbi Rubinstein, GISELADLiz  2008



   Injection          

 

 Injection        Robbi Rubinstein, M.D.  2008



   Injection          

 

 Injection        Robbi Rubinstein, M.D.  2008



   Injection          

 

 Injection        Robbi Rubinstein, GISELADLiz  2008



   Injection          

 

 Injection        Robbi Rubinstein, GISELADLiz  2008



   Injection          

 

 Injection        Robbi Rubinstein, M.D.  2008



   Injection          

 

 Injection        Robbi Rubinstein, M.D.  2008



   Injection          

 

 Injection        Robbi Rubinstein, M.D.  2008



   Injection          

 

 Injection        Robbi Rubinstein, GISELADLiz  2008



   Injection          

 

 Injection        Robbi Rubinstein, M.D.  2008



   Injection          

 

 Injection        Robbi Rubinstein, M.D.  07/15/2008



   Injection          

 

 Injection        Elliot Rubinstein, M.D.  07/10/2008



   Injection          

 

 Injection        Elliot Rubinstein, M.D.  2008



   Injection          

 

 Injection        Elliot Rubinstein, M.D.  2008



   Injection          

 

 Injection        Elliot Rubinstein, M.D.  2008



   Injection          

 

 Injection        Elliot Rubinstein, M.D.  2008



   Injection          

 

 Injection        Elliot Rubinstein, M.D.  2008



   Injection          

 

 Injection        Elliot Rubinstein, M.D.  2008



   Injection          

 

 Injection        Elliot Rubinstein, M.D.  2008



   Injection          

 

 Injection        Elliot Rubinstein, M.D.  2008



   Injection          

 

 Injection        Elliot Rubinstein, M.D.  2008



   Injection          

 

 Injection        Elliot Rubinstein, M.D.  2008



   Injection          







Immunizations







 CPT Code  Status  Date  Vaccine  Lot #

 

 [id number]  Given  10/01/2013  Influenza Vaccine  

 

 [id number]  Given  2011  Pneumococcal Vaccine  

 

 46865  Given  Unknown  Pneumococcal Vaccine  

 

 57420  Given  Unknown  Influenza Vaccine  

 

 33978  Given  Unknown  Influenza Vaccine  

 

 [id number]  Given  Unknown  Influenza Vaccine  

 

 70067  Given  Unknown  Influenza Vaccine  

 

 [id number]  Given  Unknown  Influenza Vaccine  







Vital Signs







 Date  Vital  Result  Comment

 

 2020 11:18am  Height  65 inches  5'5"









 Weight  237.00 lb  

 

 Weight  107.503 kg  

 

 Respiratory Rate  20 /min  

 

 Heart Rate  102 /min  

 

 O2 % BldC Oximetry  95 %  

 

 BP Systolic  124 mmHg  

 

 BP Diastolic  68 mmHg  

 

 BMI (Body Mass Index)  39.4 kg/m2  









 2019 11:17am  Height  65 inches  5'5"









 Weight  240.00 lb  

 

 Weight  108.864 kg  

 

 Respiratory Rate  16 /min  

 

 Heart Rate  90 /min  

 

 O2 % BldC Oximetry  95 %  

 

 BP Systolic  138 mmHg  

 

 BP Diastolic  71 mmHg  

 

 Asthma Control Test  21  

 

 BMI (Body Mass Index)  39.9 kg/m2  







Results







 Description

 

 No Information Available







Procedures







 Date  Code  Description  Status

 

 2020  17007  Biologic Agent Administration  Completed

 

 2020  14448  Ippb  Completed

 

 2019  66926  Biologic Agent Administration  Completed

 

 2019  11181  Therapeutic, Prophylactic Or Diagnostic Injection Subq/Im  
Completed

 

 2019  19643  Biologic Agent Administration  Completed

 

 2019  50168  Therapeutic, Prophylactic Or Diagnostic Injection Subq/Im  
Completed

 

 2019  71058  Ippb  Completed

 

 2019  83301  Biologic Agent Administration  Completed

 

 2019  99530  Therapeutic, Prophylactic Or Diagnostic Injection Subq/Im  
Completed

 

 2019  98147  Biologic Agent Administration  Completed

 

 2019  36644  Therapeutic, Prophylactic Or Diagnostic Injection Subq/Im  
Completed

 

 10/23/2019  10465  Therapeutic, Prophylactic Or Diagnostic Injection Subq/Im  
Completed

 

 10/23/2019  45497  Biologic Agent Administration  Completed

 

 10/09/2019  81440  Biologic Agent Administration  Completed

 

 10/09/2019  29736  Therapeutic, Prophylactic Or Diagnostic Injection Subq/Im  
Completed

 

 2019  92427  Therapeutic, Prophylactic Or Diagnostic Injection Subq/Im  
Completed

 

 2019  17807  Biologic Agent Administration  Completed

 

 2019  40618  Therapeutic, Prophylactic Or Diagnostic Injection Subq/Im  
Completed

 

 2019  52071  Biologic Agent Administration  Completed

 

 2019  92791  Therapeutic, Prophylactic Or Diagnostic Injection Subq/Im  
Completed

 

 2019  75200  Biologic Agent Administration  Completed

 

 2019  46990  Therapeutic, Prophylactic Or Diagnostic Injection Subq/Im  
Completed

 

 2019  11192  Biologic Agent Administration  Completed

 

 2019  00976  Therapeutic, Prophylactic Or Diagnostic Injection Subq/Im  
Completed

 

 2019  13397  Nitric Oxide  Gas Determination  Completed







Medical Devices







 Description

 

 No Information Available







Encounters







 Type  Date  Location  Provider  Dx  Diagnosis

 

 Office Visit  2019  [facility]  Vanita Alcazar  J45.50  Severe 
persistent



   11:20a    P-RAMOS    asthma, uncomplicated









 J30.1  Allergic rhinitis due to pollen

 

 J45.50  Severe persistent asthma, uncomplicated

 

 J30.2  Other seasonal allergic rhinitis

 

 J30.81  Allergic rhinitis due to animal (cat) (dog) hair and dander

 

 J30.1  Allergic rhinitis due to pollen

 

 J30.2  Other seasonal allergic rhinitis

 

 J30.81  Allergic rhinitis due to animal (cat) (dog) hair and dander

 

 J30.89  Other allergic rhinitis







Assessments







 Date  Code  Description  Provider

 

 2020  J45.50  Severe persistent asthma, uncomplicated  FRANKLIN Mariscal

 

 2020  J30.2  Other seasonal allergic rhinitis  FRANKLIN Mariscal

 

 2020  J30.81  Allergic rhinitis due to animal (cat) (dog)  Vanita 
Uldrich, FNP-C



     hair and dander  

 

 2020  J30.89  Other allergic rhinitis  Vanita Uldrich, FNP-C

 

 2020  J30.1  Allergic rhinitis due to pollen  Vanita Uldrich, FNP-C

 

 2019  J45.50  Severe persistent asthma, uncomplicated  Vanita Uldrich, 
FNP-C

 

 2019  J30.2  Other seasonal allergic rhinitis  Nayana Velarde M.D.

 

 2019  J30.2  Other seasonal allergic rhinitis  Vanita Uldrich, FNP-C

 

 2019  J30.81  Allergic rhinitis due to animal (cat) (dog)  Nayana Velarde M.D.



     hair and dander  

 

 2019  J30.81  Allergic rhinitis due to animal (cat) (dog)  Vanita 
Uldrich, FNP-C



     hair and dander  

 

 2019  J30.89  Other allergic rhinitis  Nayana Velarde M.D.

 

 2019  J30.89  Other allergic rhinitis  Vanita Uldrich, FNP-C

 

 2019  J30.1  Allergic rhinitis due to pollen  Nayana Velarde M.D.

 

 2019  J30.1  Allergic rhinitis due to pollen  Vanita Uldrich, FNP-C

 

 2019  J45.50  Severe persistent asthma, uncomplicated  Vanita Uldrich, 
FNP-C

 

 2019  J45.50  Severe persistent asthma, uncomplicated  Nayana Velarde M.D.

 

 2019  J30.2  Other seasonal allergic rhinitis  Vanita Uldrich, FNP-C

 

 2019  J45.50  Severe persistent asthma, uncomplicated  Vanita Uldrich, 
FNP-C

 

 2019  J30.81  Allergic rhinitis due to animal (cat) (dog)  Vanita 
Uldrich, FNP-C



     hair and dander  

 

 2019  J30.2  Other seasonal allergic rhinitis  Nayana Velarde M.D.

 

 2019  J30.89  Other allergic rhinitis  Vanita Uldrich, FNP-C

 

 2019  J30.2  Other seasonal allergic rhinitis  Vanita Uldrich, FNP-C

 

 2019  J30.81  Allergic rhinitis due to animal (cat) (dog)  Nayana Velarde M.D.



     hair and dander  

 

 2019  J30.81  Allergic rhinitis due to animal (cat) (dog)  Vanita 
Uldrich, FNP-C



     hair and dander  

 

 2019  J30.89  Other allergic rhinitis  Nayana Velarde M.D.

 

 2019  J30.89  Other allergic rhinitis  Vanita Uldrich, FNP-C

 

 2019  J30.1  Allergic rhinitis due to pollen  Vanita Uldrich, FNP-C

 

 2019  J45.50  Severe persistent asthma, uncomplicated  Vanita Uldrich, 
FNP-C

 

 2019  J45.50  Severe persistent asthma, uncomplicated  Nayana Velarde M.D.

 

 2019  J45.50  Severe persistent asthma, uncomplicated  Vanita Uldrich, 
FNP-C

 

 2019  J30.2  Other seasonal allergic rhinitis  Nayana Velarde M.D.

 

 2019  J30.2  Other seasonal allergic rhinitis  Vanita Uldrich, FNP-C

 

 2019  J30.81  Allergic rhinitis due to animal (cat) (dog)  Nayana Velarde M.D.



     hair and dander  

 

 2019  J30.81  Allergic rhinitis due to animal (cat) (dog)  Vanita 
Uldrich, FNP-C



     hair and dander  

 

 2019  J30.89  Other allergic rhinitis  Nayana Velarde M.D.

 

 2019  J30.89  Other allergic rhinitis  Vanita Uldrich, FNP-C

 

 2019  J30.1  Allergic rhinitis due to pollen  Vanita Uldrich, FNP-C

 

 2019  J45.50  Severe persistent asthma, uncomplicated  Vanita Uldrich, 
FNP-C

 

 2019  J45.50  Severe persistent asthma, uncomplicated  Nayana Velarde M.D.

 

 2019  J45.50  Severe persistent asthma, uncomplicated  Vanita Uldrich, 
FNP-C

 

 2019  J30.2  Other seasonal allergic rhinitis  Nayana Velarde M.D.

 

 2019  J30.2  Other seasonal allergic rhinitis  Vanita Uldrich, FNP-C

 

 2019  J30.81  Allergic rhinitis due to animal (cat) (dog)  Nayana Velarde M.D.



     hair and dander  

 

 2019  J30.81  Allergic rhinitis due to animal (cat) (dog)  Vanita 
Uldrich, FNP-C



     hair and dander  

 

 2019  J30.89  Other allergic rhinitis  Nayana Velarde M.D.

 

 2019  J30.89  Other allergic rhinitis  Vanita Uldrich, FNP-C

 

 2019  J30.1  Allergic rhinitis due to pollen  Vanita Uldrich, FNP-C

 

 10/23/2019  J45.50  Severe persistent asthma, uncomplicated  Vanita Uldrich, 
FNP-C

 

 10/23/2019  J30.2  Other seasonal allergic rhinitis  Vanita Uldrich, FNP-C

 

 10/23/2019  J30.81  Allergic rhinitis due to animal (cat) (dog)  Vanita 
Uldrich, FNP-C



     hair and dander  

 

 10/23/2019  J30.89  Other allergic rhinitis  Vanita Uldrich, FNP-C

 

 10/23/2019  J30.1  Allergic rhinitis due to pollen  Vanita Uldrich, FNP-C

 

 10/09/2019  J45.50  Severe persistent asthma, uncomplicated  Vanita Uldrich, 
FNP-C

 

 10/09/2019  J45.50  Severe persistent asthma, uncomplicated  Nayana Velarde M.D.

 

 10/09/2019  J45.50  Severe persistent asthma, uncomplicated  Vanita Uldrich, 
FNP-C

 

 10/09/2019  J30.2  Other seasonal allergic rhinitis  Nayana Velarde M.D.

 

 10/09/2019  J30.2  Other seasonal allergic rhinitis  Vanita Uldrich, FNP-C

 

 10/09/2019  J30.81  Allergic rhinitis due to animal (cat) (dog)  Nayana Velarde M.D.



     hair and dander  

 

 10/09/2019  J30.81  Allergic rhinitis due to animal (cat) (dog)  Vanita 
Uldrich, FNP-C



     hair and dander  

 

 10/09/2019  J30.89  Other allergic rhinitis  Nayana Velarde M.D.

 

 10/09/2019  J30.89  Other allergic rhinitis  Vanita Uldrich, FNP-C

 

 2019  J45.50  Severe persistent asthma, uncomplicated  Vanita Uldrich, 
FNP-C

 

 2019  J45.50  Severe persistent asthma, uncomplicated  Nayana Velarde M.D.

 

 2019  J45.50  Severe persistent asthma, uncomplicated  Vanita Uldrich, 
FNP-C

 

 2019  J30.1  Allergic rhinitis due to pollen  Nayana Velarde M.D.

 

 2019  J30.1  Allergic rhinitis due to pollen  Vanita Uldrich, FNP-C

 

 2019  J30.2  Other seasonal allergic rhinitis  Nayana Velarde M.D.

 

 2019  J30.2  Other seasonal allergic rhinitis  Vanita Uldrich, FNP-C

 

 2019  J30.81  Allergic rhinitis due to animal (cat) (dog)  Nayana Velarde M.D.



     hair and dander  

 

 2019  J30.81  Allergic rhinitis due to animal (cat) (dog)  Vanita 
Uldrich, FNP-C



     hair and dander  

 

 2019  J30.89  Other allergic rhinitis  Vanita Uldrich, FNP-C

 

 2019  J45.50  Severe persistent asthma, uncomplicated  Vanita Uldrich, 
FNP-C

 

 2019  J45.50  Severe persistent asthma, uncomplicated  Nayana Velarde M.D.

 

 2019  J45.50  Severe persistent asthma, uncomplicated  Vanita Uldrich, 
FNP-C

 

 2019  J30.1  Allergic rhinitis due to pollen  Nayana Velarde M.D.

 

 2019  J30.1  Allergic rhinitis due to pollen  Vanita Uldrich, FNP-C

 

 2019  J30.2  Other seasonal allergic rhinitis  Nayana Velarde M.D.

 

 2019  J30.2  Other seasonal allergic rhinitis  Vanita Uldrich, FNP-C

 

 2019  J30.81  Allergic rhinitis due to animal (cat) (dog)  Nayana Velarde M.D.



     hair and dander  

 

 2019  J30.81  Allergic rhinitis due to animal (cat) (dog)  Vanita 
Uldrich, FNP-C



     hair and dander  

 

 2019  J30.89  Other allergic rhinitis  Nayana Velarde M.D.

 

 2019  J30.89  Other allergic rhinitis  Vanita Uldrich, FNP-C

 

 2019  J45.50  Severe persistent asthma, uncomplicated  Vanita Uldrich, 
FNP-C

 

 2019  J45.50  Severe persistent asthma, uncomplicated  Nayana Velarde M.D.

 

 2019  J30.1  Allergic rhinitis due to pollen  Vanita Uldrich, FNP-C

 

 2019  J45.50  Severe persistent asthma, uncomplicated  Vanita Uldrich, 
FNP-C

 

 2019  J30.2  Other seasonal allergic rhinitis  Vanita Uldrich, FNP-C

 

 2019  J30.1  Allergic rhinitis due to pollen  Nayana Velarde M.D.

 

 2019  J30.81  Allergic rhinitis due to animal (cat) (dog)  Vanita 
Uldrich, FNP-C



     hair and dander  

 

 2019  J30.1  Allergic rhinitis due to pollen  Vanita Uldrich, FNP-C

 

 2019  J30.2  Other seasonal allergic rhinitis  Nayana Velarde M.D.

 

 2019  J30.2  Other seasonal allergic rhinitis  Vanita Uldrich, FNP-C

 

 2019  J30.81  Allergic rhinitis due to animal (cat) (dog)  Nayana Velarde M.D.



     hair and dander  

 

 2019  J30.81  Allergic rhinitis due to animal (cat) (dog)  Vanita 
Uldrich, FNP-C



     hair and dander  

 

 2019  J30.89  Other allergic rhinitis  Vanita Uldrich, FNP-C

 

 2019  J45.50  Severe persistent asthma, uncomplicated  Nayana Velarde M.D.

 

 2019  J45.50  Severe persistent asthma, uncomplicated  Vanitadariana Alcazar, 
LYDIAP-C

 

 2019  J30.1  Allergic rhinitis due to pollen  Nayana Velarde M.D.

 

 2019  J30.1  Allergic rhinitis due to pollen  Vanita Alcazar, LYDIAP-C

 

 2019  J30.2  Other seasonal allergic rhinitis  Vanita Inge, FNP-C

 

 2019  J30.81  Allergic rhinitis due to animal (cat) (dog)  Nayana Velarde M.D.



     hair and dander  

 

 2019  J30.81  Allergic rhinitis due to animal (cat) (dog)  Vanita 
Inge, MARIAN-C



     hair and dander  

 

 2019  J30.89  Other allergic rhinitis  VanitaLYDIA FieldP-C

 

 2019  J45.50  Severe persistent asthma, uncomplicated  Vanita Inge, 
FNP-C

 

 2019  J45.50  Severe persistent asthma, uncomplicated  Nayana Velarde M.D.

 

 2019  J45.50  Severe persistent asthma, uncomplicated  Vanitadariana Alcazar, 
LYDIAP-C

 

 2019  J30.1  Allergic rhinitis due to pollen  Nayana Velarde M.D.

 

 2019  J30.1  Allergic rhinitis due to pollen  MARIAN Mariscal-C

 

 2019  J30.2  Other seasonal allergic rhinitis  Nayana Velarde M.D.

 

 2019  J30.2  Other seasonal allergic rhinitis  Vanita Inge, FNP-C

 

 2019  J30.81  Allergic rhinitis due to animal (cat) (dog)  Nayana Velarde M.D.



     hair and dander  

 

 2019  J30.81  Allergic rhinitis due to animal (cat) (dog)  MARIAN Mariscal-C



     hair and dander  

 

 2019  J30.89  Other allergic rhinitis  Vanita LYDIA AlcazarP-C

 

 2019  R05  Cough  MARIAN Mariscal-C







Plan of Treatment

Future Appointment(s):2020 11:40 am - FRANKLIN Mariscal at [facility] 
[id number] - MARIAN Mariscal-CJ45.50 Severe persistent asthma, 
ptdxsxzlvhstjO71.2 Other seasonal allergic lqrzvahpL50.81 Allergic rhinitis due 
to animal (cat) (dog) hair and tqswneP26.89 Other allergic iefaweecT70.1 
Allergic rhinitis due to pollenRecommendations:Continue all medications as 
prescribed.Refrain from wearing perfumes/scented colognes while visitingour 
office.    OK for Xolair( 225 mg every 2 weeks) today   Continue the Dulera 2 
puffs twice a dayContinue the Spiriva 1.25 mcg 2 puffs daily  Continue the 
Azelestine 2 sprays daily  Continue the fluticasone nasal spray daily  Continue 
the cetirizine 1 daily  Continue the montelukast 1 daily  Continue the Ventolin 
2 puffs every 4 hours as needed for cough, shortness of breath, chest tightness
, shortness of breath, wheezing, or chest congestion.  Xolair in 2 weeks



Functional Status







 Description

 

 No Information Available







Mental Status







 Description

 

 No Information Available







Referrals







 Description

 

 No Information Available

## 2020-02-27 NOTE — XMS REPORT
Continuity of Care Document (CCD)

 Created on:2020



Patient:Teri Heaton

Sex:Female

:1950

External Reference #:[id number]





Demographics







 Address  [address] [place] [address] [address]



   [place] [address]

 

 Home Phone  [phone]

 

 Mobile Phone  [phone]

 

 Phone  [phone]

 

 Email Address  [email or web address]

 

 Preferred Language  en

 

 Marital Status  Not  or 

 

 Anabaptist Affiliation  Unknown

 

 Race  White

 

 Ethnic Group  Not  or 









Author







 Name  FRANKLIN Marino (transmitted by agent of provider August Castillo)

 

 Address  [address] [place] [address]









Care Team Providers







 Name  Role  Phone

 

 Nicole Murdock MD  Care Team Information   [phone]









Problems







 Active Problems  Provider  Date

 

 Allergic asthma without status asthmaticus  LAW White  Onset: 

 

 Allergic rhinitis  LAW White  Onset: 2014

 

 Allergic rhinitis due to pollen  LAW White  Onset: 2014

 

 Acute maxillary sinusitis  LAW White  Onset: 2014

 

 Uncomplicated moderate persistent asthma  August Castillo M.D.  Onset: 2017

 

 Cough  August Castillo M.D.  Onset: 10/11/2018







Social History







 Type  Date  Description  Comments

 

 Birth Sex    Unknown  

 

 ETOH Use    Currently consumes  1 glass of wine per



     alcohol  day

 

 Tobacco Use  Start: Unknown End:  Patient is a former  quit 26 years ago;



   Unknown  smoker  h/o tobacco x approx



       25 years (3ppd at



       time of quitting).

 

 Recreational Drug Use    Denies Drug Use  

 

 Smoking Status  Reviewed: 18  Patient is a former  quit 26 years ago;



     smoker  h/o tobacco x approx



       25 years (3ppd at



       time of quitting).







Allergies, Adverse Reactions, Alerts







 Active Allergies  Reaction  Severity  Comments  Date

 

 Feldene      blisters  2008

 

 Penicillin  Urticaria      2008







Medications







 Active Medications  SIG  Qnty  Indications  Ordering  Date



         Provider  

 

 Prednisone  take 2 tab a day  10tabs    Abigail Horowitz  2020



          20mg Tablets  for 5 days      FNP-C  



           

 

 Aerochamber Plus  for use with mdi  2units    Abigail Horowitz,  2020



 Josef-Vu        MARIAN-C  



       Misc          



           

 

 Azithromycin  take 2 tabs day  6tabs    Abigail Horowitz,  2020



            250mg  one and one tab a      FNP-C  



 Tablets  day for next 4        



   days.        

 

 Advair HFA  inhale two puffs  12gm    Vanita  2020



   by mouth twice a      MARIAN Alcazar-C  



 230-21mcg/Act Aerosol  day        



           

 

 Ipratropium Bromide  use with nebulizer  75ml    Vanita  2020



   am&pm      MARIAN Alcazar-C  



 0.02% Solution          



           

 

 Xolair  inject 225mg every  6units    Vanita  2020



      75mg/0.5ML Soln  2 weeks      MARIAN Alcazar-RAMOS  



 Prefill Syringe          



           

 

 Azelastine HCL  Spray One Spray  30units    Abigail Horowitz,  2019



 (Nasal)  Into Each Nostril      LYDIAP-C  



       137mcg/Spray  Once In The        



 Solution  Morning And Once        



   AT Night        

 

 Levalbuterol HCL  use via nebulizer  30ml    Vanita  2019



   every 4 -6 hours      MARIAN Alcazar-C  



 1.25mg/3ML Nebulizer  for shortness of        



   breath, cough or        



   wheezing        

 

 Epipen 2-Dl  use as directed  2units    Vanita  2019



         MARIAN Alcazar-C  



 0.3mg/0.3ML Solution          



 Auto-Inject          



           

 

 Cetirizine HCL  1 by mouth every  30tabs    Vaishali Andino,  2018



              10mg  day      FNP-C  



 Tablets          



           

 

 Ventolin HFA  2 inhalations q4h  1units  J45.40  August Castillo,  2015



   prn coughing or      M.D.  



 108(90Base) mcg/Act  wheezing        



 Aerosol          



           

 

 Fluticasone  two sprays in each  1units    August Castillo,  2013



 Propionate  nostril once daily      M.D.  



          50mcg/Act          



 Suspension          



           

 

 Levothyroxine Sodium  1 tab daily.      Unknown  



           



 125mcg Tablets          



           

 

 Singulair  take 1 tablet by  30tabs    Vanita  



         10mg Tablets  mouth every day      MARIAN Alcazar-RAMOS  



   needs annual        

 

 Atorvastatin Calcium  1 every day      Unknown  



           



 Powder          



           

 

 Aspirin Low Dose  1 every day      Unknown  



                81mg          



 Chewtabs          



           

 

 Irbesartan  1 tab every day      Unknown  



          75mg Tablets          



           

 

 Klor-Con M10  1 tab every day      Unknown  



            10Meq          



 Tablets ER          



           

 

 Pantoprazole Sodium  1 tab daily.      Unknown  



           



 40mg Tablets DR          



           

 

 Spironolactone  1/2 tablet daily      Unknown  



              25mg          



 Tablets          



           

 

 Furosemide  1 tabs every day      Unknown  



          80mg Tablets          



           

 

 Spiriva Respimat  2 inhalations once  1units  J45.40  Vanita  



   daily      FRANKLIN Alcazar  



 2.5mcg/Act Aerosol          



           

 

 Allopurinol  once a day      Unknown  



           300mg          



 Tablets          



           







Medications Administered in Office







 Medication  SIG  Qnty  Indications  Ordering Provider  Date

 

 Biologic Agent        FRANKLIN Marino  2020



 Administration          



        Injection          



           

 

 Biologic Agent        MARIAN Mariscal-C  2020



 Administration          



        Injection          



           

 

 Therapeutic, Prophylactic Or        Nayana Velarde M.D.  2020



 Diagnostic Injection Subq/Im          



           



 Injection          

 

 Biologic Agent        MARIAN Mariscal-C  2020



 Administration          



        Injection          



           

 

 Therapeutic, Prophylactic Or        Nayana Velarde M.D.  2020



 Diagnostic Injection Subq/Im          



           



 Injection          

 

 Biologic Agent        LYDIA MariscalP-C  2019



 Administration          



        Injection          



           

 

 Therapeutic, Prophylactic Or        Vanita MARIAN Alcazar-C  2019



 Diagnostic Injection Subq/Im          



           



 Injection          

 

 Biologic Agent        LYDIA MariscalP-C  2019



 Administration          



        Injection          



           

 

 Therapeutic, Prophylactic Or        Vanita Ulbasia FNP-C  2019



 Diagnostic Injection Subq/Im          



           



 Injection          

 

 Biologic Agent        Vanita Inge FNP-C  2019



 Administration          



        Injection          



           

 

 Therapeutic, Prophylactic Or        Vanita Ulbasia, FNP-C  2019



 Diagnostic Injection Subq/Im          



           



 Injection          

 

 Biologic Agent        Vanita Ulbasia, FNP-C  2019



 Administration          



        Injection          



           

 

 Therapeutic, Prophylactic Or        Vanita Ulbasia, FNP-C  2019



 Diagnostic Injection Subq/Im          



           



 Injection          

 

 Biologic Agent        Vanita Uldrich, FNP-C  10/23/2019



 Administration          



        Injection          



           

 

 Therapeutic, Prophylactic Or        Vanita Uldrich, FNP-C  10/23/2019



 Diagnostic Injection Subq/Im          



           



 Injection          

 

 Biologic Agent        Vanita Uldrich, FNP-C  10/09/2019



 Administration          



        Injection          



           

 

 Therapeutic, Prophylactic Or        Vanita Uldrich, FNP-C  10/09/2019



 Diagnostic Injection Subq/Im          



           



 Injection          

 

 Therapeutic, Prophylactic Or        Vanita Uldrich, FNP-C  2019



 Diagnostic Injection Subq/Im          



           



 Injection          

 

 Biologic Agent        Vanita Uldrich, FNP-C  2019



 Administration          



        Injection          



           

 

 Therapeutic, Prophylactic Or        Vanita Uldrich, FNP-C  2019



 Diagnostic Injection Subq/Im          



           



 Injection          

 

 Biologic Agent        Vanita Uldrich, FNP-C  2019



 Administration          



        Injection          



           

 

 Therapeutic, Prophylactic Or        Vanita Uldrich, FNP-C  2019



 Diagnostic Injection Subq/Im          



           



 Injection          

 

 Biologic Agent        Vanita Uldrich, FNP-C  2019



 Administration          



        Injection          



           

 

 Therapeutic, Prophylactic Or        Vanita Uldrich, FNP-C  2019



 Diagnostic Injection Subq/Im          



           



 Injection          

 

 Biologic Agent        Vanita Uldrich, FNP-C  2019



 Administration          



        Injection          



           

 

 Therapeutic, Prophylactic Or        Vanita Uldrich, FNP-C  2019



 Diagnostic Injection Subq/Im          



           



 Injection          

 

 Biologic Agent        Vanita Uldrich, FNP-C  2019



 Administration          



        Injection          



           

 

 Therapeutic, Prophylactic Or        Vanita Uldrich, FNP-C  2019



 Diagnostic Injection Subq/Im          



           



 Injection          

 

 Therapeutic, Prophylactic Or        Vanita Uldrich, FNP-C  2019



 Diagnostic Injection Subq/Im          



           



 Injection          

 

 Biologic Agent        Vanita Uldrich, FNP-C  2019



 Administration          



        Injection          



           

 

 Therapeutic, Prophylactic Or        Nayana Velarde M.D.  2019



 Diagnostic Injection Subq/Im          



           



 Injection          

 

 Biologic Agent        Vanita Uldrich, FNP-C  2019



 Administration          



        Injection          



           

 

 Therapeutic, Prophylactic Or        Vanita Uldrich, FNP-C  2019



 Diagnostic Injection Subq/Im          



           



 Injection          

 

 Biologic Agent        Vanita Uldrich, FNP-C  2019



 Administration          



        Injection          



           

 

 Therapeutic, Prophylactic Or        Vanita Uldrich, FNP-C  2019



 Diagnostic Injection Subq/Im          



           



 Injection          

 

 Biologic Agent        Vanita Uldrich, FNP-C  2019



 Administration          



        Injection          



           

 

 Therapeutic, Prophylactic Or        Vanita Uldrich, FNP-C  2019



 Diagnostic Injection Subq/Im          



           



 Injection          

 

 Biologic Agent        Vanita Uldrich, FNP-C  04/10/2019



 Administration          



        Injection          



           

 

 Therapeutic, Prophylactic Or        Vanita Uldrich, FNP-C  04/10/2019



 Diagnostic Injection Subq/Im          



           



 Injection          

 

 Biologic Agent        Vanita Uldrich, FNP-C  2019



 Administration          



        Injection          



           

 

 Therapeutic, Prophylactic Or        Vanita Uldrich, FNP-C  2019



 Diagnostic Injection Subq/Im          



           



 Injection          

 

 Biologic Agent        Kavita Raul, FNP-C  2019



 Administration          



        Injection          



           

 

 Therapeutic, Prophylactic Or        Kavita Raul, FNP-C  2019



 Diagnostic Injection Subq/Im          



           



 Injection          

 

 Biologic Agent        Vanita Uldrich, FNP-C  2019



 Administration          



        Injection          



           

 

 Therapeutic, Prophylactic Or        Vanita Uldrich, FNP-C  2019



 Diagnostic Injection Subq/Im          



           



 Injection          

 

 Biologic Agent        Vanita Uldrich, FNP-C  2019



 Administration          



        Injection          



           

 

 Therapeutic, Prophylactic Or        August Castillo M.D.  2019



 Diagnostic Injection Subq/Im          



           



 Injection          

 

 Biologic Agent        Vanita Uldrich, FNP-C  2019



 Administration          



        Injection          



           

 

 Therapeutic, Prophylactic Or        Nayana Velarde M.D.  2019



 Diagnostic Injection Subq/Im          



           



 Injection          

 

 Biologic Agent        Vanita Uldrich, FNP-C  2019



 Administration          



        Injection          



           

 

 Therapeutic, Prophylactic Or        Vanita Uldrich, FNP-C  2019



 Diagnostic Injection Subq/Im          



           



 Injection          

 

 Celestone/Cortisone        Tere Alvarado,  2015



 [id number] 1 cc        PH.D, RPA-C  



          Injection          



           

 

 Celestone/Cortisone        Tere Alvarado,  2015



 [id number] 1 cc        PH.D, RPA-C  



          Injection          



           

 

 Injection        Robbi Rubinstein, M.D.  2013



   Injection          

 

 Injection        Allergy Injection  2013



   Injection          

 

 Injection        Allergy Injection  2013



   Injection          

 

 Injection        Allergy Injection  2013



   Injection          

 

 Injection        Robbi Rubinstein, M.D.  2013



   Injection          

 

 Injection        Allergy Injection  2013



   Injection          

 

 Injection        Robbi Rubinstein, M.DLiz  2013



   Injection          

 

 Injection        Allergy Injection  2013



   Injection          

 

 Injection        Robbi Rubinstein, M.D.  2013



   Injection          

 

 Injection        Robbi Rubinstein, GISELADLiz  2013



   Injection          

 

 Injection        Elliot Rubinstein, M.D.  2013



   Injection          

 

 Injection        Elliot Rubinstein, M.D.  2013



   Injection          

 

 Injection        Nayana Velarde M.D.  2012



   Injection          

 

 Injection        Nayana Velarde M.D.  2012



   Injection          

 

 Injection        Nayana Velarde M.D.  2012



   Injection          

 

 Injection        Nayana Velarde M.D.  10/23/2012



   Injection          

 

 Injection        Nayana Velarde M.D.  10/02/2012



   Injection          

 

 Injection        Nayana Velarde M.D.  2012



   Injection          

 

 Injection        Nayana Velarde M.D.  2012



   Injection          

 

 Injection        Nayana Velarde M.D.  2012



   Injection          

 

 Injection        Nayana Velarde M.D.  2012



   Injection          

 

 Injection        Nayana Velarde M.D.  2012



   Injection          

 

 Injection        Nayana Velarde M.D.  07/10/2012



   Injection          

 

 Injection        Nayana Velarde M.D.  2012



   Injection          

 

 Injection        Nayana Velarde M.D.  2012



   Injection          

 

 Injection        Nayana Velarde M.D.  2012



   Injection          

 

 Injection        Nayana Velarde M.D.  05/15/2012



   Injection          

 

 Injection        Nayana Velarde M.D.  2012



   Injection          

 

 Injection        Nayana Velarde M.D.  2012



   Injection          

 

 Injection        Nayana Velarde M.D.  04/10/2012



   Injection          

 

 Injection        Nayana Velarde M.D.  2012



   Injection          

 

 Injection        Nayana Velarde M.D.  2012



   Injection          

 

 Injection        Robbi Rubinstein, M.DLiz  2012



   Injection          

 

 Injection        Robbi Rubinstein, M.DLiz  2012



   Injection          

 

 Injection        Augustlila Castillo, M.D.  12/15/2011



   Injection          

 

 Injection        Robbi Rubinstein, M.DLiz  2011



   Injection          

 

 Injection        Robbi Rubinstein, M.DLiz  2011



   Injection          

 

 Injection        Robbi Rubinstein, M.DLiz  2011



   Injection          

 

 Injection        Robbi Rubinstein, M.DLiz  10/13/2011



   Injection          

 

 Injection        Robbi Rubinstein, M.DLiz  2011



   Injection          

 

 Injection        Robbi Rubinstein, M.DLiz  2011



   Injection          

 

 Injection        Robbi Rubinstein, M.DLiz  2011



   Injection          

 

 Injection        Robbi Rubinstein, M.DLiz  2011



   Injection          

 

 Injection        Robbi Rubinstein, M.DLiz  2011



   Injection          

 

 Injection        Robbi Rubinstein, M.DLiz  2011



   Injection          

 

 Injection        Robbi Rubinstein, M.DLiz  2011



   Injection          

 

 Injection        Robbi Rubinstein, M.DLiz  2011



   Injection          

 

 Injection        Robbi Rubinstein, M.DLiz  2011



   Injection          

 

 Injection        Robbi Rubinstein, M.DLiz  2011



   Injection          

 

 Injection        Robbi Rubinstein, M.DLiz  2011



   Injection          

 

 Injection        Robbi Rubinstein, M.DLiz  2011



   Injection          

 

 Injection        Robbi Rubinstein, M.DLiz  2011



   Injection          

 

 Injection        Robbi Rubinstein, M.DLiz  2011



   Injection          

 

 Injection        Robbi Rubinstein, M.DLiz  2011



   Injection          

 

 Injection        Robbi Rubinstein, M.DLiz  2010



   Injection          

 

 Injection        Robbi Rubinstein, M.DLiz  2010



   Injection          

 

 Injection        Robbi Rubinstein, M.DLiz  10/12/2010



   Injection          

 

 Injection        Robbi Rubinstein, M.DLiz  2010



   Injection          

 

 Injection        Robbi Rubinstein, M.DLiz  2010



   Injection          

 

 Injection        Robbi Rubinstein, M.DLiz  2010



   Injection          

 

 Injection        Robbi Rubinstein, M.DLiz  08/10/2010



   Injection          

 

 Injection        Nayana M JIMMY Velarde  2010



   Injection          

 

 Injection        Robbi Rubinstein, MLizDLiz  2010



   Injection          

 

 Injection        Robbi Rubinstein, M.DLiz  2010



   Injection          

 

 Injection        Robbi Rubinstein, M.DLiz  06/15/2010



   Injection          

 

 Injection        Robbi Rubinstein, M.DLiz  2010



   Injection          

 

 Injection        Robbi Rubinstein, M.DLiz  2010



   Injection          

 

 Injection        Robbi Rubinstein, M.DLiz  2010



   Injection          

 

 Injection        Robbi Rubinstein, M.DLiz  2010



   Injection          

 

 Injection        Robbi Rubinstein, M.DLiz  2010



   Injection          

 

 Injection        Robbi Rubinstein, M.DLiz  2010



   Injection          

 

 Injection        Robbi Rubinstein, M.DLiz  2010



   Injection          

 

 Injection        Robbi Rubinstein, M.DLiz  2010



   Injection          

 

 Injection        Nayana Velarde M.D.  2010



   Injection          

 

 Injection        Curt Sánchez,  2010



   Injection        M.D.  

 

 Injection        Robbi Rubinstein, M.DLiz  2010



   Injection          

 

 Injection        Robbi Rubinstein, M.DLiz  2009



   Injection          

 

 Injection        Curt Sánchez,  12/10/2009



   Injection        M.D.  

 

 Injection        Robbi Rubinstein, M.DLiz  2009



   Injection          

 

 Injection        Robbi Rubinstein, M.DLiz  11/10/2009



   Injection          

 

 Injection        Robbi Rubinstein, M.DLiz  10/27/2009



   Injection          

 

 Injection        Robbi Rubinstein, M.DLiz  10/08/2009



   Injection          

 

 Injection        Robbi Rubinstein, M.DLiz  2009



   Injection          

 

 Injection        Robbi Rubinstein, M.DLiz  09/10/2009



   Injection          

 

 Injection        Robbi Rubinstein, M.DLiz  2009



   Injection          

 

 Injection        Robbi Rubinstein, M.DLiz  2009



   Injection          

 

 Injection        Robbi Rubinstein, M.DLiz  2009



   Injection          

 

 Injection        Robbi Rubinstein, M.DLiz  2009



   Injection          

 

 Injection        Robbi Rubinstein, M.DLiz  2009



   Injection          

 

 Injection        Curt Sánchez,  2009



   Injection        M.D.  

 

 Injection        Robbi Rubinstein, MLizDLiz  2009



   Injection          

 

 Injection        Robbi Rubinstein, M.DLiz  2009



   Injection          

 

 Injection        Robbi Rubinstein, M.DLiz  2009



   Injection          

 

 Injection        Robbi Rubinstein, M.DLiz  2009



   Injection          

 

 Injection        Robbi Rubinstein, M.DLiz  2009



   Injection          

 

 Injection        Robbi Rubinstein, M.DLiz  2009



   Injection          

 

 Injection        Robbi Rubinstein, M.DLiz  2009



   Injection          

 

 Injection        Robbi Rubinstein, M.DLiz  2009



   Injection          

 

 Injection        Robbi Rubinstein, M.DLiz  2009



   Injection          

 

 Injection        Robbi Rubinstein, M.DLiz  2009



   Injection          

 

 Injection        Robbi Rubinstein, GILBERT.DLiz  2009



   Injection          

 

 Injection        Robbi Rubinstein, M.DLiz  2009



   Injection          

 

 Injection        Robbi Rubinstein, GILBERT.DLiz  01/15/2009



   Injection          

 

 Injection        Robbi Rubinstein, M.D.  2008



   Injection          

 

 Injection        Robbi Rubinstein, GILBERT.DLiz  2008



   Injection          

 

 Injection        Robbi Rubinstein, M.DLiz  2008



   Injection          

 

 Injection        Robbi Rubinstein, GISELADLiz  2008



   Injection          

 

 Injection        Robbi Rubinstein, GISELADLiz  2008



   Injection          

 

 Injection        Robbi Rubinstein, M.DLiz  2008



   Injection          

 

 Injection        Robbi Rubinstein, M.DLiz  10/30/2008



   Injection          

 

 Injection        Robbi Rubinstein, GISELADLiz  10/23/2008



   Injection          

 

 Injection        Robbi Rubinstein, GILBERT.DLiz  10/16/2008



   Injection          

 

 Injection        Robbi Rubinstein, M.DLiz  10/09/2008



   Injection          

 

 Injection        Robbi Rubinstein, M.DLiz  10/02/2008



   Injection          

 

 Injection        Robbi Rubinstein, M.DLiz  2008



   Injection          

 

 Injection        Robbi Rubinstein, M.DLiz  2008



   Injection          

 

 Injection        Robbi Rubinstein, M.DLiz  2008



   Injection          

 

 Injection        Robbi Rubinstein, M.DLiz  2008



   Injection          

 

 Injection        Robbi Rubinstein, GISELADLiz  2008



   Injection          

 

 Injection        Robbi Rubinstein, MLizDLiz  2008



   Injection          

 

 Injection        Robbi RubinsteinJIMMY lawler  2008



   Injection          

 

 Injection        Robbi RubinsteinJIMMY lawler  2008



   Injection          

 

 Injection        Robbi RubinsteinJIMMY lawler  2008



   Injection          

 

 Injection        Robbi Rubinstein, M.D.  2008



   Injection          

 

 Injection        Robbi RubinsteinJIMMY lawler  07/15/2008



   Injection          

 

 Injection        Robbi RubinsteinJIMMY lawler  07/10/2008



   Injection          

 

 Injection        Robbi RubinsteinJIMMY lawler  2008



   Injection          

 

 Injection        Robbi Rubinstein, M.D.  2008



   Injection          

 

 Injection        Robbi Rubinstein, M.D.  2008



   Injection          

 

 Injection        Robbi Rubinstein, M.D.  2008



   Injection          

 

 Injection        Robbi Rubinstein, M.D.  2008



   Injection          

 

 Injection        Robbi Rubinstein, M.D.  2008



   Injection          

 

 Injection        Robbi Rubinstein, M.D.  2008



   Injection          

 

 Injection        Robbi Rubinstein, M.D.  2008



   Injection          

 

 Injection        Robbi Rubinstein, M.D.  2008



   Injection          

 

 Injection        Robbi Rubinstein, M.D.  2008



   Injection          







Immunizations







 CPT Code  Status  Date  Vaccine  Lot #

 

 [id number]  Given  10/01/2013  Influenza Vaccine  

 

 [id number]  Given  2011  Pneumococcal Vaccine  

 

 75650  Given  Unknown  Pneumococcal Vaccine  

 

 16802  Given  Unknown  Influenza Vaccine  

 

 84264  Given  Unknown  Influenza Vaccine  

 

 60215  Given  Unknown  Influenza Vaccine  

 

 58600  Given  Unknown  Influenza Vaccine  

 

 99140  Given  Unknown  Influenza Vaccine  







Vital Signs







 Date  Vital  Result  Comment

 

 2020 11:18am  Height  65 inches  5'5"









 Weight  241.00 lb  

 

 Weight  109.318 kg  

 

 Respiratory Rate  16 /min  

 

 Heart Rate  87 /min  

 

 O2 % BldC Oximetry  95 %  

 

 BP Systolic  135 mmHg  

 

 BP Diastolic  64 mmHg  

 

 Asthma Control Test  10  

 

 BMI (Body Mass Index)  40.1 kg/m2  









 2020 11:28am  Height  65 inches  5'5"









 Weight  241.00 lb  

 

 Weight  109.318 kg  

 

 Respiratory Rate  16 /min  

 

 Heart Rate  88 /min  

 

 O2 % BldC Oximetry  98 %  

 

 BP Systolic  132 mmHg  

 

 BP Diastolic  62 mmHg  

 

 BMI (Body Mass Index)  40.1 kg/m2  







Results







 Description

 

 No Information Available







Procedures







 Date  Code  Description  Status

 

 2020  21076  Biologic Agent Administration  Completed

 

 2020  45126  Biologic Agent Administration  Completed

 

 2020  99165  Therapeutic, Prophylactic Or Diagnostic Injection Subq/Im  
Completed

 

 2020  10782  Biologic Agent Administration  Completed

 

 2020  77187  Therapeutic, Prophylactic Or Diagnostic Injection Subq/Im  
Completed

 

 2020  29773  Ippb  Completed

 

 2019  53464  Biologic Agent Administration  Completed

 

 2019  80002  Therapeutic, Prophylactic Or Diagnostic Injection Subq/Im  
Completed

 

 2019  92423  Biologic Agent Administration  Completed

 

 2019  25464  Therapeutic, Prophylactic Or Diagnostic Injection Subq/Im  
Completed

 

 2019  85511  Ippb  Completed

 

 2019  27935  Therapeutic, Prophylactic Or Diagnostic Injection Subq/Im  
Completed

 

 2019  33950  Biologic Agent Administration  Completed

 

 2019  03148  Biologic Agent Administration  Completed

 

 2019  76062  Therapeutic, Prophylactic Or Diagnostic Injection Subq/Im  
Completed

 

 10/23/2019  58316  Biologic Agent Administration  Completed

 

 10/23/2019  09621  Therapeutic, Prophylactic Or Diagnostic Injection Subq/Im  
Completed

 

 10/09/2019  84532  Biologic Agent Administration  Completed

 

 10/09/2019  99759  Therapeutic, Prophylactic Or Diagnostic Injection Subq/Im  
Completed

 

 2019  82908  Therapeutic, Prophylactic Or Diagnostic Injection Subq/Im  
Completed

 

 2019  89393  Biologic Agent Administration  Completed

 

 2019  40733  Therapeutic, Prophylactic Or Diagnostic Injection Subq/Im  
Completed

 

 2019  71439  Biologic Agent Administration  Completed

 

 2019  72847  Therapeutic, Prophylactic Or Diagnostic Injection Subq/Im  
Completed







Medical Devices







 Description

 

 No Information Available







Encounters







 Type  Date  Location  Provider  Dx  Diagnosis

 

 Office Visit  2019  [facility]  Vanita Alcazar,  J45.50  Severe 
persistent



   11:20a    FNP-C    asthma, uncomplicated









 J30.1  Allergic rhinitis due to pollen

 

 J45.50  Severe persistent asthma, uncomplicated

 

 J30.2  Other seasonal allergic rhinitis

 

 J30.81  Allergic rhinitis due to animal (cat) (dog) hair and dander

 

 J30.1  Allergic rhinitis due to pollen

 

 J30.2  Other seasonal allergic rhinitis

 

 J30.81  Allergic rhinitis due to animal (cat) (dog) hair and dander

 

 J30.89  Other allergic rhinitis







Assessments







 Date  Code  Description  Provider

 

 2020  J45.51  Severe persistent asthma with (acute)  MARIAN Marino-C



     exacerbation  

 

 2020  J20.9  Acute bronchitis, unspecified  MARIAN Marino-C

 

 2020  J45.50  Severe persistent asthma, uncomplicated  Nayana Velarde M.D.

 

 2020  J45.50  Severe persistent asthma, uncomplicated  Nayana Velarde M.D.

 

 2020  J45.50  Severe persistent asthma, uncomplicated  Vanita Uldrich, 
FNP-C

 

 2020  J30.2  Other seasonal allergic rhinitis  Nayana Velarde M.D.

 

 2020  J30.2  Other seasonal allergic rhinitis  Vanita Uldrich, FNP-C

 

 2020  J30.81  Allergic rhinitis due to animal (cat) (dog)  Nayana Velarde M.D.



     hair and dander  

 

 2020  J30.81  Allergic rhinitis due to animal (cat) (dog)  Vanita 
Uldrich, FNP-C



     hair and dander  

 

 2020  J30.89  Other allergic rhinitis  Nayana Velarde M.D.

 

 2020  J30.89  Other allergic rhinitis  Vanita Uldrich, FNP-C

 

 2020  J30.1  Allergic rhinitis due to pollen  Vanita Uldrich, FNP-C

 

 2020  J45.50  Severe persistent asthma, uncomplicated  Nayana Velarde M.D.

 

 2020  J45.50  Severe persistent asthma, uncomplicated  Vanita Uldrich, 
FNP-C

 

 2020  J30.2  Other seasonal allergic rhinitis  Nayana Velarde M.D.

 

 2020  J30.2  Other seasonal allergic rhinitis  Vanita Uldrich, FNP-C

 

 2020  J30.81  Allergic rhinitis due to animal (cat) (dog)  Nayana Velarde M.D.



     hair and dander  

 

 2020  J30.81  Allergic rhinitis due to animal (cat) (dog)  Vanita 
Uldrich, FNP-C



     hair and dander  

 

 2020  J30.89  Other allergic rhinitis  Nayana Velarde M.D.

 

 2020  J30.89  Other allergic rhinitis  Vanita Uldrich, FNP-C

 

 2020  J30.1  Allergic rhinitis due to pollen  Vanita Uldrich, FNP-C

 

 2019  J45.50  Severe persistent asthma, uncomplicated  Vanita Uldrich, 
FNP-C

 

 2019  J30.2  Other seasonal allergic rhinitis  Nayana Velarde M.D.

 

 2019  J30.2  Other seasonal allergic rhinitis  Vanita Uldrich, FNP-C

 

 2019  J30.81  Allergic rhinitis due to animal (cat) (dog)  Nayana Velarde M.D.



     hair and dander  

 

 2019  J30.81  Allergic rhinitis due to animal (cat) (dog)  Vanita 
Uldrich, FNP-C



     hair and dander  

 

 2019  J30.89  Other allergic rhinitis  Nayana Velarde M.D.

 

 2019  J30.89  Other allergic rhinitis  Vanita Uldrich, FNP-C

 

 2019  J30.1  Allergic rhinitis due to pollen  Nayana Velarde M.D.

 

 2019  J30.1  Allergic rhinitis due to pollen  Vanita Uldrich, FNP-C

 

 2019  J45.50  Severe persistent asthma, uncomplicated  Vanita Uldrich, 
FNP-C

 

 2019  J45.50  Severe persistent asthma, uncomplicated  Nayana Velarde M.D.

 

 2019  J30.2  Other seasonal allergic rhinitis  Vanita Uldrich, FNP-C

 

 2019  J45.50  Severe persistent asthma, uncomplicated  Vanita Uldrich, 
FNP-C

 

 2019  J30.81  Allergic rhinitis due to animal (cat) (dog)  Vanita 
Uldrich, FNP-C



     hair and dander  

 

 2019  J30.2  Other seasonal allergic rhinitis  Nayana Velarde M.D.

 

 2019  J30.89  Other allergic rhinitis  Vanita Uldrich, FNP-C

 

 2019  J30.2  Other seasonal allergic rhinitis  Vanita Uldrich, FNP-C

 

 2019  J30.81  Allergic rhinitis due to animal (cat) (dog)  Nayana Velarde M.D.



     hair and dander  

 

 2019  J30.81  Allergic rhinitis due to animal (cat) (dog)  Vanita 
Uldrich, FNP-C



     hair and dander  

 

 2019  J30.89  Other allergic rhinitis  Nayana Velarde M.D.

 

 2019  J30.89  Other allergic rhinitis  Vanita Uldrich, FNP-C

 

 2019  J30.1  Allergic rhinitis due to pollen  Vanita Uldrich, FNP-C

 

 2019  J45.50  Severe persistent asthma, uncomplicated  Vanita Uldrich, 
FNP-C

 

 2019  J45.50  Severe persistent asthma, uncomplicated  Nayana Velarde M.D.

 

 2019  J45.50  Severe persistent asthma, uncomplicated  Vanita Uldrich, 
FNP-C

 

 2019  J30.2  Other seasonal allergic rhinitis  Nayana Velarde M.D.

 

 2019  J30.2  Other seasonal allergic rhinitis  Vanita Uldrich, FNP-C

 

 2019  J30.81  Allergic rhinitis due to animal (cat) (dog)  Nayana Velarde M.D.



     hair and dander  

 

 2019  J30.81  Allergic rhinitis due to animal (cat) (dog)  Vanita 
Uldrich, FNP-C



     hair and dander  

 

 2019  J30.89  Other allergic rhinitis  Nayana Velarde M.D.

 

 2019  J30.89  Other allergic rhinitis  Vanita Uldrich, FNP-C

 

 2019  J30.1  Allergic rhinitis due to pollen  Vanita Uldrich, FNP-C

 

 2019  J45.50  Severe persistent asthma, uncomplicated  Vanita Uldrich, 
FNP-C

 

 2019  J45.50  Severe persistent asthma, uncomplicated  Nayana Velarde M.D.

 

 2019  J45.50  Severe persistent asthma, uncomplicated  Vanita Uldrich, 
FNP-C

 

 2019  J30.2  Other seasonal allergic rhinitis  Nayana Velarde M.D.

 

 2019  J30.2  Other seasonal allergic rhinitis  Vanita Uldrich, FNP-C

 

 2019  J30.81  Allergic rhinitis due to animal (cat) (dog)  Nayana Velarde M.D.



     hair and dander  

 

 2019  J30.81  Allergic rhinitis due to animal (cat) (dog)  Vanita 
Uldrich, FNP-C



     hair and dander  

 

 2019  J30.89  Other allergic rhinitis  Nayana Velarde M.D.

 

 2019  J30.89  Other allergic rhinitis  Vanita Uldrich, FNP-C

 

 2019  J30.1  Allergic rhinitis due to pollen  Vanita Uldrich, FNP-C

 

 10/23/2019  J45.50  Severe persistent asthma, uncomplicated  Vanita Uldrich, 
FNP-C

 

 10/23/2019  J30.2  Other seasonal allergic rhinitis  Vanita Uldrich, FNP-C

 

 10/23/2019  J30.81  Allergic rhinitis due to animal (cat) (dog)  Vanita 
Uldrich, FNP-C



     hair and dander  

 

 10/23/2019  J30.89  Other allergic rhinitis  Vanita Uldrich, FNP-C

 

 10/23/2019  J30.1  Allergic rhinitis due to pollen  Vanita Uldrich, FNP-C

 

 10/09/2019  J45.50  Severe persistent asthma, uncomplicated  Vanita Uldrich, 
FNP-C

 

 10/09/2019  J45.50  Severe persistent asthma, uncomplicated  Nayana Velarde M.D.

 

 10/09/2019  J45.50  Severe persistent asthma, uncomplicated  Vanita Uldrich, 
FNP-C

 

 10/09/2019  J30.2  Other seasonal allergic rhinitis  Nayana Velarde M.D.

 

 10/09/2019  J30.2  Other seasonal allergic rhinitis  Vanita Uldrich, FNP-C

 

 10/09/2019  J30.81  Allergic rhinitis due to animal (cat) (dog)  Nayana Velarde M.D.



     hair and dander  

 

 10/09/2019  J30.81  Allergic rhinitis due to animal (cat) (dog)  Vanita 
Uldrich, FNP-C



     hair and dander  

 

 10/09/2019  J30.89  Other allergic rhinitis  Nayana Velarde M.D.

 

 10/09/2019  J30.89  Other allergic rhinitis  Vanita Uldrich, FNP-C

 

 2019  J45.50  Severe persistent asthma, uncomplicated  Vanita Uldrich, 
FNP-C

 

 2019  J45.50  Severe persistent asthma, uncomplicated  Nayana Velarde M.D.

 

 2019  J45.50  Severe persistent asthma, uncomplicated  Vanita Uldrich, 
FNP-C

 

 2019  J30.1  Allergic rhinitis due to pollen  Nayana Velarde M.D.

 

 2019  J30.1  Allergic rhinitis due to pollen  Vanita Uldrich, FNP-C

 

 2019  J30.2  Other seasonal allergic rhinitis  Nayana Velarde M.D.

 

 2019  J30.2  Other seasonal allergic rhinitis  Vanita Uldrich, FNP-C

 

 2019  J30.81  Allergic rhinitis due to animal (cat) (dog)  Nayana Velarde M.D.



     hair and dander  

 

 2019  J30.81  Allergic rhinitis due to animal (cat) (dog)  Vanita 
Uldrich, FNP-C



     hair and dander  

 

 2019  J30.89  Other allergic rhinitis  Vanita Uldrich, FNP-C

 

 2019  J45.50  Severe persistent asthma, uncomplicated  Vanita Uldrich, 
FNP-C

 

 2019  J45.50  Severe persistent asthma, uncomplicated  Nayana Velarde M.D.

 

 2019  J45.50  Severe persistent asthma, uncomplicated  Vanita Uldrich, 
FNP-C

 

 2019  J30.1  Allergic rhinitis due to pollen  Nayana Velarde M.D.

 

 2019  J30.1  Allergic rhinitis due to pollen  Vanita Ulich, FNP-C

 

 2019  J30.2  Other seasonal allergic rhinitis  Nayana Velarde M.D.

 

 2019  J30.2  Other seasonal allergic rhinitis  Vanita Uldrich, FNP-C

 

 2019  J30.81  Allergic rhinitis due to animal (cat) (dog)  Nayana Velarde M.D.



     hair and dander  

 

 2019  J30.81  Allergic rhinitis due to animal (cat) (dog)  Vanita 
Uldrich, FNP-C



     hair and dander  

 

 2019  J30.89  Other allergic rhinitis  Nayana Velarde M.D.

 

 2019  J30.89  Other allergic rhinitis  Vanita Uldrich, FNP-C

 

 2019  J45.50  Severe persistent asthma, uncomplicated  Vanita Uldrich, 
FNP-C

 

 2019  J45.50  Severe persistent asthma, uncomplicated  Nayana Velarde M.D.

 

 2019  J30.1  Allergic rhinitis due to pollen  Vanita Uldrich, FNP-C

 

 2019  J45.50  Severe persistent asthma, uncomplicated  Vanita Uldrich, 
FNP-C

 

 2019  J30.2  Other seasonal allergic rhinitis  Vanita Uldrich, FNP-C

 

 2019  J30.1  Allergic rhinitis due to pollen  Nayana Velarde M.D.

 

 2019  J30.81  Allergic rhinitis due to animal (cat) (dog)  Vanita 
Uldrich, FNP-C



     hair and dander  

 

 2019  J30.1  Allergic rhinitis due to pollen  Vanita Uldrich, FNP-C

 

 2019  J30.2  Other seasonal allergic rhinitis  Nayana Velarde M.D.

 

 2019  J30.2  Other seasonal allergic rhinitis  Vanita Uldrich, FNP-C

 

 2019  J30.81  Allergic rhinitis due to animal (cat) (dog)  Nayana Velarde M.D.



     hair and dander  

 

 2019  J30.81  Allergic rhinitis due to animal (cat) (dog)  Vanita 
Uldrich, FNP-C



     hair and dander  

 

 2019  J30.89  Other allergic rhinitis  Vanita Uldrich, FNP-C







Plan of Treatment

Future Appointment(s):2020  1:20 pm - Vanita Alcazar, FNP-C at [facility]2020 - MARIAN Marino-CJ45.51 Severe persistent asthma with (
acute) [id number] Acute bronchitis, unspecifiedFollow up:2020Recommendations:Will hold Xolair today  Start Azithromycin 250 mg take 2 
tabs today, then one tab daily x 4 days  Start Prednisone 20mg  take 2 tabs 
daily x 5 days. Take with food May choose to take 20mg twice a day  Duoneb IPPB 
now  Use Levalbuterol routinely every 4-6 hours for  a week and continue all 
meds as prscribed:  Advair HFA  230-21 mcg/Act  inhale two puffs by mouth twice 
a day Ipratropium Bromide  0.02 % use with nebulizer am&amp;pm Xolair  75 mg/
0.5ml  inject 225mg every 2 weeks Azelastine HCL (Nasal) 137 mcg/spray  spray 
one spray into each nostril once in the morning and once AT night 
LevalbuterolHCL  1.25 mg/3ml  use via nebulizer every 4 -6 hours for shortness 
of breath, cough or wheezing Epipen 2-Dl  0.3 mg/0.3ml  use as directed 
Cetirizine HCL  10 mg  1 by mouth every day Ventolin HFA  108(90 Base) mcg/Act  
2 inhalations q4h prn coughing or wheezing Fluticasone Propionate  50 mcg/Act  
two sprays in each nostril once daily Singulair  10 mg  take 1 tablet by mouth 
every day needs annual Spiriva Respimat  2.5 mcg/Act  2 inhalations once daily  
Use spacer for MDI inhalers- Instruction/demonstration given  Call office or go 
to ED if condition worsens.  F/U next Tuesday with Teresa Rojas NP



Functional Status







 Description

 

 No Information Available







Mental Status







 Description

 

 No Information Available







Referrals







 Description

 

 No Information Available

## 2020-02-27 NOTE — XMS REPORT
Continuity of Care Document (CCD)

 Created on:2020



Patient:Teri Heaton

Sex:Female

:1950

External Reference #:[id number]





Demographics







 Address  [address] [place] [address] [address]



   [place] [address]

 

 Home Phone  [phone]

 

 Mobile Phone  [phone]

 

 Phone  [phone]

 

 Email Address  [email or web address]"

 

 Preferred Language  en

 

 Marital Status  Not  or 

 

 Church Affiliation  Unknown

 

 Race  White

 

 Ethnic Group  Not  or 









Author







 Name  FRANKLIN Mariscal (transmitted by agent of provider Nayana Velarde)

 

 Address  [address] [place] [address]









Care Team Providers







 Name  Role  Phone

 

 Nicole Murdock MD  Care Team Information   [phone]









Problems







 Active Problems  Provider  Date

 

 Allergic asthma without status asthmaticus  LAW White  Onset: 

 

 Allergic rhinitis  LAW White  Onset: 2014

 

 Allergic rhinitis due to pollen  LAW White  Onset: 2014

 

 Acute maxillary sinusitis  LAW White  Onset: 2014

 

 Uncomplicated moderate persistent asthma  August Castillo M.D.  Onset: 2017

 

 Cough  August Castillo M.D.  Onset: 10/11/2018







Social History







 Type  Date  Description  Comments

 

 Birth Sex    Unknown  

 

 ETOH Use    Currently consumes  1 glass of wine per



     alcohol  day

 

 Tobacco Use  Start: Unknown End:  Patient is a former  quit 26 years ago;



   Unknown  smoker  h/o tobacco x approx



       25 years (3ppd at



       time of quitting).

 

 Recreational Drug Use    Denies Drug Use  

 

 Smoking Status  Reviewed: 18  Patient is a former  quit 26 years ago;



     smoker  h/o tobacco x approx



       25 years (3ppd at



       time of quitting).







Allergies, Adverse Reactions, Alerts







 Active Allergies  Reaction  Severity  Comments  Date

 

 Feldene      blisters  2008

 

 Penicillin  Urticaria      2008







Medications







 Active Medications  SIG  Qnty  Indications  Ordering  Date



         Provider  

 

 Advair HFA  inhale two puffs by  12gm    Vanita  2020



   mouth twice a day      LYDIA AlcazarP-C  



 230-21mcg/Act Aerosol          



           

 

 Ipratropium Bromide  use with nebulizer  75ml    Vanita  2020



   am&pm      Inge FNP-C  



 0.02% Solution          



           

 

 Xolair  inject 225mg every  6units    Vanita  2020



      75mg/0.5ML Soln  2 weeks      LYDIA AlcazarP-C  



 Prefill Syringe          



           

 

 Azelastine HCL  Spray One Spray  30units    Abigail Horowitz,  2019



 (Nasal)  Into Each Nostril      FNP-C  



       137mcg/Spray  Once In The Morning        



 Solution  And Once AT Night        



           

 

 Levalbuterol HCL  use via nebulizer  30ml    Vanita  2019



   every 4 -6 hours      Inge FNP-C  



 1.25mg/3ML Nebulizer  for shortness of        



   breath, cough or        



   wheezing        

 

 Sodium Chloride  Mix 1 with 1 vial  90ml    Vanita  2019



               0.9%  of levalbuterol      LYDIA AlcazarP-C  



 Nebulizer  solutions vial via        



   nebulizer as needed        

 

 Epipen 2-Dl  use as directed  2units    Vanita  2019



         LYDIA AlcazarP-C  



 0.3mg/0.3ML Solution          



 Auto-Inject          



           

 

 Cetirizine HCL  1 by mouth every  30tabs    Vaishali Andino,  2018



              10mg  day      FNP-C  



 Tablets          



           

 

 Ventolin HFA  2 inhalations q4h  1units  J45.40  August Castillo,  2015



   prn coughing or      M.D.  



 108(90Base) mcg/Act  wheezing        



 Aerosol          



           

 

 Albuterol Sulfate  #1 via nebulizer  90ml  J45.40  Vanita  2015



   every 4-6 hours as      Inge FNP-C  



 (2.5mg/3ML) 0.083%  needed for cough,        



 Nebulizer  shortness of breath        



   and wheezing        

 

 Fluticasone  two sprays in each  1units    August Castillo,  2013



 Propionate  nostril once daily      M.D.  



          50mcg/Act          



 Suspension          



           

 

 Spiriva Respimat  2 inhalations once  1units  J45.40  Vanita  



   daily      Daviddrkarie FNP-C  



 2.5mcg/Act Aerosol          



           

 

 Furosemide  1 tabs every day      Unknown  



          80mg Tablets          



           

 

 Spironolactone  1/2 tablet daily      Unknown  



              25mg          



 Tablets          



           

 

 Pantoprazole Sodium  1 tab daily.      Unknown  



           



 40mg Tablets DR Hester M10  1 tab every day      Unknown  



            10Meq          



 Tablets ER          



           

 

 Irbesartan  1 tab every day      Unknown  



          75mg Tablets          



           

 

 Aspirin Low Dose  1 every day      Unknown  



                81mg          



 Chewtabs          



           

 

 Atorvastatin Calcium  1 every day      Unknown  



           



 Powder          



           

 

 Allopurinol  twice a day      Unknown  



           100mg          



 Tablets          



           

 

 Singulair  take 1 tablet by  30tabs    Vanita  



         10mg Tablets  mouth every day      MARIAN Alcazar-C  



   needs annual        

 

 Levothyroxine Sodium  1 tab daily.      Unknown  



           



 125mcg Tablets          



           







Medications Administered in Office







 Medication  SIG  Qnty  Indications  Ordering Provider  Date

 

 Biologic Agent        LYDIA MariscalP-C  2020



 Administration          



        Injection          



           

 

 Biologic Agent        Vanita Alcazar FNP-C  2020



 Administration          



        Injection          



           

 

 Therapeutic, Prophylactic Or        Nayana Velarde M.D.  2020



 Diagnostic Injection Subq/Im          



           



 Injection          

 

 Biologic Agent        Vanita Alcazar FNP-C  2019



 Administration          



        Injection          



           

 

 Therapeutic, Prophylactic Or        Vanita Inge, FNP-C  2019



 Diagnostic Injection Subq/Im          



           



 Injection          

 

 Biologic Agent        Vanita Alcazar FNP-C  2019



 Administration          



        Injection          



           

 

 Therapeutic, Prophylactic Or        Vanita Inge FNP-C  2019



 Diagnostic Injection Subq/Im          



           



 Injection          

 

 Biologic Agent        Vanita Inge, FNP-C  2019



 Administration          



        Injection          



           

 

 Therapeutic, Prophylactic Or        Vanita Ulbasia, FNP-C  2019



 Diagnostic Injection Subq/Im          



           



 Injection          

 

 Biologic Agent        Vanita Inge, FNP-C  2019



 Administration          



        Injection          



           

 

 Therapeutic, Prophylactic Or        Vanita Ulbasia, FNP-C  2019



 Diagnostic Injection Subq/Im          



           



 Injection          

 

 Biologic Agent        Vanita Inge, FNP-C  10/23/2019



 Administration          



        Injection          



           

 

 Therapeutic, Prophylactic Or        Vanita Ulbasia, FNP-C  10/23/2019



 Diagnostic Injection Subq/Im          



           



 Injection          

 

 Biologic Agent        Vanita Uldrich, FNP-C  10/09/2019



 Administration          



        Injection          



           

 

 Therapeutic, Prophylactic Or        Vanita Uldrich, FNP-C  10/09/2019



 Diagnostic Injection Subq/Im          



           



 Injection          

 

 Therapeutic, Prophylactic Or        Vanita Uldrich, FNP-C  2019



 Diagnostic Injection Subq/Im          



           



 Injection          

 

 Biologic Agent        Vanita Uldrich, FNP-C  2019



 Administration          



        Injection          



           

 

 Therapeutic, Prophylactic Or        Vanita Uldrich, FNP-C  2019



 Diagnostic Injection Subq/Im          



           



 Injection          

 

 Biologic Agent        Vanita Uldrich, FNP-C  2019



 Administration          



        Injection          



           

 

 Therapeutic, Prophylactic Or        Vanita Uldrich, FNP-C  2019



 Diagnostic Injection Subq/Im          



           



 Injection          

 

 Biologic Agent        Vanita Uldrich, FNP-C  2019



 Administration          



        Injection          



           

 

 Therapeutic, Prophylactic Or        Vanita Uldrich, FNP-C  2019



 Diagnostic Injection Subq/Im          



           



 Injection          

 

 Biologic Agent        Vanita Uldrich, FNP-C  2019



 Administration          



        Injection          



           

 

 Therapeutic, Prophylactic Or        Vanita Uldrich, FNP-C  2019



 Diagnostic Injection Subq/Im          



           



 Injection          

 

 Biologic Agent        Vanita Uldrich, FNP-C  2019



 Administration          



        Injection          



           

 

 Therapeutic, Prophylactic Or        Vanita Uldrich, FNP-C  2019



 Diagnostic Injection Subq/Im          



           



 Injection          

 

 Therapeutic, Prophylactic Or        Vanita Uldrich, FNP-C  2019



 Diagnostic Injection Subq/Im          



           



 Injection          

 

 Biologic Agent        Vanita Uldrich, FNP-C  2019



 Administration          



        Injection          



           

 

 Therapeutic, Prophylactic Or        Nayana Velarde M.D.  2019



 Diagnostic Injection Subq/Im          



           



 Injection          

 

 Biologic Agent        Vanita Uldrich, FNP-C  2019



 Administration          



        Injection          



           

 

 Therapeutic, Prophylactic Or        Vanita Uldrich, FNP-C  2019



 Diagnostic Injection Subq/Im          



           



 Injection          

 

 Biologic Agent        Vanita Uldrich, FNP-C  2019



 Administration          



        Injection          



           

 

 Therapeutic, Prophylactic Or        Vanita Uldrich, FNP-C  2019



 Diagnostic Injection Subq/Im          



           



 Injection          

 

 Biologic Agent        Vanita Uldrich, FNP-C  2019



 Administration          



        Injection          



           

 

 Therapeutic, Prophylactic Or        Vanita Uldrich, FNP-C  2019



 Diagnostic Injection Subq/Im          



           



 Injection          

 

 Biologic Agent        Vanita Uldrich, FNP-C  04/10/2019



 Administration          



        Injection          



           

 

 Therapeutic, Prophylactic Or        Vanita Uldrich, FNP-C  04/10/2019



 Diagnostic Injection Subq/Im          



           



 Injection          

 

 Biologic Agent        Vanita Uldrich, FNP-C  2019



 Administration          



        Injection          



           

 

 Therapeutic, Prophylactic Or        Vanita Uldrich, FNP-C  2019



 Diagnostic Injection Subq/Im          



           



 Injection          

 

 Biologic Agent        Kavita Raul, FNP-C  2019



 Administration          



        Injection          



           

 

 Therapeutic, Prophylactic Or        Kavita Raul, FNP-C  2019



 Diagnostic Injection Subq/Im          



           



 Injection          

 

 Biologic Agent        Vanita Uldrich, FNP-C  2019



 Administration          



        Injection          



           

 

 Therapeutic, Prophylactic Or        Vanita Uldrich, FNP-C  2019



 Diagnostic Injection Subq/Im          



           



 Injection          

 

 Biologic Agent        Vanita Uldrich, FNP-C  2019



 Administration          



        Injection          



           

 

 Therapeutic, Prophylactic Or        August Castillo M.D.  2019



 Diagnostic Injection Subq/Im          



           



 Injection          

 

 Biologic Agent        Vanita Uldrich, FNP-C  2019



 Administration          



        Injection          



           

 

 Therapeutic, Prophylactic Or        Nayana Velarde M.D.  2019



 Diagnostic Injection Subq/Im          



           



 Injection          

 

 Biologic Agent        Vanita Uldrich, FNP-C  2019



 Administration          



        Injection          



           

 

 Therapeutic, Prophylactic Or        Vanita Uldrich, FNP-C  2019



 Diagnostic Injection Subq/Im          



           



 Injection          

 

 Celestone/Cortisone        Tere Alvarado,  2015



 [id number] 1 cc        PH.D, RPA-C  



          Injection          



           

 

 Celestone/Cortisone        Tere Alvarado,  2015



 [id number] 1 cc        PH.D, RPA-C  



          Injection          



           

 

 Injection        Elliot Rubinstein, M.D.  2013



   Injection          

 

 Injection        Allergy Injection  2013



   Injection          

 

 Injection        Allergy Injection  2013



   Injection          

 

 Injection        Allergy Injection  2013



   Injection          

 

 Injection        Robbi Rubinstein, M.D.  2013



   Injection          

 

 Injection        Allergy Injection  2013



   Injection          

 

 Injection        Robbi Rubinstein, M.D.  2013



   Injection          

 

 Injection        Allergy Injection  2013



   Injection          

 

 Injection        Elliot Rubinstein, M.D.  2013



   Injection          

 

 Injection        Robbi Rubinstein, M.D.  2013



   Injection          

 

 Injection        Elliot Rubinstein, M.D.  2013



   Injection          

 

 Injection        Elliot Rubinstein, M.D.  2013



   Injection          

 

 Injection        Nayana M JIMMY Velarde  2012



   Injection          

 

 Injection        Nayana M JIMMY Velarde  2012



   Injection          

 

 Injection        Nayana M JIMMY Velarde  2012



   Injection          

 

 Injection        Nayana M JIMMY Velarde  10/23/2012



   Injection          

 

 Injection        Nayana M JIMMY Velarde  10/02/2012



   Injection          

 

 Injection        Nayana Velarde M.D.  2012



   Injection          

 

 Injection        Nayana M JIMMY Velarde  2012



   Injection          

 

 Injection        Nayana M JIMMY Velarde  2012



   Injection          

 

 Injection        Nayana Velarde M.D.  2012



   Injection          

 

 Injection        Nayana M JIMMY Velarde  2012



   Injection          

 

 Injection        Nayana M JIMMY Velarde  07/10/2012



   Injection          

 

 Injection        Nayana M JIMMY Velarde  2012



   Injection          

 

 Injection        Nayana Velarde M.D.  2012



   Injection          

 

 Injection        Nayana Velarde M.D.  2012



   Injection          

 

 Injection        Nayana GILBERT Velarde M.D.  05/15/2012



   Injection          

 

 Injection        Nayana M JIMMY Velarde  2012



   Injection          

 

 Injection        Nayana Velarde M.D.  2012



   Injection          

 

 Injection        Nayana Velarde M.D.  04/10/2012



   Injection          

 

 Injection        Nayana Velarde M.D.  2012



   Injection          

 

 Injection        Nayana Velarde M.D.  2012



   Injection          

 

 Injection        Elliot Rubinstein, M.D.  2012



   Injection          

 

 Injection        Elliot Rubinstein, M.D.  2012



   Injection          

 

 Injection        August Castillo, M.DLiz  12/15/2011



   Injection          

 

 Injection        Robbi Rubinstein, M.DLiz  2011



   Injection          

 

 Injection        Robbi Rubinstein, M.DLiz  2011



   Injection          

 

 Injection        Robbi Rubinstein, M.DLiz  2011



   Injection          

 

 Injection        Robbi Rubinstein, M.DLiz  10/13/2011



   Injection          

 

 Injection        Robbi Rubinstein, M.DLiz  2011



   Injection          

 

 Injection        Robbi Rubinstein, M.DLiz  2011



   Injection          

 

 Injection        Robbi Rubinstein, M.DLiz  2011



   Injection          

 

 Injection        Robbi Rubinstein, M.DLiz  2011



   Injection          

 

 Injection        Robbi Rubinstein, M.DLiz  2011



   Injection          

 

 Injection        Robbi Rubinstein, M.DLiz  2011



   Injection          

 

 Injection        Robbi Rubinstein, M.DLiz  2011



   Injection          

 

 Injection        Robbi Rubinstein, M.DiLz  2011



   Injection          

 

 Injection        Robbi Rubinstein, M.DLiz  2011



   Injection          

 

 Injection        Robbi Rubinstein, M.DLiz  2011



   Injection          

 

 Injection        Robbi Rubinstein, M.DLiz  2011



   Injection          

 

 Injection        Robbi Rubinstein, M.DLiz  2011



   Injection          

 

 Injection        Robbi Rubinstein, M.DLiz  2011



   Injection          

 

 Injection        Robbi Rubinstein, M.D.  2011



   Injection          

 

 Injection        Robbi Rubinstein, M.DLiz  2011



   Injection          

 

 Injection        Robbi Rubinstein, M.DLiz  2010



   Injection          

 

 Injection        Robbi Rubinstein, M.DLiz  2010



   Injection          

 

 Injection        Robbi Rubinstein, M.DLiz  10/12/2010



   Injection          

 

 Injection        Robbi Rubinstein, M.DLiz  2010



   Injection          

 

 Injection        Robbi Rubinstein, M.DLiz  2010



   Injection          

 

 Injection        Robbi Rubinstein, M.DLiz  2010



   Injection          

 

 Injection        Robbi Rubinstein, M.DLiz  08/10/2010



   Injection          

 

 Injection        Nayana Velarde M.D.  2010



   Injection          

 

 Injection        Robbi Rubinstein, M.D.  2010



   Injection          

 

 Injection        Robbi Rubinstein, M.DLiz  2010



   Injection          

 

 Injection        Robbi Rubinstein, M.DLiz  06/15/2010



   Injection          

 

 Injection        Robbi Rubinstein, M.D.  2010



   Injection          

 

 Injection        Robbi Rubinstein, M.D.  2010



   Injection          

 

 Injection        Robbi Rubinstein, M.DLiz  2010



   Injection          

 

 Injection        Robbi Rubinstein, M.DLiz  2010



   Injection          

 

 Injection        Robbi Rubinstein, M.DLiz  2010



   Injection          

 

 Injection        Robbi Rubinstein, M.DLiz  2010



   Injection          

 

 Injection        Robbi Rubinstein, M.DLiz  2010



   Injection          

 

 Injection        Robbi Rubinstein, M.DLiz  2010



   Injection          

 

 Injection        Nayana Velarde, M.D.  2010



   Injection          

 

 Injection        Curt Sánchez,  2010



   Injection        M.DLiz  

 

 Injection        Robbi Rubinstein, GISELADLiz  2010



   Injection          

 

 Injection        Robbi Rubinstein, GILBERT.MOOSE  2009



   Injection          

 

 Injection        Curt Sánchez,  12/10/2009



   Injection        M.D.  

 

 Injection        Robbi Rubinstein, M.DLiz  2009



   Injection          

 

 Injection        Robbi Rubinstein, M.DLiz  11/10/2009



   Injection          

 

 Injection        Robbi Rubinstein, M.DLiz  10/27/2009



   Injection          

 

 Injection        Robbi Rubinstein, M.DLiz  10/08/2009



   Injection          

 

 Injection        Robbi Rubinstein, M.DLiz  2009



   Injection          

 

 Injection        Robbi Rubinstein, M.DLiz  09/10/2009



   Injection          

 

 Injection        Robbi Rubinstein, M.DLiz  2009



   Injection          

 

 Injection        Robbi Rubinstein, M.DLiz  2009



   Injection          

 

 Injection        Robbi Rubinstein, M.DLiz  2009



   Injection          

 

 Injection        Robbi Rubinstein, M.DLiz  2009



   Injection          

 

 Injection        Robbi Rubinstein, M.DLiz  2009



   Injection          

 

 Injection        Curt Sánchez,  2009



   Injection        M.D.  

 

 Injection        Robbi Rubinstein, M.DLiz  2009



   Injection          

 

 Injection        Robbi Rubinstein, M.DLiz  2009



   Injection          

 

 Injection        Robbi Rubinstein, GILBERT.DLiz  2009



   Injection          

 

 Injection        Robbi Rubinstein, M.DLiz  2009



   Injection          

 

 Injection        Robbi Rubinstein, M.DLiz  2009



   Injection          

 

 Injection        Robbi Rubinstein, M.DLiz  2009



   Injection          

 

 Injection        Robbi Rubinstein, GILBERT.DLiz  2009



   Injection          

 

 Injection        Robbi Rubinstein, M.DLiz  2009



   Injection          

 

 Injection        Robbi Rubinstein, M.DLiz  2009



   Injection          

 

 Injection        Robbi Rubinstein, M.DLiz  2009



   Injection          

 

 Injection        Robbi Rubinstein, M.DLiz  2009



   Injection          

 

 Injection        Robbi Rubinstein, M.DLiz  2009



   Injection          

 

 Injection        Robbi Rubinstein, GISELADLiz  01/15/2009



   Injection          

 

 Injection        Robbi Rubinstein, GISELADLiz  2008



   Injection          

 

 Injection        Robbi Rubinstein, M.D.  2008



   Injection          

 

 Injection        Robbi Rubinstein, M.D.  2008



   Injection          

 

 Injection        Robbi Rubinstein, GISELADLiz  2008



   Injection          

 

 Injection        Robbi Rubinstein, GILBERT.DLiz  2008



   Injection          

 

 Injection        Robbi Rubinstein, M.D.  2008



   Injection          

 

 Injection        Robbi Rubinstein, M.D.  10/30/2008



   Injection          

 

 Injection        Robbi Rubinstein, GISELADLiz  10/23/2008



   Injection          

 

 Injection        Robbi Rubinstein, GISELADLiz  10/16/2008



   Injection          

 

 Injection        Robbi Rubinstein, M.D.  10/09/2008



   Injection          

 

 Injection        Robbi Rubinstein, GISELADLiz  10/02/2008



   Injection          

 

 Injection        Robbi Rubinstein, MLizDLiz  2008



   Injection          

 

 Injection        Robbi Rubinstein, M.DLiz  2008



   Injection          

 

 Injection        Robbi Rubinstein, GISELADLiz  2008



   Injection          

 

 Injection        Robbi Rubinstein, GISELADLiz  2008



   Injection          

 

 Injection        Robbi Rubinstein, GISELADLiz  2008



   Injection          

 

 Injection        Robbi Rubinstein, GISELADLiz  2008



   Injection          

 

 Injection        Robbi Rubinstein, M.D.  2008



   Injection          

 

 Injection        Robbi Rubinstein, M.D.  2008



   Injection          

 

 Injection        Elliot Rubinstein, M.D.  2008



   Injection          

 

 Injection        Elliot Rubinstein, M.D.  2008



   Injection          

 

 Injection        Elliot Rubinstein, M.D.  07/15/2008



   Injection          

 

 Injection        Elliot Rubinstein, M.D.  07/10/2008



   Injection          

 

 Injection        Elliot Rubinstein, M.D.  2008



   Injection          

 

 Injection        Elliot Rubinstein, M.D.  2008



   Injection          

 

 Injection        Elliot Rubinstein, M.D.  2008



   Injection          

 

 Injection        Elliot Rubinstein, M.D.  2008



   Injection          

 

 Injection        Elliot Rubinstein, M.D.  2008



   Injection          

 

 Injection        Elliot Rubinstein, M.D.  2008



   Injection          

 

 Injection        Elliot Rubinstein, M.D.  2008



   Injection          

 

 Injection        Elliot Rubinstein, M.D.  2008



   Injection          

 

 Injection        Elliot Rubinstein, M.D.  2008



   Injection          

 

 Injection        Elliot Rubinstein, M.D.  2008



   Injection          







Immunizations







 CPT Code  Status  Date  Vaccine  Lot #

 

 [id number]  Given  10/01/2013  Influenza Vaccine  

 

 98780  Given  2011  Pneumococcal Vaccine  

 

 64966  Given  Unknown  Pneumococcal Vaccine  

 

 09644  Given  Unknown  Influenza Vaccine  

 

 12559  Given  Unknown  Influenza Vaccine  

 

 24384  Given  Unknown  Influenza Vaccine  

 

 88006  Given  Unknown  Influenza Vaccine  

 

 64551  Given  Unknown  Influenza Vaccine  







Vital Signs







 Date  Vital  Result  Comment

 

 2020 11:28am  Height  65 inches  5'5"









 Weight  241.00 lb  

 

 Weight  109.318 kg  

 

 Respiratory Rate  16 /min  

 

 Heart Rate  88 /min  

 

 O2 % BldC Oximetry  98 %  

 

 BP Systolic  132 mmHg  

 

 BP Diastolic  62 mmHg  

 

 BMI (Body Mass Index)  40.1 kg/m2  









 2020 11:18am  Height  65 inches  5'5"









 Weight  237.00 lb  

 

 Weight  107.503 kg  

 

 Respiratory Rate  20 /min  

 

 Heart Rate  102 /min  

 

 O2 % BldC Oximetry  95 %  

 

 BP Systolic  124 mmHg  

 

 BP Diastolic  68 mmHg  

 

 BMI (Body Mass Index)  39.4 kg/m2  







Results







 Description

 

 No Information Available







Procedures







 Date  Code  Description  Status

 

 2020  75563  Biologic Agent Administration  Completed

 

 2020  60650  Biologic Agent Administration  Completed

 

 2020  40157  Therapeutic, Prophylactic Or Diagnostic Injection Subq/Im  
Completed

 

 2020  23726  Ippb  Completed

 

 2019  49874  Biologic Agent Administration  Completed

 

 2019  23069  Therapeutic, Prophylactic Or Diagnostic Injection Subq/Im  
Completed

 

 2019  26917  Biologic Agent Administration  Completed

 

 2019  60993  Therapeutic, Prophylactic Or Diagnostic Injection Subq/Im  
Completed

 

 2019  48304  Ippb  Completed

 

 2019  81294  Biologic Agent Administration  Completed

 

 2019  56581  Therapeutic, Prophylactic Or Diagnostic Injection Subq/Im  
Completed

 

 2019  40991  Therapeutic, Prophylactic Or Diagnostic Injection Subq/Im  
Completed

 

 2019  58795  Biologic Agent Administration  Completed

 

 10/23/2019  95197  Biologic Agent Administration  Completed

 

 10/23/2019  30918  Therapeutic, Prophylactic Or Diagnostic Injection Subq/Im  
Completed

 

 10/09/2019  24068  Biologic Agent Administration  Completed

 

 10/09/2019  05461  Therapeutic, Prophylactic Or Diagnostic Injection Subq/Im  
Completed

 

 2019  48725  Therapeutic, Prophylactic Or Diagnostic Injection Subq/Im  
Completed

 

 2019  03770  Biologic Agent Administration  Completed

 

 2019  14818  Therapeutic, Prophylactic Or Diagnostic Injection Subq/Im  
Completed

 

 2019  72444  Biologic Agent Administration  Completed

 

 2019  33170  Therapeutic, Prophylactic Or Diagnostic Injection Subq/Im  
Completed

 

 2019  65125  Biologic Agent Administration  Completed

 

 2019  68479  Therapeutic, Prophylactic Or Diagnostic Injection Subq/Im  
Completed







Medical Devices







 Description

 

 No Information Available







Encounters







 Type  Date  Location  Provider  Dx  Diagnosis

 

 Office Visit  2019  [facility]  Vanita Alczaar  J45.50  Severe 
persistent



   11:20a    FNP-C    asthma, uncomplicated









 J30.1  Allergic rhinitis due to pollen

 

 J45.50  Severe persistent asthma, uncomplicated

 

 J30.2  Other seasonal allergic rhinitis

 

 J30.81  Allergic rhinitis due to animal (cat) (dog) hair and dander

 

 J30.1  Allergic rhinitis due to pollen

 

 J30.2  Other seasonal allergic rhinitis

 

 J30.81  Allergic rhinitis due to animal (cat) (dog) hair and dander

 

 J30.89  Other allergic rhinitis







Assessments







 Date  Code  Description  Provider

 

 2020  J45.50  Severe persistent asthma, uncomplicated  Nayana Velarde M.D.

 

 2020  J45.50  Severe persistent asthma, uncomplicated  Vanita Uldrich, 
FNP-C

 

 2020  J30.2  Other seasonal allergic rhinitis  Nayana Velarde M.D.

 

 2020  J30.2  Other seasonal allergic rhinitis  Vanita Uldrich, FNP-C

 

 2020  J30.81  Allergic rhinitis due to animal (cat) (dog)  Nayana Velarde M.D.



     hair and dander  

 

 2020  J30.81  Allergic rhinitis due to animal (cat) (dog)  Vanita 
Uldrich, FNP-C



     hair and dander  

 

 2020  J30.89  Other allergic rhinitis  Nayana Velarde M.D.

 

 2020  J30.89  Other allergic rhinitis  Vanita Uldrich, FNP-C

 

 2020  J30.1  Allergic rhinitis due to pollen  Vanita Uldrich, FNP-C

 

 2020  J45.50  Severe persistent asthma, uncomplicated  Nayana Velarde M.D.

 

 2020  J45.50  Severe persistent asthma, uncomplicated  Vanita Uldrich, 
FNP-C

 

 2020  J30.2  Other seasonal allergic rhinitis  Nayana Velarde M.D.

 

 2020  J30.2  Other seasonal allergic rhinitis  Vanita Uldrich, FNP-C

 

 2020  J30.81  Allergic rhinitis due to animal (cat) (dog)  Nayana Velarde M.D.



     hair and dander  

 

 2020  J30.81  Allergic rhinitis due to animal (cat) (dog)  Vanita 
Uldrich, FNP-C



     hair and dander  

 

 2020  J30.89  Other allergic rhinitis  Nayana Velarde M.D.

 

 2020  J30.89  Other allergic rhinitis  Vanita Uldrich, FNP-C

 

 2020  J30.1  Allergic rhinitis due to pollen  Vanita Uldrich, FNP-C

 

 2019  J45.50  Severe persistent asthma, uncomplicated  Vanita Uldrich, 
FNP-C

 

 2019  J30.2  Other seasonal allergic rhinitis  Nayana Velarde M.D.

 

 2019  J30.2  Other seasonal allergic rhinitis  Vanita Uldrich, FNP-C

 

 2019  J30.81  Allergic rhinitis due to animal (cat) (dog)  Nayana Velarde M.D.



     hair and dander  

 

 2019  J30.81  Allergic rhinitis due to animal (cat) (dog)  Vanita 
Uldrich, FNP-C



     hair and dander  

 

 2019  J30.89  Other allergic rhinitis  Nayana Velarde M.D.

 

 2019  J30.89  Other allergic rhinitis  Vanita Uldrich, FNP-C

 

 2019  J30.1  Allergic rhinitis due to pollen  Nayana Velarde M.D.

 

 2019  J30.1  Allergic rhinitis due to pollen  Vanita Uldrich, FNP-C

 

 2019  J45.50  Severe persistent asthma, uncomplicated  Vanita Uldrich, 
FNP-C

 

 2019  J45.50  Severe persistent asthma, uncomplicated  Nayana Velarde M.D.

 

 2019  J30.2  Other seasonal allergic rhinitis  Vanita Uldrich, FNP-C

 

 2019  J45.50  Severe persistent asthma, uncomplicated  Vanita Uldrich, 
FNP-C

 

 2019  J30.81  Allergic rhinitis due to animal (cat) (dog)  Vanita 
Uldrich, FNP-C



     hair and dander  

 

 2019  J30.2  Other seasonal allergic rhinitis  Nayana Velarde M.D.

 

 2019  J30.89  Other allergic rhinitis  Vanita Uldrich, FNP-C

 

 2019  J30.2  Other seasonal allergic rhinitis  Vanita Uldrich, FNP-C

 

 2019  J30.81  Allergic rhinitis due to animal (cat) (dog)  Nayana Velarde M.D.



     hair and dander  

 

 2019  J30.81  Allergic rhinitis due to animal (cat) (dog)  Vanita 
Uldrich, FNP-C



     hair and dander  

 

 2019  J30.89  Other allergic rhinitis  Nayana Velarde M.D.

 

 2019  J30.89  Other allergic rhinitis  Vanita Uldrich, FNP-C

 

 2019  J30.1  Allergic rhinitis due to pollen  Vanita Uldrich, FNP-C

 

 2019  J45.50  Severe persistent asthma, uncomplicated  Vanita Uldrich, 
FNP-C

 

 2019  J45.50  Severe persistent asthma, uncomplicated  Nayana Velarde M.D.

 

 2019  J45.50  Severe persistent asthma, uncomplicated  Vanita Uldrich, 
FNP-C

 

 2019  J30.2  Other seasonal allergic rhinitis  Nayana Velarde M.D.

 

 2019  J30.2  Other seasonal allergic rhinitis  Vanita Uldrich, FNP-C

 

 2019  J30.81  Allergic rhinitis due to animal (cat) (dog)  Nayana Velarde M.D.



     hair and dander  

 

 2019  J30.81  Allergic rhinitis due to animal (cat) (dog)  Vanita 
Uldrich, FNP-C



     hair and dander  

 

 2019  J30.89  Other allergic rhinitis  Nayana Velarde M.D.

 

 2019  J30.89  Other allergic rhinitis  Vanita Uldrich, FNP-C

 

 2019  J30.1  Allergic rhinitis due to pollen  Vanita Uldrich, FNP-C

 

 2019  J45.50  Severe persistent asthma, uncomplicated  Vanita Uldrich, 
FNP-C

 

 2019  J45.50  Severe persistent asthma, uncomplicated  Nayana Velarde M.D.

 

 2019  J45.50  Severe persistent asthma, uncomplicated  Vanita Uldrich, 
FNP-C

 

 2019  J30.2  Other seasonal allergic rhinitis  Nayana Velarde M.D.

 

 2019  J30.2  Other seasonal allergic rhinitis  Vanita Uldrich, FNP-C

 

 2019  J30.81  Allergic rhinitis due to animal (cat) (dog)  Nayana Velarde M.D.



     hair and dander  

 

 2019  J30.81  Allergic rhinitis due to animal (cat) (dog)  Vanita 
Uldrich, FNP-C



     hair and dander  

 

 2019  J30.89  Other allergic rhinitis  Nayana Velarde M.D.

 

 2019  J30.89  Other allergic rhinitis  Vanita Uldrich, FNP-C

 

 2019  J30.1  Allergic rhinitis due to pollen  Vanita Uldrich, FNP-C

 

 10/23/2019  J45.50  Severe persistent asthma, uncomplicated  Vanita Uldrich, 
FNP-C

 

 10/23/2019  J30.2  Other seasonal allergic rhinitis  Vanita Uldrich, P-C

 

 10/23/2019  J30.81  Allergic rhinitis due to animal (cat) (dog)  Vanita 
Uldrich, [facility]-C



     hair and dander  

 

 10/23/2019  J30.89  Other allergic rhinitis  Vanita Uldrich, P-C

 

 10/23/2019  J30.1  Allergic rhinitis due to pollen  Vanita Uldrich, FNP-C

 

 10/09/2019  J45.50  Severe persistent asthma, uncomplicated  Vanita Uldrich, 
FNP-C

 

 10/09/2019  J45.50  Severe persistent asthma, uncomplicated  Nayana Velarde M.D.

 

 10/09/2019  J45.50  Severe persistent asthma, uncomplicated  Vanita Uldrich, 
FNP-C

 

 10/09/2019  J30.2  Other seasonal allergic rhinitis  Nayana Velarde M.D.

 

 10/09/2019  J30.2  Other seasonal allergic rhinitis  [facility]

 

 10/09/2019  J30.81  Allergic rhinitis due to animal (cat) (dog)  Nayana Velarde M.D.



     hair and dander  

 

 10/09/2019  J30.81  Allergic rhinitis due to animal (cat) (dog)  [facility] 
[facility]



     hair and dander  

 

 10/09/2019  J30.89  Other allergic rhinitis  Nayana Velarde M.D.

 

 10/09/2019  J30.89  Other allergic rhinitis  Vanita Uldrich, FNP-C

 

 2019  J45.50  Severe persistent asthma, uncomplicated  Vanita Uldrich, 
FNP-C

 

 2019  J45.50  Severe persistent asthma, uncomplicated  Nayana Velarde M.D.

 

 2019  J45.50  Severe persistent asthma, uncomplicated  Vanita Uldrich, 
FNP-C

 

 2019  J30.1  Allergic rhinitis due to pollen  Nayana Velarde M.D.

 

 2019  J30.1  Allergic rhinitis due to pollen  Vanita Ulich, FNP-C

 

 2019  J30.2  Other seasonal allergic rhinitis  Nayana Velarde M.D.

 

 2019  J30.2  Other seasonal allergic rhinitis  Vanita Uldrich, FNP-C

 

 2019  J30.81  Allergic rhinitis due to animal (cat) (dog)  Nayana Velarde M.D.



     hair and dander  

 

 2019  J30.81  Allergic rhinitis due to animal (cat) (dog)  Vanita 
Uldrich, FNP-C



     hair and dander  

 

 2019  J30.89  Other allergic rhinitis  Vanita Uldrich, FNP-C

 

 2019  J45.50  Severe persistent asthma, uncomplicated  Vanita Uldrich, 
FNP-C

 

 2019  J45.50  Severe persistent asthma, uncomplicated  Nayana Velarde M.D.

 

 2019  J45.50  Severe persistent asthma, uncomplicated  Vanita Uldrich, 
FNP-C

 

 2019  J30.1  Allergic rhinitis due to pollen  Nayana Velarde M.D.

 

 2019  J30.1  Allergic rhinitis due to pollen  Vanita Ulich, FNP-C

 

 2019  J30.2  Other seasonal allergic rhinitis  Nayana Velarde M.D.

 

 2019  J30.2  Other seasonal allergic rhinitis  Vanita Uldrich, FNP-C

 

 2019  J30.81  Allergic rhinitis due to animal (cat) (dog)  Nayana Velarde M.D.



     hair and dander  

 

 2019  J30.81  Allergic rhinitis due to animal (cat) (dog)  Vanita 
Uldrich, FNP-C



     hair and dander  

 

 2019  J30.89  Other allergic rhinitis  Nayana Velarde M.D.

 

 2019  J30.89  Other allergic rhinitis  Vanita Uldrich, FNP-C

 

 2019  J45.50  Severe persistent asthma, uncomplicated  Vanita Uldrich, 
FNP-C

 

 2019  J45.50  Severe persistent asthma, uncomplicated  Nayana Velarde M.D.

 

 2019  J30.1  Allergic rhinitis due to pollen  Vanita Uldrich, FNP-C

 

 2019  J45.50  Severe persistent asthma, uncomplicated  Vanita Uldrich, 
FNP-C

 

 2019  J30.2  Other seasonal allergic rhinitis  Vanita Uldrich, FNP-C

 

 2019  J30.1  Allergic rhinitis due to pollen  Nayana Velarde M.D.

 

 2019  J30.81  Allergic rhinitis due to animal (cat) (dog)  Vanita 
Uldrich, FNP-C



     hair and dander  

 

 2019  J30.1  Allergic rhinitis due to pollen  Vanita Uldrich, FNP-C

 

 2019  J30.2  Other seasonal allergic rhinitis  Nayana Velarde M.D.

 

 2019  J30.2  Other seasonal allergic rhinitis  Vanita Uldrich, FNP-C

 

 2019  J30.81  Allergic rhinitis due to animal (cat) (dog)  Nayana Velarde M.D.



     hair and dander  

 

 2019  J30.81  Allergic rhinitis due to animal (cat) (dog)  Vanita 
Uldrich, FNP-C



     hair and dander  

 

 2019  J30.89  Other allergic rhinitis  Vanita Uldrich, FNP-C

 

 2019  J45.50  Severe persistent asthma, uncomplicated  Nayana Velarde M.D.

 

 2019  J45.50  Severe persistent asthma, uncomplicated  Vanita Uldrich, 
FNP-C

 

 2019  J30.1  Allergic rhinitis due to pollen  Nayana Velarde M.D.

 

 2019  J30.1  Allergic rhinitis due to pollen  Vanita Uldrich, FNP-C

 

 2019  J30.2  Other seasonal allergic rhinitis  Vanita Uldrich, FNP-C

 

 2019  J30.81  Allergic rhinitis due to animal (cat) (dog)  Nayana Velarde M.D.



     hair and dander  

 

 2019  J30.81  Allergic rhinitis due to animal (cat) (dog)  FRANKLIN Mariscal



     hair and dander  

 

 2019  J30.89  Other allergic rhinitis  FRANKLIN Mariscal







Plan of Treatment

Future Appointment(s):2020 11:20 am - FRANKLIN Mariscal at [facility]2020 11:20 am - FRANKLIN Mariscal at [facility]2020 
- LION MariscalCJ45.50 Severe persistent asthma, vhqhppyopqzqwJ18.2 
Other seasonal allergic wwvkuknkJ57.81 Allergic rhinitis due to animal (cat) (
dog) hair and lpqswdQ60.89 Other allergic zuphdkcpO50.1 Allergic rhinitis due 
to pollenRecommendations:Continue all medications as prescribed.Refrain from 
wearing perfumes/scented colognes while visitingour office.   OK for Xolair( 
225 mg every 2 weeks) today   Continue the Dulera 2 puffs twice a day  Continue 
the Spiriva 1.25 mcg 2 puffs daily  Continue the Azelestine 2 sprays daily  
Continue the fluticasone nasal spray daily  Continue the cetirizine 1 daily  
Continue the montelukast 1 daily  Continue the Ventolin 2 puffs every 4 hours 
as needed for cough, shortness of breath, chest tightness, shortness of breath, 
wheezing, or chest congestion.  Xolair in 2 weeks



Functional Status







 Description

 

 No Information Available







Mental Status







 Description

 

 No Information Available







Referrals







 Description

 

 No Information Available

## 2020-02-27 NOTE — XMS REPORT
Continuity of Care Document (CCD)

 Created on:2020



Patient:Teri Heaton

Sex:Female

:1950

External Reference #:[id number]





Demographics







 Address  [address] [place] [address] [address]



   [place] [address]

 

 Home Phone  [phone]

 

 Mobile Phone  [phone]

 

 Phone  [phone]

 

 Email Address  [email or web address]

 

 Preferred Language  en

 

 Marital Status  Not  or 

 

 Confucianist Affiliation  Unknown

 

 Race  White

 

 Ethnic Group  Not  or 









Author







 Name  FRANKLIN Mariscal (transmitted by agent of provider Nayana Velarde)

 

 Address  [address] [place] [address]









Care Team Providers







 Name  Role  Phone

 

 Nicole Murdock MD  Care Team Information   [phone]









Problems







 Active Problems  Provider  Date

 

 Allergic asthma without status asthmaticus  LAW White  Onset: 

 

 Allergic rhinitis  LAW White  Onset: 2014

 

 Allergic rhinitis due to pollen  LAW White  Onset: 2014

 

 Acute maxillary sinusitis  LAW White  Onset: 2014

 

 Uncomplicated moderate persistent asthma  August Castillo M.D.  Onset: 2017

 

 Cough  August Castillo M.D.  Onset: 10/11/2018







Social History







 Type  Date  Description  Comments

 

 Birth Sex    Unknown  

 

 ETOH Use    Currently consumes  1 glass of wine per



     alcohol  day

 

 Tobacco Use  Start: Unknown End:  Patient is a former  quit 26 years ago;



   Unknown  smoker  h/o tobacco x approx



       25 years (3ppd at



       time of quitting).

 

 Recreational Drug Use    Denies Drug Use  

 

 Smoking Status  Reviewed: 18  Patient is a former  quit 26 years ago;



     smoker  h/o tobacco x approx



       25 years (3ppd at



       time of quitting).







Allergies, Adverse Reactions, Alerts







 Active Allergies  Reaction  Severity  Comments  Date

 

 Feldene      blisters  2008

 

 Penicillin  Urticaria      2008







Medications







 Active Medications  SIG  Qnty  Indications  Ordering  Date



         Provider  

 

 Prednisone  40mg for 3 days,  31tabs  J45.51  Vanita  2020



          10mg Tablets  30mg for 3 days,      LYDIA AlcazarP-C  



   20 mg for 3 days,        



   10mg for 3 days        



   and 5mg for 2 days        

 

 Azithromycin  1 by mouth x 7  7tabs  J45.51  Vanita  2020



            500mg  days      DaviddrLYDIA tiwariP-C  



 Tablets          



           

 

 Aerochamber Plus  for use with mdi  2units    Abigail Horowitz,  2020



 Josef-Vu        FNP-C  



       Misc          



           

 

 Advair HFA  inhale two puffs  12gm    Vanita  2020



   by mouth twice a      LYDIA AlcazarP-C  



 230-21mcg/Act Aerosol  day        



           

 

 Ipratropium Bromide  use with nebulizer  75ml    Vanita  2020



   am&pm      MARIAN Alcazar-C  



 0.02% Solution          



           

 

 Xolair  inject 225mg every  6units    Vanita  2020



      75mg/0.5ML Soln  2 weeks      MARIAN Alcazar-C  



 Prefill Syringe          



           

 

 Azelastine HCL  Spray One Spray  30units    Abigail Horowitz,  2019



 (Nasal)  Into Each Nostril      FNP-C  



       137mcg/Spray  Once In The        



 Solution  Morning And Once        



   AT Night        

 

 Levalbuterol HCL  use via nebulizer  30ml    Vanita  2019



   every 4 -6 hours      MARIAN Alcazar-C  



 1.25mg/3ML Nebulizer  for shortness of        



   breath, cough or        



   wheezing        

 

 Epipen 2-Dl  use as directed  2units    Vanita  2019



         MARIAN Alcazar-C  



 0.3mg/0.3ML Solution          



 Auto-Inject          



           

 

 Cetirizine HCL  1 by mouth every  30tabs    Vaishali Andino,  2018



              10mg  day      FNP-C  



 Tablets          



           

 

 Ventolin HFA  2 inhalations q4h  1units  J45.40  August Castillo,  2015



   prn coughing or      M.D.  



 108(90Base) mcg/Act  wheezing        



 Aerosol          



           

 

 Fluticasone  two sprays in each  1units    August Castillo,  2013



 Propionate  nostril once daily      M.D.  



          50mcg/Act          



 Suspension          



           

 

 Levothyroxine Sodium  1 tab daily.      Unknown  



           



 125mcg Tablets          



           

 

 Singulair  take 1 tablet by  30tabs    Vanita  



         10mg Tablets  mouth every day      FRANKLIN Alcazar  



   needs annual        

 

 Atorvastatin Calcium  1 every day      Unknown  



           



 Powder          



           

 

 Aspirin Low Dose  1 every day      Unknown  



                81mg          



 Chewtabs          



           

 

 Irbesartan  1 tab every day      Unknown  



          75mg Tablets          



           

 

 Klor-Con M10  1 tab every day      Unknown  



            10Meq          



 Tablets ER          



           

 

 Pantoprazole Sodium  1 tab daily.      Unknown  



           



 40mg Tablets DR          



           

 

 Spironolactone  1/2 tablet daily      Unknown  



              25mg          



 Tablets          



           

 

 Furosemide  1 tabs every day      Unknown  



          80mg Tablets          



           

 

 Spiriva Respimat  2 inhalations once  1units  J45.40  Vanita  



   daily      FRANKLIN Alcazar  



 2.5mcg/Act Aerosol          



           

 

 Allopurinol  once a day      Unknown  



           300mg          



 Tablets          



           







Medications Administered in Office







 Medication  SIG  Qnty  Indications  Ordering Provider  Date

 

 Biologic Agent        MARIAN Mariscal-C  2020



 Administration          



        Injection          



           

 

 Therapeutic, Prophylactic Or        Nayana Velarde M.D.  2020



 Diagnostic Injection Subq/Im          



           



 Injection          

 

 Biologic Agent        FRANKLIN Marino  2020



 Administration          



        Injection          



           

 

 Biologic Agent        MARIAN Mariscal-RAMOS  2020



 Administration          



        Injection          



           

 

 Therapeutic, Prophylactic Or        Nayana Velarde M.D.  2020



 Diagnostic Injection Subq/Im          



           



 Injection          

 

 Biologic Agent        MARIAN Mariscal-RAMOS  2020



 Administration          



        Injection          



           

 

 Therapeutic, Prophylactic Or        Nayana Velarde M.D.  2020



 Diagnostic Injection Subq/Im          



           



 Injection          

 

 Biologic Agent        MARIAN Mariscal-C  2019



 Administration          



        Injection          



           

 

 Therapeutic, Prophylactic Or        MARIAN Mariscal-C  2019



 Diagnostic Injection Subq/Im          



           



 Injection          

 

 Biologic Agent        LYDIA MariscalP-C  2019



 Administration          



        Injection          



           

 

 Therapeutic, Prophylactic Or        Vanitadariana Alcazar FNP-C  2019



 Diagnostic Injection Subq/Im          



           



 Injection          

 

 Biologic Agent        LYDIA MaricsalP-C  2019



 Administration          



        Injection          



           

 

 Therapeutic, Prophylactic Or        Vanita Sultanaich, FNP-C  2019



 Diagnostic Injection Subq/Im          



           



 Injection          

 

 Biologic Agent        Vanita Uldrich, FNP-C  2019



 Administration          



        Injection          



           

 

 Therapeutic, Prophylactic Or        Vanita Uldrich, FNP-C  2019



 Diagnostic Injection Subq/Im          



           



 Injection          

 

 Biologic Agent        Vanita Uldrich, FNP-C  10/23/2019



 Administration          



        Injection          



           

 

 Therapeutic, Prophylactic Or        Vanita Uldrich, FNP-C  10/23/2019



 Diagnostic Injection Subq/Im          



           



 Injection          

 

 Biologic Agent        Vanita Uldrich, FNP-C  10/09/2019



 Administration          



        Injection          



           

 

 Therapeutic, Prophylactic Or        Vanita Uldrich, FNP-C  10/09/2019



 Diagnostic Injection Subq/Im          



           



 Injection          

 

 Therapeutic, Prophylactic Or        Vanita Uldrich, FNP-C  2019



 Diagnostic Injection Subq/Im          



           



 Injection          

 

 Biologic Agent        Vanita Uldrich, FNP-C  2019



 Administration          



        Injection          



           

 

 Therapeutic, Prophylactic Or        Vanita Uldrich, FNP-C  2019



 Diagnostic Injection Subq/Im          



           



 Injection          

 

 Biologic Agent        Vanita Uldrich, FNP-C  2019



 Administration          



        Injection          



           

 

 Therapeutic, Prophylactic Or        Vanita Uldrich, FNP-C  2019



 Diagnostic Injection Subq/Im          



           



 Injection          

 

 Biologic Agent        Vanita Uldrich, FNP-C  2019



 Administration          



        Injection          



           

 

 Therapeutic, Prophylactic Or        Vanita Uldrich, FNP-C  2019



 Diagnostic Injection Subq/Im          



           



 Injection          

 

 Biologic Agent        Vanita Uldrich, FNP-C  2019



 Administration          



        Injection          



           

 

 Therapeutic, Prophylactic Or        Vanita Uldrich, FNP-C  2019



 Diagnostic Injection Subq/Im          



           



 Injection          

 

 Biologic Agent        Vanita Uldrich, FNP-C  2019



 Administration          



        Injection          



           

 

 Therapeutic, Prophylactic Or        Vanita Uldrich, FNP-C  2019



 Diagnostic Injection Subq/Im          



           



 Injection          

 

 Therapeutic, Prophylactic Or        Vanita Uldrich, FNP-C  2019



 Diagnostic Injection Subq/Im          



           



 Injection          

 

 Biologic Agent        Vanita Uldrich, FNP-C  2019



 Administration          



        Injection          



           

 

 Therapeutic, Prophylactic Or        Nayana Velarde M.D.  2019



 Diagnostic Injection Subq/Im          



           



 Injection          

 

 Biologic Agent        Vanita Uldrich, FNP-C  2019



 Administration          



        Injection          



           

 

 Therapeutic, Prophylactic Or        Vanita Uldrich, FNP-C  2019



 Diagnostic Injection Subq/Im          



           



 Injection          

 

 Biologic Agent        Vanita Uldrich, FNP-C  2019



 Administration          



        Injection          



           

 

 Therapeutic, Prophylactic Or        Vanita Uldrich, FNP-C  2019



 Diagnostic Injection Subq/Im          



           



 Injection          

 

 Biologic Agent        Vanita Uldrich, FNP-C  2019



 Administration          



        Injection          



           

 

 Therapeutic, Prophylactic Or        Vanita Uldrich, FNP-C  2019



 Diagnostic Injection Subq/Im          



           



 Injection          

 

 Biologic Agent        Vanita Uldrich, FNP-C  04/10/2019



 Administration          



        Injection          



           

 

 Therapeutic, Prophylactic Or        Vanita Uldrich, FNP-C  04/10/2019



 Diagnostic Injection Subq/Im          



           



 Injection          

 

 Biologic Agent        Vanita Uldrich, FNP-C  2019



 Administration          



        Injection          



           

 

 Therapeutic, Prophylactic Or        Vanita Uldrich, FNP-C  2019



 Diagnostic Injection Subq/Im          



           



 Injection          

 

 Biologic Agent        Kavita Raul, FNP-C  2019



 Administration          



        Injection          



           

 

 Therapeutic, Prophylactic Or        Kavita Raul, FNP-C  2019



 Diagnostic Injection Subq/Im          



           



 Injection          

 

 Biologic Agent        Vanita Uldrich, FNP-C  2019



 Administration          



        Injection          



           

 

 Therapeutic, Prophylactic Or        Vanita Uldrich, FNP-C  2019



 Diagnostic Injection Subq/Im          



           



 Injection          

 

 Biologic Agent        Vanita Uldrich, FNP-C  2019



 Administration          



        Injection          



           

 

 Therapeutic, Prophylactic Or        August Castillo M.D.  2019



 Diagnostic Injection Subq/Im          



           



 Injection          

 

 Biologic Agent        Vanita Uldrich, FNP-C  2019



 Administration          



        Injection          



           

 

 Therapeutic, Prophylactic Or        Nayana Velarde M.D.  2019



 Diagnostic Injection Subq/Im          



           



 Injection          

 

 Biologic Agent        Vanita Uldrich, FNP-C  2019



 Administration          



        Injection          



           

 

 Therapeutic, Prophylactic Or        Vanita Uldrich, FNP-C  2019



 Diagnostic Injection Subq/Im          



           



 Injection          

 

 Celestone/Cortisone        Tere Anandkristiadele,  2015



 [id number] 1 cc        PH.D, RPA-C  



          Injection          



           

 

 Celestone/Cortisone        Tere Anandkristiadlee,  2015



 [id number] 1 cc        PH.D, RPA-C  



          Injection          



           

 

 Injection        Robbi Rubinstein, M.D.  2013



   Injection          

 

 Injection        Allergy Injection  2013



   Injection          

 

 Injection        Allergy Injection  2013



   Injection          

 

 Injection        Allergy Injection  2013



   Injection          

 

 Injection        Robbi Rubinstein, M.D.  2013



   Injection          

 

 Injection        Allergy Injection  2013



   Injection          

 

 Injection        Robbi Rubinstein, M.D.  2013



   Injection          

 

 Injection        Allergy Injection  2013



   Injection          

 

 Injection        Robbi Rubinstein, M.D.  2013



   Injection          

 

 Injection        Robbi Rubinstein, M.D.  2013



   Injection          

 

 Injection        Elliot Rubinstein, M.D.  2013



   Injection          

 

 Injection        Elliot Rubinstein, M.D.  2013



   Injection          

 

 Injection        Nayana Velarde M.D.  2012



   Injection          

 

 Injection        Nayana Velarde M.D.  2012



   Injection          

 

 Injection        Nayana Velarde M.D.  2012



   Injection          

 

 Injection        Nayana Velarde M.D.  10/23/2012



   Injection          

 

 Injection        Nayana Velarde M.D.  10/02/2012



   Injection          

 

 Injection        Nayana Velarde M.D.  2012



   Injection          

 

 Injection        Nayana Velarde M.D.  2012



   Injection          

 

 Injection        Nayana Velarde M.D.  2012



   Injection          

 

 Injection        Nayana Velarde M.D.  2012



   Injection          

 

 Injection        Nayana Velarde M.D.  2012



   Injection          

 

 Injection        Nayana Velarde M.D.  07/10/2012



   Injection          

 

 Injection        Nayana Velarde M.D.  2012



   Injection          

 

 Injection        Nayana Velarde M.D.  2012



   Injection          

 

 Injection        Nayana Velarde M.D.  2012



   Injection          

 

 Injection        Nayana Velarde M.D.  05/15/2012



   Injection          

 

 Injection        Nayana Velarde M.D.  2012



   Injection          

 

 Injection        Nayana HUNT Abdoulalice, GISELADLiz  2012



   Injection          

 

 Injection        Nayana HUNT Prachi, GISELADLiz  04/10/2012



   Injection          

 

 Injection        Nayana HUNT Prachi, GISELADLiz  2012



   Injection          

 

 Injection        Nayana HUNT Abdoulalice, GISELADLiz  2012



   Injection          

 

 Injection        Robbi Rubinstein, M.DLiz  2012



   Injection          

 

 Injection        Robbi Rubinstein, GISELADLiz  2012



   Injection          

 

 Injection        August Castillo, GISELADLiz  12/15/2011



   Injection          

 

 Injection        Robbi Rubinstein, M.DLiz  2011



   Injection          

 

 Injection        Robbi Rubinstein, M.DLiz  2011



   Injection          

 

 Injection        Robbi Rubinstein, GILBERT.DLiz  2011



   Injection          

 

 Injection        Robbi Rubinstein, M.DLiz  10/13/2011



   Injection          

 

 Injection        Robbi Rubinstein, M.DLiz  2011



   Injection          

 

 Injection        Robbi Rubinstein, GILBERT.DLiz  2011



   Injection          

 

 Injection        Robbi Rubinstein, M.DLiz  2011



   Injection          

 

 Injection        Robbi Rubinstein, M.DLiz  2011



   Injection          

 

 Injection        Robbi Rubinstein, M.DLiz  2011



   Injection          

 

 Injection        Robbi Rubinstein, M.DLiz  2011



   Injection          

 

 Injection        Robbi Rubinstein, M.DLiz  2011



   Injection          

 

 Injection        Robbi Rubinstein, M.DLiz  2011



   Injection          

 

 Injection        Robbi Rubinstein, M.DLiz  2011



   Injection          

 

 Injection        Robbi Rubinstein, M.DLiz  2011



   Injection          

 

 Injection        Robbi Rubinstein, M.DLiz  2011



   Injection          

 

 Injection        Robbi Rubinstein, M.DLiz  2011



   Injection          

 

 Injection        Robbi Rubinstein, M.DLiz  2011



   Injection          

 

 Injection        Robbi Rubinstein, M.DLiz  2011



   Injection          

 

 Injection        Robbi Rubinstein, M.DLiz  2011



   Injection          

 

 Injection        Robbi Rubinstein, M.DLiz  2010



   Injection          

 

 Injection        Robbi Rubinstein, M.DLiz  2010



   Injection          

 

 Injection        Robbi Rubinstein, M.DLiz  10/12/2010



   Injection          

 

 Injection        Robbi Rubinstein, M.DLiz  2010



   Injection          

 

 Injection        Robbi Rubinstein, M.DLiz  2010



   Injection          

 

 Injection        Robbi Rubinstein, M.DLiz  2010



   Injection          

 

 Injection        Robbi Rubinstein, M.DLiz  08/10/2010



   Injection          

 

 Injection        Nayana M Prachi, M.D.  2010



   Injection          

 

 Injection        Robbi Rubinstein, M.DLiz  2010



   Injection          

 

 Injection        Robbi Rubinstein, M.DLiz  2010



   Injection          

 

 Injection        Robbi Rubinstein, M.DLiz  06/15/2010



   Injection          

 

 Injection        Robbi Rubinstein, M.DLiz  2010



   Injection          

 

 Injection        Robbi Rubinstein, M.DLiz  2010



   Injection          

 

 Injection        Robbi Rubinstein, M.DLiz  2010



   Injection          

 

 Injection        Robbi Rubinstein, M.DLiz  2010



   Injection          

 

 Injection        Robbi Rubinstein, GISELADLiz  2010



   Injection          

 

 Injection        Robbi Rubinstein, GILBERT.DLiz  2010



   Injection          

 

 Injection        Robbi Rubinstein, M.DLiz  2010



   Injection          

 

 Injection        Robbi Rubinstein, M.DLiz  2010



   Injection          

 

 Injection        Nayana Velarde M.D.  2010



   Injection          

 

 Injection        Curt Sánchez,  2010



   Injection        M.D.  

 

 Injection        Robbi Rubinstein, M.DLiz  2010



   Injection          

 

 Injection        Robbi Rubinstein, GISELADLiz  2009



   Injection          

 

 Injection        Curt Sánchez,  12/10/2009



   Injection        M.D.  

 

 Injection        Robbi Rubinstein, M.DLiz  2009



   Injection          

 

 Injection        Robbi Rubinstein, M.DLiz  11/10/2009



   Injection          

 

 Injection        Robbi Rubinstein, M.DLiz  10/27/2009



   Injection          

 

 Injection        Robbi Rubinstein, M.DLiz  10/08/2009



   Injection          

 

 Injection        Robbi Rubinstein, M.DLiz  2009



   Injection          

 

 Injection        Robbi Rubinstein, M.DLiz  09/10/2009



   Injection          

 

 Injection        Robbi Rubinstein, M.DLiz  2009



   Injection          

 

 Injection        Robbi Rubinstein, M.DLiz  2009



   Injection          

 

 Injection        Robbi Rubinstein, GILBERT.DLiz  2009



   Injection          

 

 Injection        Robbi Rubinstein, M.DLiz  2009



   Injection          

 

 Injection        Robbi Rubinstein, MLizDLiz  2009



   Injection          

 

 Injection        Curt Sánchez,  2009



   Injection        M.DLiz  

 

 Injection        Robbi Rubinstein, M.DLiz  2009



   Injection          

 

 Injection        Robbi Rubinstein, GILBERT.DLiz  2009



   Injection          

 

 Injection        Robbi Rubinstein, M.DLiz  2009



   Injection          

 

 Injection        Robbi Rubinstein, M.DLiz  2009



   Injection          

 

 Injection        Robbi Rubinstein, M.DLiz  2009



   Injection          

 

 Injection        Robbi Rubinstein, M.DLiz  2009



   Injection          

 

 Injection        Robbi Rubinstein, GISELADLiz  2009



   Injection          

 

 Injection        Robbi Rubinstein, GISELADLiz  2009



   Injection          

 

 Injection        Robbi Rubinstein, M.D.  2009



   Injection          

 

 Injection        Robbi Rubinstein, GISELADLiz  2009



   Injection          

 

 Injection        Robbi Rubinstein, M.DLiz  2009



   Injection          

 

 Injection        Robbi Rubinstein, M.DLiz  2009



   Injection          

 

 Injection        Robbi Rubinstein, GILBERT.DLiz  01/15/2009



   Injection          

 

 Injection        Robbi Rubinstein, M.D.  2008



   Injection          

 

 Injection        Robbi Rubinstein, M.D.  2008



   Injection          

 

 Injection        Robbi Rubinstein, M.D.  2008



   Injection          

 

 Injection        Robbi Rubinstein, M.D.  2008



   Injection          

 

 Injection        Robbi Rubinstein, M.DLiz  2008



   Injection          

 

 Injection        Robbi Rubinstein, M.DLiz  2008



   Injection          

 

 Injection        Robbi Rubinstein, M.DLiz  10/30/2008



   Injection          

 

 Injection        Robbi Rubinstein, M.DLiz  10/23/2008



   Injection          

 

 Injection        Robbi Rubinstein, MLizDLiz  10/16/2008



   Injection          

 

 Injection        Robbi Rubinstein, M.D.  10/09/2008



   Injection          

 

 Injection        Robbi Rubinstein, GISELADLiz  10/02/2008



   Injection          

 

 Injection        Robbi Rubinstein, MLizDLiz  2008



   Injection          

 

 Injection        Robbi Rubinstein, M.D.  2008



   Injection          

 

 Injection        Robbi RubinsteinJIMMY lawler  2008



   Injection          

 

 Injection        Robbi RubinsteinJIMMY lawler  2008



   Injection          

 

 Injection        Robbi Rubinstein, M.D.  2008



   Injection          

 

 Injection        Robbi Rubinstein, M.D.  2008



   Injection          

 

 Injection        Robbi RubinsteinJIMMY lawler  2008



   Injection          

 

 Injection        Robbi RubinsteinJIMMY lawler  2008



   Injection          

 

 Injection        Robbi Rubinstein, M.D.  2008



   Injection          

 

 Injection        Robbi Rubinstein, M.D.  2008



   Injection          

 

 Injection        Robbi RubinsteinJIMMY lawler  07/15/2008



   Injection          

 

 Injection        Robbi RubinsteinJIMMY lawler  07/10/2008



   Injection          

 

 Injection        Robbi Rubinstein, M.D.  2008



   Injection          

 

 Injection        Robbi RubinsteinJIMMY lawler  2008



   Injection          

 

 Injection        Robbi RubinsteinJIMMY lawler  2008



   Injection          

 

 Injection        Robbi RubinsteinJIMMY lawler  2008



   Injection          

 

 Injection        Robbi RubinsteinJIMMY lawler  2008



   Injection          

 

 Injection        Robbi RubinsteinJIMMY lawler  2008



   Injection          

 

 Injection        Robbi RubinsteinJIMMY lawler  2008



   Injection          

 

 Injection        Robbi Rubinstein, M.D.  2008



   Injection          

 

 Injection        Robbi RubinsteinJIMMY lawler  2008



   Injection          

 

 Injection        Robbi RubinsteinJIMMY lawler  2008



   Injection          







Immunizations







 CPT Code  Status  Date  Vaccine  Lot #

 

 [id number]  Given  10/01/2013  Influenza Vaccine  

 

 [id number]  Given  2011  Pneumococcal Vaccine  

 

 90305  Given  Unknown  Pneumococcal Vaccine  

 

 48372  Given  Unknown  Influenza Vaccine  

 

 06598  Given  Unknown  Influenza Vaccine  

 

 44044  Given  Unknown  Influenza Vaccine  

 

 78920  Given  Unknown  Influenza Vaccine  

 

 91273  Given  Unknown  Influenza Vaccine  







Vital Signs







 Date  Vital  Result  Comment

 

 2020  1:28pm  Height  65 inches  5'5"









 Weight  241.00 lb  

 

 Weight  109.318 kg  

 

 Respiratory Rate  18 /min  

 

 Heart Rate  89 /min  

 

 O2 % BldC Oximetry  97 %  

 

 BP Systolic  135 mmHg  

 

 BP Diastolic  66 mmHg  

 

 Asthma Control Test  14  

 

 BMI (Body Mass Index)  40.1 kg/m2  









 2020 11:18am  Height  65 inches  5'5"









 Weight  241.00 lb  

 

 Weight  109.318 kg  

 

 Respiratory Rate  16 /min  

 

 Heart Rate  87 /min  

 

 O2 % BldC Oximetry  95 %  

 

 BP Systolic  135 mmHg  

 

 BP Diastolic  64 mmHg  

 

 Asthma Control Test  10  

 

 BMI (Body Mass Index)  40.1 kg/m2  







Results







 Description

 

 No Information Available







Procedures







 Date  Code  Description  Status

 

 2020  89466  Biologic Agent Administration  Completed

 

 2020  06138  Therapeutic, Prophylactic Or Diagnostic Injection Subq/Im  
Completed

 

 2020  14252  Biologic Agent Administration  Completed

 

 2020  68726  Ippb  Completed

 

 2020  62729  Biologic Agent Administration  Completed

 

 2020  18464  Therapeutic, Prophylactic Or Diagnostic Injection Subq/Im  
Completed

 

 2020  56889  Biologic Agent Administration  Completed

 

 2020  56347  Therapeutic, Prophylactic Or Diagnostic Injection Subq/Im  
Completed

 

 2020  90201  Ippb  Completed

 

 2019  47371  Biologic Agent Administration  Completed

 

 2019  92453  Therapeutic, Prophylactic Or Diagnostic Injection Subq/Im  
Completed

 

 2019  54424  Ippb  Completed

 

 2019  67158  Therapeutic, Prophylactic Or Diagnostic Injection Subq/Im  
Completed

 

 2019  31404  Biologic Agent Administration  Completed

 

 2019  76835  Biologic Agent Administration  Completed

 

 2019  37679  Therapeutic, Prophylactic Or Diagnostic Injection Subq/Im  
Completed

 

 2019  16894  Biologic Agent Administration  Completed

 

 2019  71411  Therapeutic, Prophylactic Or Diagnostic Injection Subq/Im  
Completed

 

 10/23/2019  76776  Biologic Agent Administration  Completed

 

 10/23/2019  68088  Therapeutic, Prophylactic Or Diagnostic Injection Subq/Im  
Completed

 

 10/09/2019  71145  Biologic Agent Administration  Completed

 

 10/09/2019  46462  Therapeutic, Prophylactic Or Diagnostic Injection Subq/Im  
Completed

 

 2019  88649  Therapeutic, Prophylactic Or Diagnostic Injection Subq/Im  
Completed

 

 2019  22589  Biologic Agent Administration  Completed

 

 2019  05735  Therapeutic, Prophylactic Or Diagnostic Injection Subq/Im  
Completed

 

 2019  51732  Biologic Agent Administration  Completed

 

 2019  21695  Therapeutic, Prophylactic Or Diagnostic Injection Subq/Im  
Completed







[employer] Devices







 Description

 

 No Information Available







Encounters







 Type  Date  Location  Provider  Dx  Diagnosis

 

 Office Visit  2019  [place] [facility]  Vanitadariana Alcazar,  J45.50  Severe 
persistent



   11:20a    FNP-C    asthma, uncomplicated









 J30.1  Allergic rhinitis due to pollen

 

 J45.50  Severe persistent asthma, uncomplicated

 

 J30.2  Other seasonal allergic rhinitis

 

 J30.81  Allergic rhinitis due to animal (cat) (dog) hair and dander

 

 J30.1  Allergic rhinitis due to pollen

 

 J30.2  Other seasonal allergic rhinitis

 

 J30.81  Allergic rhinitis due to animal (cat) (dog) hair and dander

 

 J30.89  Other allergic rhinitis







Assessments







 Date  Code  Description  Provider

 

 2020  J45.51  Severe persistent asthma with (acute)  Nayana Velarde M.D.



     exacerbation  

 

 2020  J45.51  Severe persistent asthma with (acute)  Vanita Daviddrich, FNP
-C



     exacerbation  

 

 2020  J30.81  Allergic rhinitis due to animal (cat) (dog)  Nayana Velarde M.D.



     hair and dander  

 

 2020  J30.81  Allergic rhinitis due to animal (cat) (dog)  Vanita 
Inge, FNP-C



     hair and dander  

 

 2020  J30.89  Other allergic rhinitis  Nayana Velarde M.D.

 

 2020  J30.89  Other allergic rhinitis  Vanita Inge, FNP-C

 

 2020  J30.1  Allergic rhinitis due to pollen  Nayana Velarde M.D.

 

 2020  J30.1  Allergic rhinitis due to pollen  Vanitageovany Alcazar, FNP-C

 

 2020  J45.51  Severe persistent asthma with (acute)  Nayana Velarde M.D.



     exacerbation  

 

 2020  J45.51  Severe persistent asthma with (acute)  MARIAN Marino-C



     exacerbation  

 

 2020  J20.9  Acute bronchitis, unspecified  Abigail Horowitz FNP-C

 

 2020  J45.50  Severe persistent asthma, uncomplicated  Nayana Velarde M.D.

 

 2020  J45.50  Severe persistent asthma, uncomplicated  Nayana Velarde M.D.

 

 2020  J45.50  Severe persistent asthma, uncomplicated  Vanita Inge, 
FNP-C

 

 2020  J30.2  Other seasonal allergic rhinitis  Nayana Velarde M.D.

 

 2020  J30.2  Other seasonal allergic rhinitis  Vanita Uldrich, FNP-C

 

 2020  J30.81  Allergic rhinitis due to animal (cat) (dog)  Nayana Velarde M.D.



     hair and dander  

 

 2020  J30.81  Allergic rhinitis due to animal (cat) (dog)  Vanita 
Uldrich, FNP-C



     hair and dander  

 

 2020  J30.89  Other allergic rhinitis  Nayana Velarde M.D.

 

 2020  J30.89  Other allergic rhinitis  Vanita Uldrich, FNP-C

 

 2020  J30.1  Allergic rhinitis due to pollen  Vanita Uldrich, FNP-C

 

 2020  J45.50  Severe persistent asthma, uncomplicated  Nayana Velarde M.D.

 

 2020  J45.50  Severe persistent asthma, uncomplicated  Vanita Uldrich, 
FNP-C

 

 2020  J30.2  Other seasonal allergic rhinitis  Nayana Velarde M.D.

 

 2020  J30.2  Other seasonal allergic rhinitis  Vanita Uldrich, FNP-C

 

 2020  J30.81  Allergic rhinitis due to animal (cat) (dog)  Nayana Velarde M.D.



     hair and dander  

 

 2020  J30.81  Allergic rhinitis due to animal (cat) (dog)  Vanita 
Uldrich, FNP-C



     hair and dander  

 

 2020  J30.89  Other allergic rhinitis  Nayana Velarde M.D.

 

 2020  J30.89  Other allergic rhinitis  Vanita Uldrich, FNP-C

 

 2020  J30.1  Allergic rhinitis due to pollen  Vanita Uldrich, FNP-C

 

 2019  J45.50  Severe persistent asthma, uncomplicated  Vanita Uldrich, 
FNP-C

 

 2019  J30.2  Other seasonal allergic rhinitis  Nayana Velarde M.D.

 

 2019  J30.2  Other seasonal allergic rhinitis  Vanita Uldrich, FNP-C

 

 2019  J30.81  Allergic rhinitis due to animal (cat) (dog)  Nayana Velarde M.D.



     hair and dander  

 

 2019  J30.81  Allergic rhinitis due to animal (cat) (dog)  Vanita 
Uldrich, FNP-C



     hair and dander  

 

 2019  J30.89  Other allergic rhinitis  Nayana Velarde M.D.

 

 2019  J30.89  Other allergic rhinitis  Vanita Uldrich, FNP-C

 

 2019  J30.1  Allergic rhinitis due to pollen  Nayana Velarde M.D.

 

 2019  J30.1  Allergic rhinitis due to pollen  Vanita Uldrich, FNP-C

 

 2019  J45.50  Severe persistent asthma, uncomplicated  Vanita Uldrich, 
FNP-C

 

 2019  J45.50  Severe persistent asthma, uncomplicated  Nayana Velarde M.D.

 

 2019  J30.2  Other seasonal allergic rhinitis  Vanita Uldrich, FNP-C

 

 2019  J45.50  Severe persistent asthma, uncomplicated  Vanita Uldrich, 
FNP-C

 

 2019  J30.81  Allergic rhinitis due to animal (cat) (dog)  Vanita 
Uldrich, P-C



     hair and dander  

 

 2019  J30.2  Other seasonal allergic rhinitis  Nayana Velarde M.D.

 

 2019  J30.89  Other allergic rhinitis  Vanita Uldrich, FNP-C

 

 2019  J30.2  Other seasonal allergic rhinitis  Vanita Uldrich, FNP-C

 

 2019  J30.81  Allergic rhinitis due to animal (cat) (dog)  Nayana Velarde M.D.



     hair and dander  

 

 2019  J30.81  Allergic rhinitis due to animal (cat) (dog)  Vanita 
Uldrich, P-C



     hair and dander  

 

 2019  J30.89  Other allergic rhinitis  Nayana Velarde M.D.

 

 2019  J30.89  Other allergic rhinitis  Vanita Uldrich, FNP-C

 

 2019  J30.1  Allergic rhinitis due to pollen  Vanita Uldrich, FNP-C

 

 2019  J45.50  Severe persistent asthma, uncomplicated  Vanita Uldrich, 
FNP-C

 

 2019  J45.50  Severe persistent asthma, uncomplicated  Nayana Velarde M.D.

 

 2019  J45.50  Severe persistent asthma, uncomplicated  Vanita Uldrich, 
FNP-C

 

 2019  J30.2  Other seasonal allergic rhinitis  Nayana Velarde M.D.

 

 2019  J30.2  Other seasonal allergic rhinitis  Vanita Uldrich, FNP-C

 

 2019  J30.81  Allergic rhinitis due to animal (cat) (dog)  Nayana Velarde M.D.



     hair and dander  

 

 2019  J30.81  Allergic rhinitis due to animal (cat) (dog)  Vanita 
Uldrich, FNP-C



     hair and dander  

 

 2019  J30.89  Other allergic rhinitis  Nayana Velarde M.D.

 

 2019  J30.89  Other allergic rhinitis  Vanita Uldrich, FNP-C

 

 2019  J30.1  Allergic rhinitis due to pollen  Vanita Uldrich, FNP-C

 

 2019  J45.50  Severe persistent asthma, uncomplicated  Vanita Uldrich, 
FNP-C

 

 2019  J45.50  Severe persistent asthma, uncomplicated  Nayana Velarde M.D.

 

 2019  J45.50  Severe persistent asthma, uncomplicated  Vanita Uldrich, 
FNP-C

 

 2019  J30.2  Other seasonal allergic rhinitis  Nayana Velarde M.D.

 

 2019  J30.2  Other seasonal allergic rhinitis  Vanita Uldrich, FNP-C

 

 2019  J30.81  Allergic rhinitis due to animal (cat) (dog)  Nayana Velarde M.D.



     hair and dander  

 

 2019  J30.81  Allergic rhinitis due to animal (cat) (dog)  Vanita 
Uldrich, FNP-C



     hair and dander  

 

 2019  J30.89  Other allergic rhinitis  Nayana Velarde M.D.

 

 2019  J30.89  Other allergic rhinitis  Vanita Uldrich, FNP-C

 

 2019  J30.1  Allergic rhinitis due to pollen  Vanita Uldrich, FNP-C

 

 10/23/2019  J45.50  Severe persistent asthma, uncomplicated  Vnaita Uldrich, 
FNP-C

 

 10/23/2019  J30.2  Other seasonal allergic rhinitis  Vanita Uldrich, FNP-C

 

 10/23/2019  J30.81  Allergic rhinitis due to animal (cat) (dog)  Vanita 
Uldrich, FNP-C



     hair and dander  

 

 10/23/2019  J30.89  Other allergic rhinitis  Vanita Uldrich, FNP-C

 

 10/23/2019  J30.1  Allergic rhinitis due to pollen  Vanita Uldrich, FNP-C

 

 10/09/2019  J45.50  Severe persistent asthma, uncomplicated  Vanita Uldrich, 
FNP-C

 

 10/09/2019  J45.50  Severe persistent asthma, uncomplicated  Nayana Velarde M.D.

 

 10/09/2019  J45.50  Severe persistent asthma, uncomplicated  Vanita Uldrich, 
FNP-C

 

 10/09/2019  J30.2  Other seasonal allergic rhinitis  Nayana Velarde M.D.

 

 10/09/2019  J30.2  Other seasonal allergic rhinitis  Vanita Uldrich, FNP-C

 

 10/09/2019  J30.81  Allergic rhinitis due to animal (cat) (dog)  Nayana Velarde M.D.



     hair and dander  

 

 10/09/2019  J30.81  Allergic rhinitis due to animal (cat) (dog)  Vanita 
Uldrich, FNP-C



     hair and dander  

 

 10/09/2019  J30.89  Other allergic rhinitis  Nayana Velarde M.D.

 

 10/09/2019  J30.89  Other allergic rhinitis  Vanita Uldrich, FNP-C

 

 2019  J45.50  Severe persistent asthma, uncomplicated  Vanita Uldrich, 
FNP-C

 

 2019  J45.50  Severe persistent asthma, uncomplicated  Nayana Velarde M.D.

 

 2019  J45.50  Severe persistent asthma, uncomplicated  Vanita Uldrich, 
FNP-C

 

 2019  J30.1  Allergic rhinitis due to pollen  Nayana Velarde M.D.

 

 2019  J30.1  Allergic rhinitis due to pollen  Vanita Ulich, FNP-C

 

 2019  J30.2  Other seasonal allergic rhinitis  Nayana Velarde M.D.

 

 2019  J30.2  Other seasonal allergic rhinitis  Vanita Uldrich, FNP-C

 

 2019  J30.81  Allergic rhinitis due to animal (cat) (dog)  Nayana Velarde M.D.



     hair and dander  

 

 2019  J30.81  Allergic rhinitis due to animal (cat) (dog)  Vanita 
Uldrich, [facility]-C



     hair and dander  

 

 2019  J30.89  Other allergic rhinitis  Vanita Uldrich, FNP-C

 

 2019  J45.50  Severe persistent asthma, uncomplicated  Vanita Uldrich, 
FNP-C

 

 2019  J45.50  Severe persistent asthma, uncomplicated  Nayana Velarde M.D.

 

 2019  J45.50  Severe persistent asthma, uncomplicated  Vanita Uldrich, 
FNP-C

 

 2019  J30.1  Allergic rhinitis due to pollen  Nayana Velarde M.D.

 

 2019  J30.1  Allergic rhinitis due to pollen  Vanita Sultanaich, FNP-C

 

 2019  J30.2  Other seasonal allergic rhinitis  Nayana Velarde M.D.

 

 2019  J30.2  Other seasonal allergic rhinitis  Vanita Uldrich, FNP-C

 

 2019  J30.81  Allergic rhinitis due to animal (cat) (dog)  Nayana Velarde M.D.



     hair and dander  

 

 2019  J30.81  Allergic rhinitis due to animal (cat) (dog)  Vanita 
Uldrich, [facility]-C



     hair and dander  

 

 2019  J30.89  Other allergic rhinitis  Nayana Velarde M.D.

 

 2019  J30.89  Other allergic rhinitis  Vanita Uldrich, FNP-C

 

 2019  J45.50  Severe persistent asthma, uncomplicated  Vanita Uldrich, 
FNP-C

 

 2019  J45.50  Severe persistent asthma, uncomplicated  Nayana Velarde M.D.

 

 2019  J30.1  Allergic rhinitis due to pollen  Vanita Uldrich, FNP-C

 

 2019  J45.50  Severe persistent asthma, uncomplicated  Vanita Uldrich, 
FNP-C

 

 2019  J30.2  Other seasonal allergic rhinitis  Vanita Uldrich, FNP-C

 

 2019  J30.1  Allergic rhinitis due to pollen  Nayana Velarde M.D.

 

 2019  J30.81  Allergic rhinitis due to animal (cat) (dog)  Vanita 
Uldrich, FNP-C



     hair and dander  

 

 2019  J30.1  Allergic rhinitis due to pollen  Vanita Uldrich, FNP-C

 

 2019  J30.2  Other seasonal allergic rhinitis  Nayana Velarde M.D.

 

 2019  J30.2  Other seasonal allergic rhinitis  Vanita Uldrich, FNP-C

 

 2019  J30.81  Allergic rhinitis due to animal (cat) (dog)  Nayana Velarde M.D.



     hair and dander  

 

 2019  J30.81  Allergic rhinitis due to animal (cat) (dog)  Vanita 
Daviddrich, FNP-C



     hair and dander  

 

 2019  J30.89  Other allergic rhinitis  Vanita Uldrich, FNP-C







Plan of Treatment

Future Appointment(s):2020 11:20 am - Vanita Inge, FNP-C at [facility]2020 11:20 am - Vanita Uldrich, FNP-C at [facility]2020 
11:20 am - Vanita Sultanaich, FNP-C at [facility]



Functional Status







 Description

 

 No Information Available







Mental Status







 Description

 

 No Information Available







Referrals







 Description

 

 No Information Available

## 2020-02-27 NOTE — XMS REPORT
Continuity of Care Document

 Created on:2020



Patient:CARLTON VIGIL

Sex:Female

:1950

External Reference #:[id number]





Demographics







 Address  [address] [place] [address]

 

 Home Phone  [phone]

 

 Mobile Phone  [phone]

 

 Preferred Language  en

 

 Marital Status  Not  or 

 

 Church Affiliation  Unknown

 

 Race  White

 

 Ethnic Group  Not  or 









Author







 Name  Manager, System

 

 Address  Unavailable



   Unavailable



   ,









Support







 Name  Relationship  Address  Phone

 

 CARLTON VIGIL  Unavailable  [address] [address]  [email or web address]



     [place] [address]  

 

 Scotty VIGIL  Unavailable  [address] [address]  [phone]



     [place] [address]  









Care Team Providers







 Name  Role  Phone

 

 Collette LOPEZ, Nicole  Unavailable  Unavailable

 

 Bryan Layne DO  Unavailable  [phone]

 

 Sandy LOPEZ, Mykel  Unavailable  [phone]

 

 Landon LOPEZ, Pilo  Unavailable  [phone]

 

 Inez Ponce DO  Unavailable  [phone]

 

 Quinton Borja MD  Unavailable  [phone]

 

 Prachi LOPEZ, Nayana HUNT  Unavailable  [phone]

 

 David LPN, Nisha  Unavailable  Unavailable

 

 Kirti MSN, NPC, Siria  Unavailable  [phone]

 

 Sandrita LPN, Selam  Unavailable  Unavailable

 

 NP Preload, NP  Unavailable  Unavailable

 

 Gladys LPN, Phyllis  Unavailable  Unavailable

 

 Jannette VITALE, Toney  Unavailable  Unavailable

 

 Carlos Enrique LPN, Chacha  Unavailable  Unavailable

 

 More LRT, Tank  Unavailable  Unavailable

 

 Unavailable  Unavailable  [phone]









Problems







 ALLERGIC RHINITIS (J30.9) (477.9)  Kirti, MSN, NPC Siria  

 

 ASTHMA (Renamed from AIRWAY HYPERREACTIVITY) (J45.909) (493.90)  Kirti, MSN, 
NPC Siria  



 Prognosis: patient presents in follow up. hx ACOS and sleep apnea. has been 
stable on current therapy. still taking additional ICS, qvar at noon, with 
samples. spirometry is without obstruction and Feno is normal.    



  denies any recent symptoms or exacerbations. compliant with cpap nightly and 
reports benefit. our impressions are unchanged. she will attempt to hold the 
qvar and restart if needed. continue cpap night    



 ly. encouraged weight loss efforts. RTO 6 months and prn. plan of care 
approved by Dr. Bryan Layne as of 9-Sep-2019    

 

 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  



 Prognosis: patient presents for routine visit with diagnoses as noted. reports 
increased allergy symptoms lately leading to exacerbation. has been on oral 
steroids x2 with improvement. didn't tolerate step down th    



 erapy after 2019 when she left with samples of lower doses for ICS and 
LAMA. remains on cpap nightly with benefit. has lost additional weight with 
diet change, wants to lose more. reflux has been    



 fine. spirometry has reduced FVC and small airway disease. feno is normal. our 
impressions are noted. continue current therapy including cpap. avoid triggers. 
weight loss encouraged. she will call with    



 problems. RTO 6 months with tests and prn. plan of care approved by Dr. Bryan Layne as of 9-Sep-2019    

 

 CHRONIC KIDNEY DISEASE (N18.9) (585.9)  RAVIN Cotto NPC Jillian  

 

 CORONARY HEART DISEASE (I25.10) (414.9)  RAVIN Cotto NPC Jillian  

 

 DM (DIABETES MELLITUS), TYPE 2 (E11.9) (250.00)  RAVIN Cotto NPC Jillian  

 

 GASTROESOPHAGEAL REFLUX DISEASE (K21.9) (530.81)  RAVIN Cotto NPC Jillian  

 

 HEART MURMUR (R01.1) (785.2)  RAVIN Cotto NPC Jillian  

 

 HYPERLIPIDEMIA (Renamed from HLD (HYPERLIPIDEMIA)) (E78.5) (272.4)  RAVIN Cotto NPC Jillian  

 

 HYPERTENSION (I10) (401.9)  RAVIN Cotto NPC Jillian  

 

 OBESITY (Renamed from OBESE) (E66.9) (278.00)  RAVIN Cotto NPC Jillian  

 

 OBSTRUCTIVE SLEEP APNEA (G47.33) (327.23)  RAVIN Cotto NPC Jillian  Comments
: severe AHI



     49/hr, LS 73%

 

 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



   RAVIN Cotto NPC Jillian  s/p TAP







Allergies and Adverse Reactions







 Feldene *ANALGESICS - ANTI-INFLAMMATORY* (Allergy)    Comments: BLISTERS

 

 Latex (Allergy)    

 

 Penicillins (Allergy)    Reaction: Rash







Medications







 Albuterol Sulfate (2.5 MG/3ML) 0.083% Inhalation Nebulization Solution;  1 (one
) Nebulized Soln four times daily, as needed for 30 days  Ordered: 17-Oct-2017  
Start: 17-Oct-2017



 Quantity: 2 {Box}  RAVIN Cotto, GLENN Beverly  Comments: Medication taken as 
needed.



 Refills: 2    

 

 ALLOPURINOL, 100MG (Oral    



 Tablet);  2 daily (100 MG)    

 

 Astelin 137 MCG/SPRAY Nasal    



 Solution;  1 each nare two    



 times daily (137 MCG/SPRAY)    

 

 ATORVASTATIN CALCIUM, 10MG    



 (Oral Tablet);  1 daily (10 MG)    

 

 AVAPRO, 75MG (Oral Tablet);  1    



 daily (75 MG)    

 

 Dulera 200-5 MCG/ACT Inhalation Aerosol;  2 Puff two times daily for 30 days  
Ordered: 9-Sep-2019  Start: 9-Sep-2019



 Quantity: 1 {Inhaler}  RAVIN Cotto NPC Jillian  



 Refills: 11    

 

 FLONASE, 50MCG/ACT (Nasal Suspension);  1 (one) Spray two times daily, each 
nostril for 30 days  Ordered: 2016  Start: 2016



 Quantity: 1 {Spray}  RAVIN Cotto NPC Jillian  



 Refills: 11    

 

 KLOR-CON 10, 10MEQ (Oral Tablet    



 Extended Release);  1 daily (10    



 MEQ)    

 

 Lasix 80 MG Oral Tablet;  1 two    



 times daily (80 MG)    

 

 Levothyroxine Sodium 125 MCG    



 Oral Capsule;  1 daily (125    



 MCG)    

 

 PANTOPRAZOLE SODIUM, 40MG (Oral    



 Tablet Delayed Release);  1 two    



 times daily (40 MG)    

 

 Singulair 10 MG Oral Tablet;  1 Tablet daily for 90 days  Ordered: 2019
  Start: 2019



 Quantity: 90 {Tablet}  RAVIN Cotto NPC Jillian  



 Refills: 3    

 

 SPIRIVA RESPIMAT, 2.5mcg (Inhalation Aerosol Breath Activated) (Free Text);  2 
Puff daily for 30 days  Ordered: 1-Mar-2019  Start: 1-Mar-2019



 Quantity: 1 {Inhaler}  RAVIN Cotto NPC Jillian  



 Refills: 11    

 

 Spironolactone 25 MG Oral    



 Tablet;  1 daily (25 MG)    

 

 Ventolin  (90 Base) MCG/ACT Inhalation Aerosol Solution;  2 (two) Puff 
four times daily as needed for 30 days  Ordered: 2018  Start: 2018



 Quantity: 1 {Inhaler}  RAVIN Cotto NPC Jillian  Comments: Medication taken 
as needed.



 Refills: 5    

 

 Xolair 150 MG Subcutaneous    Comments: asthma & allergy



 Solution Reconstituted;  every    assoc



 two weeks (150 MG)    

 

 Xyzal 5 MG Oral Tablet;  1 (one) Tablet daily for 30 days  Ordered: 2017  Start: 2017



 Quantity: 30 {Tablet}  RAVIN Cotto NPC Jillian  



 Refills: 11    

 

 ALENDRONATE SODIUM, 70MG (Oral     End: 27-Mar-2012



 Tablet);  weekly (70 MG)    Status: Inactive

 

 ALVESCO, 160MCG/ACT (Inhalation Aerosol Solution);  2 (two) Puff(s) Puff(s) 
two times daily for 30 days  Ordered: 6-Oct-2014  Start: 30-May-2014 End: 6-Oct-
2014



 Quantity: 1 {Inhaler}  RAVIN Cotto NPC Jillian  Status: Inactive



 Refills: 3    

 

 AMLODIPINE BESYLATE, 5MG (Oral    Status: Inactive



 Tablet);  1 daily (5 MG)    

 

 Astepro 0.15 % Nasal Solution;  1 Solution daily for 30 days  Ordered: 29-Sep-
2017  Start: 2016 End: 29-Sep-2017



 Quantity: 1 {Spray}  RAVIN Cotto NPC Jillian  Status: Inactive



 Refills: 11    Dispense as Written

 

 CARVEDILOL, 6.25MG (Oral    Status: Inactive



 Tablet);  Daily (6.25 MG)    

 

 CPAP ( Device) (Free Text);  at     End: 27-Mar-2012



 bedtime    Status: Inactive



     Comments: +17

 

 CPAP PRESSURE CHANGE (327.23) ( Device) (Free Text);  1 Device change pressure 
to +14 cm for 0 days  Ordered: 1-Oct-2013  Start: 27-Mar-2012 End: 1-Oct-2013



 Quantity: 1 {Device}  RAVIN Cotto NPC Jillian  Status: Inactive



 Refills: 0    

 

 CPAP SUPPLIES AND MASK (327.23) ( Device) (Free Text);  1 Device as needed for 
365 days  Ordered: 27-Sep-2012  Start: 27-Sep-2012 End: 27-Sep-2013



 Refills: 0  RAVIN Cotto, NPC Siria  Status: Inactive



     Comments: Medication taken as needed.

 

 CPAP UNIT (327.23) ( Device)    Status: Inactive



 (Free Text);  auto-titating    Comments: jody



 unit at bedtime    

 

 Gabapentin 100 MG Oral Capsule;    Status: Inactive



  1 two times daily (100 MG)    

 

 LEVOCETIRIZINE DIHYDROCHLORIDE,    Status: Inactive



 5MG (Oral Tablet);  1 daily (5    



 MG)    

 

 METFORMIN HCL, 850MG (Oral    Status: Inactive



 Tablet);  two times daily (850    



 MG)    

 

 NASONEX, 50MCG/ACT (Nasal    Status: Inactive



 Suspension);  daily (50    



 MCG/ACT)    

 

 NEXIUM, 40MG (Oral Capsule    Status: Inactive



 Delayed Release);  daily (40    



 MG)    

 

 PATADAY, 0.2% (Ophthalmic    Status: Inactive



 Solution);  UAD (0.2 %)    

 

 PATANOL, 0.1% (Ophthalmic     End: 27-Mar-2012



 Solution);  as needed (0.1 %)    Status: Inactive



     Comments: Medication taken as needed.

 

 PLAVIX, 75MG (Oral Tablet);    Status: Inactive



 Daily (75 MG)    

 

 Qvar 80 MCG/ACT Inhalation     End: 2018



 Aerosol Solution;  2 daily (80    Status: Inactive



 MCG/ACT)    

 

 VITAMIN D (ERGOCALCIFEROL),     End: 27-Mar-2012



 [id number] (Oral Capsule);    Status: Inactive



 weekly (97169 UNIT)    

 

 ZETIA, 10MG (Oral Tablet);    Status: Inactive



 daily (10 MG)    

 

 ALENDRONATE SODIUM, 35MG (Oral     End: 1-Oct-2013



 Tablet);  weekly (35 MG)    Status: Discontinued

 

 ALVESCO, 160MCG/ACT (Inhalation     End: 27-Mar-2012



 Aerosol Solution);  2 puffs two    Status: Discontinued



 times daily (160 MCG/ACT)    

 

 HYDROCHLOROTHIAZIDE, 12.5MG     End: 2014



 (Oral Capsule);  2 daily (12.5    Status: Discontinued



 MG)    

 

 NASONEX, 50MCG/ACT (Nasal     End: 30-May-2014



 Suspension);  2 sprays each    Status: Discontinued



 nostril daily (50 MCG/ACT)    

 

 OMEPRAZOLE, 20MG (Oral Capsule     End: 27-Mar-2012



 Delayed Release);  daily (20    Status: Discontinued



 MG)    

 

 PROVENTIL HFA, 108 (90 Base)MCG/ACT (Inhalation Aerosol Solution);  2 (two) 
Puff(s) four times daily, as needed for 0 days  Ordered: 2014  Start: 1-
Oct-2013 End: 2014



 Quantity: 1 {Inhaler(s)}  ITALIA Remy  Status: Discontinued



 Refills: 1    Comments: Medication taken as needed.

 

 SEREVENT DISKUS, 50MCG/DOSE     End: 27-Mar-2012



 (Inhalation Aerosol Powder    Status: Discontinued



 Breath Activated);  two times    



 daily (50 MCG/DOSE)    







Procedures







 EXHALED GAS NITRIC OXIDE MEASUREMENT    Status: Completed 9-Sep-2019



 (MOLES/VOLUME) (42756)    

 

 EXHALED GAS NITRIC OXIDE MEASUREMENT    Status: Completed 1-Mar-2019



 (MOLES/VOLUME) (67719)    

 

 RESPIRATORY FLOW VOLUME LOOP (69720)    Status: Completed 2018

 

 AIRFLOW RESISTANCE MEASUREMENT: PULM FUNCT    Status: Completed 2018



 TEST OSCILLOMETRY (16220)    

 

 TOTAL VITAL CAPACITY (86585)    Status: Completed 2018

 

 TOTAL BODY PLETHYSMOGRAPHY: AIRWAY CLOSING    Status: Completed 2018



 VOLUME MEASUREMENT: PULM FUNCT TST    



 PLETHYSMOGRAP (99117)    

 

 RESPIRATORY FLOW VOLUME LOOP (55781)    Status: Completed 2018

 

 THORACIC GAS VOLUME: AIRWAY CLOSING VOLUME    Status: Completed 2018



 MEASUREMENT: PULM FUNCTION TEST BY GAS    



 (06998)    

 

 DLCO (CARBON MONOXIDE DIFFUSING CAPACITY)    Status: Completed 2018



 (04481)    

 

 PRE AND POST (06770)    Status: Completed 2018

 

 REST/ EXERCISE OXIMETRY (80539)    Status: Completed 15-Nov-2017

 

 RESPIRATORY FLOW VOLUME LOOP (27701)    Status: Completed 15-Nov-2017

 

 TOTAL VITAL CAPACITY (62321)    Status: Completed 29-Sep-2017

 

 TOTAL BODY PLETHYSMOGRAPHY (07448)    Status: Completed 29-Sep-2017

 

 TGV THORACIC GAS VOLUME: AIRWAY CLOSING    Status: Completed 29-Sep-2017



 VOLUME MEASUREMENT: PULM FUNCTION TEST BY    



 GAS (68733)    

 

 RESPIRATORY FLOW VOLUME LOOP (33188)    Status: Completed 29-Sep-2017

 

 DLCO (CARBON MONOXIDE DIFFUSING CAPACITY)    Status: Completed 29-Sep-2017



 (87546)    

 

 AIRFLOW RESISTANCE MEASUREMENT: PULM FUNCT    Status: Completed 29-Sep-2017



 TEST OSCILLOMETRY (14718)    

 

 PRE AND POST (85879)    Status: Completed 29-Sep-2017

 

 RESPIRATORY FLOW VOLUME LOOP (07531)    Status: Completed 14-Dec-2016

 

 PRE AND POST (21215)    Status: Completed 14-Dec-2016

 

 AIRFLOW RESISTANCE MEASUREMENT: PULM FUNCT    Status: Completed 2016



 TEST OSCILLOMETRY (39608)    

 

 TOTAL VITAL CAPACITY (14499)    Status: Completed 2016

 

 TOTAL BODY PLETHYSMOGRAPHY: AIRWAY CLOSING    Status: Completed 2016



 VOLUME MEASUREMENT: PULM FUNCT TST    



 PLETHYSMOGRAP (28132)    

 

 RESPIRATORY FLOW VOLUME LOOP (84808)    Status: Completed 2016

 

 THORACIC GAS VOLUME: AIRWAY CLOSING VOLUME    Status: Completed 2016



 MEASUREMENT: PULM FUNCTION TEST BY GAS    



 (85912)    

 

 DLCO (CARBON MONOXIDE DIFFUSING CAPACITY)    Status: Completed 2016



 (01249)    

 

 PRE AND POST (93930)    Status: Completed 2016

 

 SIMPLE PFT: BASELINE PULMONARY FUNCTION    Status: Completed 2015



 TEST (PFT) (89590)    

 

 RESPIRATORY FLOW VOLUME LOOP (32045)    Status: Completed 2015

 

 RESPIRATORY FLOW VOLUME LOOP (30486)    Status: Completed 2015

 

 PRE AND POST W/ RT (96895)    Status: Completed 2015

 

 RESPIRATORY FLOW VOLUME LOOP (61875)    Status: Completed 6-Oct-2014

 

 PRE AND POST (22794)    Status: Completed 6-Oct-2014

 

 REST/EXERCISE OXIMETRY (96977)    Status: Completed 30-May-2014

 

 ACT: ASSESSMENT OF DISEASE: ASTHMA    Status: Cancelled 30-May-2014



 SYMPTOMS EVALUATE (1005F)    

 

 RESPIRATORY FLOW VOLUME LOOP (82876)    Status: Completed 30-May-2014

 

 PRE AND POST (80192)    Status: Completed 30-May-2014

 

 PRE AND POST (11363)    Status: Completed 27-Sep-2012

 

 EXERCISE OXIMETRY (30409)    Status: Completed 27-Sep-2012

 

 RESPIRATORY FLOW VOLUME LOOP (75366)    Status: Completed 27-Sep-2012

 

 Adenoidectomy    Status: Completed 

 

 Appendectomy    Status: Completed 

 

 Cardiac Pacemaker Insertion    Status: Completed Dec-2015

 

 Chest X-ray    Status: Completed 29-Sep-2017



     Comments: no obvious acute disease, official interpretation and comparison 
from the radiologist is pending.

 

 Chest X-ray    Status: Completed 2016



     Comments: pacemaker present. no obvious acute disease, official 
interpretation and comparison from the radiologist is pending.

 

 Chest X-ray    Status: Completed 15-Nov-2017



     Comments: hyperinflation, possible interstitial edema

 

 Cholecystectomy    Status: Completed 

 

 Coronary Artery Bypass, Three    Status: Completed 28-Sep-2015

 

 CPAP    Status: Completed 2010



     Comments: +17 cm

 

 CT Scan of Chest    Status: Completed 2018



     Comments: Abnormal. Emphysematous changes.

 

 FeNO    Status: Completed 9-Sep-2019



     Comments: 15 ppb

 

 FeNO    Status: Completed 1-Mar-2019



     Comments: 8 PPB

 

 FeNO    Status: Completed 2018



     Comments: Normal. 5

 

 FeNO    Status: Completed 2018



     Comments: Normal. 14

 

 Flu Vaccine    Status: Completed 1-Sep-2018



     Comments: date approx

 

 Heart Valve Surgery    Status: Completed 28-Sep-2015



     Comments: repair mitral valve with CABG

 

 Left carotid coiling    Status: Completed 



     Comments: aneurysm

 

 Lithotripsy    Status: Completed 



     Comments: Left.

 

 PFT    Status: Completed 1-Mar-2019



     Comments: reduced FVC 66%. FEV1 74%. ratio 85

 

 PFT    Status: Completed 14-Dec-2016



     Comments: Abnormal. Minimal Obstruction. Vital capacity 70%.

 

 PFT    Status: Completed 27-Mar-2012



     Comments: small airway obstruction, FEV1 1.97 L, 83%. improved from 2011

 

 PFT    Status: Completed 9-Sep-2019



     Comments: reduced FVC with small airway disease

 

 PFT    Status: Completed 2018



     Comments: reduced FVC, no obstruction

 

 PFT    Status: Completed 29-Sep-2017



     Comments: No Obstruction. No Restriction. Moderate Diffusion Defect. 
reduced FVC 67% without obstruction. ratio 79. normal TLC. moderate DLCO defect

 

 PFT    Status: Completed 15-Nov-2017



     Comments: reduced FVC 67%, ratio 80

 

 PFT    Status: Completed 2018



     Comments: Abnormal. Hyperreactive small airways with normal lung volumes 
and moderate diffusion defect.

 

 PFT    Status: Completed 27-Sep-2011



     Comments: Mild Obstruction. improved from 2011

 

 PFT    Status: Completed 27-Sep-2012



     Comments: Mild Obstruction. FEV1 1.82 L, 71%

 

 PFT    Status: Completed 27-Mar-2013



     Comments: Minimal Obstruction. FEV1 2.04 L, 87%. improved from 2012

 

 PFT    Status: Completed 1-Oct-2013



     Comments: Mild Obstruction. FEV1 1.95 L, 84%. stable

 

 PFT    Status: Completed 30-May-2014



     Comments: Moderate Obstruction. FEV1 1.67 L, 66%. declined from prior. ACT 
18

 

 PFT    Status: Completed 2014



     Comments: Mild Obstruction. FEV1 1.84 L, 79%. improved from may 2014

 

 PFT    Status: Completed 6-Oct-2014



     Comments: Mild Obstruction. FEV1 1.77 L, 70%

 

 PFT    Status: Completed 2015



     Comments: Mild Obstruction. FEV1 1.79 L, 72%. ACT 25

 

 PFT    Status: Completed 2016



     Comments: Minimal Obstruction. No Restriction. Mild Diffusion Defect. FEV1 
1.93, 78%. TLC 94, DLCO 73

 

 PFT    Status: Completed 2015



     Comments: Mild Obstruction. FEV1 1.83 L, 73%. ACT 8

 

 Polysomnography    Status: Completed 27-Oct-2010



     Comments: severe AHI 49/hr, LS 73%

 

 Stent placement    Status: Completed



     Comments: coronary artery X2

 

 Tonsillectomy    Status: Completed 









 PRE AND POST (87070)Result: Hemoptysis: No   Status: Completed 9-Sep-2019

 

 PRE AND POST (50061)Result: Hemoptysis: No   Status: Completed 1-Mar-2019

 

 PRE AND POST (41399)Result: Hemoptysis: No   Status: Completed 2018

 

 CT THORAX W/O DYE (46386)Result: Are you pregnant or   Status: Completed 2018



 could you become pregnant?: No; When was you last  



 CXR/CT?: over week ago; Radiology Technician: JOHANNE Barron  

 

 PRE AND POST (40093)Result: Hemoptysis: No   Status: Completed 15-Nov-2017

 

 CHEST X-RAY, PA AND LATERAL (12491)Result: Are you   Status: Completed 29-Sep-
2017



 pregnant or could you become pregnant?: No; When was  



 you last CXR/CT?: over week ago; Radiology Technician:  



 JOHANNE Barron  

 

 CHEST X-RAY, PA AND LATERAL (16955)Result: Are you   Status: Completed 2016



 pregnant or could you become pregnant?: No; When was  



 you last CXR/CT?: OVER WEEK AGO; Radiology Technician:  



 JOHANNE Barron  

 

 ACT: ASSESSMENT OF DISEASE: ASTHMA SYMPTOMS EVALUATE   Status: Completed 2015



 (1005F)Result: [Questions] In the past 4 weeks, how  



 much of the time did your asthma keep you from getting  



 as much done at work, school, or home?: 5; During the  



 past 4 weeks, how often have you had shortness of  



 breath?: 5; During the past 4 weeks, how often did  



 your asthma symptoms (wheezing, coughing, shortness of  



 breath, chest tightness, or pain) wake you up at night  



 or earlier than usual in the morning?: 5; During the  



 past 4 weeks, how often have you used your rescue  



 inhaler or nebulizer medication (such as albuterol)?:  



 5; How would you rate your asthma control during the  



 past 4 weeks?: 5 [Total ACT Score] Score: 25  

 

 ACT: ASSESSMENT OF DISEASE: ASTHMA SYMPTOMS EVALUATE   Status: Completed 2015



 (1005F)Result: [Questions] In the past 4 weeks, how  



 much of the time did your asthma keep you from getting  



 as much done at work, school, or home?: 2; During the  



 past 4 weeks, how often have you had shortness of  



 breath?: 1; During the past 4 weeks, how often did  



 your asthma symptoms (wheezing, coughing, shortness of  



 breath, chest tightness, or pain) wake you up at night  



 or earlier than usual in the morning?: 1; During the  



 past 4 weeks, how often have you used your rescue  



 inhaler or nebulizer medication (such as albuterol)?:  



 1; How would you rate your asthma control during the  



 past 4 weeks?: 3 [Total ACT Score] Score: 8  

 

 ACT: ASSESSMENT OF DISEASE: ASTHMA SYMPTOMS EVALUATE   Date: 6-Oct-2014



 (1005F)Result: [Questions] In the past 4 weeks, how  



 much of the time did your asthma keep you from getting  



 as much done at work, school, or home?: 5; During the  



 past 4 weeks, how often have you had shortness of  



 breath?: 4; During the past 4 weeks, how often did  



 your asthma symptoms (wheezing, coughing, shortness of  



 breath, chest tightness, or pain) wake you up at night  



 or earlier than usual in the morning?: 5; During the  



 past 4 weeks, how often have you used your rescue  



 inhaler or nebulizer medication (such as albuterol)?:  



 4; How would you rate your asthma control during the  



 past 4 weeks?: 4  

 

 ACT: ASSESSMENT OF DISEASE: ASTHMA SYMPTOMS EVALUATE   Status: Completed 2014



 (1005F)Result: [Questions] In the past 4 weeks, how  



 much of the time did your asthma keep you from getting  



 as much done at work, school, or home?: 5; During the  



 past 4 weeks, how often have you had shortness of  



 breath?: 4; During the past 4 weeks, how often did  



 your asthma symptoms (wheezing, coughing, shortness of  



 breath, chest tightness, or pain) wake you up at night  



 or earlier than usual in the morning?: 5; During the  



 past 4 weeks, how often have you used your rescue  



 inhaler or nebulizer medication (such as albuterol)?:  



 3; How would you rate your asthma control during the  



 past 4 weeks?: 4 [Total ACT Score] Score: 17  

 

 ACT: ASSESSMENT OF DISEASE: ASTHMA SYMPTOMS EVALUATE   Status: Completed 30-May
-2014



 (1005F)Result: [Questions] In the past 4 weeks, how  



 much of the time did your asthma keep you from getting  



 as much done at work, school, or home?: 4; During the  



 past 4 weeks, how often have you had shortness of  



 breath?: 3; During the past 4 weeks, how often did  



 your asthma symptoms (wheezing, coughing, shortness of  



 breath, chest tightness, or pain) wake you up at night  



 or earlier than usual in the morning?: 5; During the  



 past 4 weeks, how often have you used your rescue  



 inhaler or nebulizer medication (such as albuterol)?:  



 3; How would you rate your asthma control during the  



 past 4 weeks?: 3 [Total ACT Score] Score: 18  

 

 ACT: ASSESSMENT OF DISEASE: ASTHMA SYMPTOMS EVALUATE   Date: 27-Mar-2013



 (1005F)Result: [Questions] In the past 4 weeks, how  



 much of the time did your asthma keep you from getting  



 as much done at work, school, or home?: 5; During the  



 past 4 weeks, how often have you had shortness of  



 breath?: 5; During the past 4 weeks, how often did  



 your asthma symptoms (wheezing, coughing, shortness of  



 breath, chest tightness, or pain) wake you up at night  



 or earlier than usual in the morning?: 5; During the  



 past 4 weeks, how often have you used your rescue  



 inhaler or nebulizer medication (such as albuterol)?:  



 5; How would you rate your asthma control during the  



 past 4 weeks?: 5 [Total ACT Score] Score: 25  

 

 RESPIRATORY FLOW VOLUME LOOP (47424)Result:   Date: 27-Sep-2011



 Hemoptysis: No; Medication Used: Albuterol 0.083% sol  



 aerosol; Respiratory Technician: Toney Bhatia RRT  







Immunizations







 Influenza (3 years and up)  On: 2011  

 

 Influenza (3 years and up)  On: 2012  

 

 Influenza (3 years and up)  On: 2013  

 

 Influenza (3 years and up)  On: 22-Sep-2014  

 

 Influenza (3 years and up)  On: 1-Oct-2015  Comments:date approximate

 

 Influenza (3 years and up)  On: Sep-2016  

 

 Influenza (3 years and up)  On: 27-Sep-2017  Comments:pharmacy







Family History







 COPD  Status: Active  Comments: Sister.

 

 Sleep Apnea  Status: Active  Comments: Mother. Father.







Social History







 Alcohol use: Occasional alcohol use.    

 

 Caffeine use    Comments: rarely

 

 Marital status: .    

 

 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 




 including clerical.    

 

 Tobacco use: Former smoker.    Comments: max 3 ppd; 13-38









 Former smoker    









 Female







Plan of Treatment







 AIRFLOW RESISTANCE MEASUREMENT:  Start: 9-Mar-2020  Intent



 PULM FUNCT TEST OSCILLOMETRY    



 (19026)    

 

 CXR PA & LAT (59935)  Start: 9-Mar-2020  Intent

 

 DLCO (CARBON MONOXIDE DIFFUSING  Start: 9-Mar-2020  Intent



 CAPACITY) (61565)    

 

 EXHALED GAS NITRIC OXIDE  Start: 9-Mar-2020  Intent



 MEASUREMENT (MOLES/VOLUME) (87818)    

 

 PRE AND POST (04108)  Start: 9-Mar-2020  Intent

 

 THORACIC GAS VOLUME: AIRWAY CLOSING  Start: 9-Mar-2020  Intent



 VOLUME MEASUREMENT: PULM FUNCTION    



 TEST BY GAS (45363)    

 

 TOTAL VITAL CAPACITY (15561)  Start: 9-Mar-2020  Intent

 

 EXHALED NITRIC OXIDE MEASUREMENT  Start: 2018  Intent



 (15647)    Comments: Evaluated patient's nitric oxide. The results were 14  ppb

 

 REST OXIMETRY (07818)  Start: 2018  Intent

 

 EXHALED NITRIC OXIDE MEASUREMENT  Start: 2018  Intent



 (81375)    Comments: next visit

 

 EXHALED NITRIC OXIDE MEASUREMENT  Start: 2018  Intent



 (35968)    Comments: Evaluated patient's nitric oxide. The results were 5 ppb.

 

 REST OXIMETRY (37947)  Start: 14-Dec-2016  Intent

 

 ACT: ASSESSMENT OF DISEASE: ASTHMA  Start: 14-Dec-2016  Intent



 SYMPTOMS EVALUATE (1005F)    

 

 RESPIRATORY FLOW VOLUME LOOP  Start: 2014  Intent



 (97171)    

 

 PRE AND POST (69939)  Start: 2014  Intent

 

 RESPIRATORY FLOW VOLUME LOOP  Start: 1-Oct-2013  Intent



 (01967)    

 

 PRE AND POST (97212)  Start: 1-Oct-2013  Intent

 

 RESPIRATORY FLOW VOLUME LOOP  Start: 27-Mar-2013  Intent



 (60755)    

 

 REST OXIMETRY (64431)  Start: 27-Mar-2013  Intent

 

 PRE AND POST (61932)  Start: 27-Mar-2013  Intent

 

 REST/EXERCISE OXIMETRY (13185)  Start: 27-Mar-2012  Intent

 

 RESPIRATORY FLOW VOLUME LOOP  Start: 27-Mar-2012  Intent



 (65386)    

 

 Pre/Post (01401)  Start: 27-Mar-2012  Intent

 

 Pre/Post (61557)  Start: 27-Sep-2011  Intent









 Medical; CHEST X-RAY - CXR- SCRIPT NEEDS TO BE FAXED TO [facility]  Start: 4-Mar-
2020 10:15  Appointment Request



 [facility]  [facility], XRay  

 

 Medical; FULL PFT  30 - DLCO  Start: 4-Mar-2020 11:00  Appointment Request



 [facility]  [facility], [facility]  

 

 Medical; FOLLOW UP 30 - 6 mnth fu, Full, DLCO, CXR 15/30  Start: 4-Mar-2020 11:
30  Appointment Request



 [facility]  Kirti, MSN, NPC [facility]  









 ASTHMA-COPD OVERLAP SYNDROME : FU EITHER 15/30- Dr. Bryan Layne patient    



 Indication:ASTHMA-COPD OVERLAP SYNDROME    

 

 OBESITY (Renamed from OBESE) : Weight loss encouraged    



 Indication:OBESITY (Renamed from OBESE)    

 

 GASTROESOPHAGEAL REFLUX DISEASE : Avoid triggers    



 Indication:GASTROESOPHAGEAL REFLUX DISEASE    

 

 ASTHMA-COPD OVERLAP SYNDROME : FU EITHER 15/30- Dr. Bryan Layne patient    



 Indication:ASTHMA-COPD OVERLAP SYNDROME    

 

 OBESITY (Renamed from OBESE) : Weight loss encouraged    



 Indication:OBESITY (Renamed from OBESE)    

 

 GASTROESOPHAGEAL REFLUX DISEASE : Avoid triggers    



 Indication:GASTROESOPHAGEAL REFLUX DISEASE    

 

 ASTHMA (Renamed from AIRWAY HYPERREACTIVITY) : FU EITHER 15/30- Dr. Bryan Layne patient    



 Indication:ASTHMA (Renamed from AIRWAY HYPERREACTIVITY)    

 

 OBESITY (Renamed from OBESE) : Weight loss encouraged    



 Indication:OBESITY (Renamed from OBESE)    

 

 ASTHMA (Renamed from AIRWAY HYPERREACTIVITY) : FU NP 30- Dr. Bryan Layne 
patient    



 Indication:ASTHMA (Renamed from AIRWAY HYPERREACTIVITY)    

 

 ASTHMA (Renamed from AIRWAY HYPERREACTIVITY) : Change Qvar dosing- allergist 
wanted 2 puffs mid day    



 Indication:ASTHMA (Renamed from AIRWAY HYPERREACTIVITY)    

 

 GASTROESOPHAGEAL REFLUX DISEASE : Avoid triggers    



 Indication:GASTROESOPHAGEAL REFLUX DISEASE    

 

 OBESITY (Renamed from OBESE) : Weight loss may help    



 Indication:OBESITY (Renamed from OBESE)    

 

 ASTHMA (Renamed from AIRWAY HYPERREACTIVITY) : Follow up at [facility]    



 Indication:ASTHMA (Renamed from AIRWAY HYPERREACTIVITY)    

 

 ASTHMA (Renamed from AIRWAY HYPERREACTIVITY) : FU EITHER 15/30- Dr. Bryan Layne patient    



 Indication:ASTHMA (Renamed from AIRWAY HYPERREACTIVITY)    

 

 OBESITY (Renamed from OBESE) : Weight loss may help    



 Indication:OBESITY (Renamed from OBESE)    

 

 GASTROESOPHAGEAL REFLUX DISEASE : Avoid triggers    



 Indication:GASTROESOPHAGEAL REFLUX DISEASE    

 

 ASTHMA (Renamed from AIRWAY HYPERREACTIVITY) : JI MD 15- Dr. Bryan Layne    



 Indication:ASTHMA (Renamed from AIRWAY HYPERREACTIVITY)    

 

 OBSTRUCTIVE SLEEP APNEA : Use cpap nightly    



 Indication:OBSTRUCTIVE SLEEP APNEA    

 

 OBESITY (Renamed from OBESE) : Weight loss encouraged    



 Indication:OBESITY (Renamed from OBESE)    

 

 ASTHMA (Renamed from AIRWAY HYPERREACTIVITY) : Obtain CXR and CT chest reports 
from [facility]    



 Indication:ASTHMA (Renamed from AIRWAY HYPERREACTIVITY)    

 

 ASTHMA (Renamed from AIRWAY HYPERREACTIVITY) : FU EITHER 15/30- Dr. Bryan Layne patient    



 Indication:ASTHMA (Renamed from AIRWAY HYPERREACTIVITY)    

 

 GASTROESOPHAGEAL REFLUX DISEASE : Avoid triggers    



 Indication:GASTROESOPHAGEAL REFLUX DISEASE    

 

 ASTHMA (Renamed from AIRWAY HYPERREACTIVITY) : FU EITHER 15/30- iX patient
    



 Indication:ASTHMA (Renamed from AIRWAY HYPERREACTIVITY)    

 

 ASTHMA (Renamed from AIRWAY HYPERREACTIVITY) : Obtain CT chest report from Dr. Ponce- Hem/onc office    



 Indication:ASTHMA (Renamed from AIRWAY HYPERREACTIVITY)    

 

 ASTHMA (Renamed from AIRWAY HYPERREACTIVITY) : Influenza vaccine seasonally - 
current    



 Indication:ASTHMA (Renamed from AIRWAY HYPERREACTIVITY)    

 

 ASTHMA (Renamed from AIRWAY HYPERREACTIVITY) : FU EITHER 15/30- Xi patient
    



 Indication:ASTHMA (Renamed from AIRWAY HYPERREACTIVITY)    

 

 ASTHMA (Renamed from AIRWAY HYPERREACTIVITY) : FU EITHER 15/30- Xi patient
    



 Indication:ASTHMA (Renamed from AIRWAY HYPERREACTIVITY)    

 

 ASTHMA (Renamed from AIRWAY HYPERREACTIVITY) : Follow up in 4 weeks    



 Indication:ASTHMA (Renamed from AIRWAY HYPERREACTIVITY)    

 

 ASTHMA (Renamed from AIRWAY HYPERREACTIVITY) : FU EITHER 15/30-  patient
    



 Indication:ASTHMA (Renamed from AIRWAY HYPERREACTIVITY)    

 

 ALLERGIC RHINITIS : Start sinus rinses    



 Indication:ALLERGIC RHINITIS    

 

 ASTHMA (Renamed from AIRWAY HYPERREACTIVITY) : FU EITHER 15/30-  patient
    



 Indication:ASTHMA (Renamed from AIRWAY HYPERREACTIVITY)    

 

 ASTHMA (Renamed from AIRWAY HYPERREACTIVITY) : Influenza vaccine seasonally - 
current    



 Indication:ASTHMA (Renamed from AIRWAY HYPERREACTIVITY)    

 

 ASTHMA (Renamed from AIRWAY HYPERREACTIVITY) : FU EITHER 15/30-  patient
    



 Indication:ASTHMA (Renamed from AIRWAY HYPERREACTIVITY)    

 

 ASTHMA (Renamed from AIRWAY HYPERREACTIVITY) : FU EITHER 15/30    



 Indication:ASTHMA (Renamed from AIRWAY HYPERREACTIVITY)    

 

 ASTHMA (Renamed from AIRWAY HYPERREACTIVITY) : Follow up in 6 months    



 Indication:ASTHMA (Renamed from AIRWAY HYPERREACTIVITY)    

 

 ASTHMA (Renamed from AIRWAY HYPERREACTIVITY) : Oximetry - Rest and exercise 
next visit    



 Indication:ASTHMA (Renamed from AIRWAY HYPERREACTIVITY)    

 

 GASTROESOPHAGEAL REFLUX DISEASE : Avoid triggers    



 Indication:GASTROESOPHAGEAL REFLUX DISEASE    

 

 GASTROESOPHAGEAL REFLUX DISEASE : Continue current medications    



 Indication:GASTROESOPHAGEAL REFLUX DISEASE    

 

 ALLERGIC RHINITIS : Envirnmental controls    



 Indication:ALLERGIC RHINITIS    

 

 ALLERGIC RHINITIS : Continue current medications    



 Indication:ALLERGIC RHINITIS    

 

 OBESITY (Renamed from OBESE) : Weight Loss    



 Indication:OBESITY (Renamed from OBESE)    

 

 ASTHMA (Renamed from AIRWAY HYPERREACTIVITY) : Call with symptoms    



 Indication:ASTHMA (Renamed from AIRWAY HYPERREACTIVITY)    

 

 ASTHMA (Renamed from AIRWAY HYPERREACTIVITY) : Influenza vaccine in     



 Indication:ASTHMA (Renamed from AIRWAY HYPERREACTIVITY)    

 

 ASTHMA (Renamed from AIRWAY HYPERREACTIVITY) : Continue current medications    



 Indication:ASTHMA (Renamed from AIRWAY HYPERREACTIVITY)    

 

 OBSTRUCTIVE SLEEP APNEA : Discussed possible health risks associated with 
untreated sleep apnea    



 Indication:OBSTRUCTIVE SLEEP APNEA    

 

 OBSTRUCTIVE SLEEP APNEA : Discussed possible cardiovascular effects of 
untreated sleep apnea    



 Indication:OBSTRUCTIVE SLEEP APNEA    

 

 OBSTRUCTIVE SLEEP APNEA : Discussed need to notify all physicians of their 
diagnosis    



 Indication:OBSTRUCTIVE SLEEP APNEA    

 

 OBSTRUCTIVE SLEEP APNEA : Discussed long term use    



 Indication:OBSTRUCTIVE SLEEP APNEA    

 

 OBSTRUCTIVE SLEEP APNEA : Discussed the importance of compliance    



 Indication:OBSTRUCTIVE SLEEP APNEA    

 

 OBSTRUCTIVE SLEEP APNEA : Cpap Supplies    



 Indication:OBSTRUCTIVE SLEEP APNEA    

 

 OBSTRUCTIVE SLEEP APNEA : Continue with Cpap therapy    



 Indication:OBSTRUCTIVE SLEEP APNEA    







Results







 No Known Results    

 

   No Result Information Available  







Vital Signs







 9-Sep-2019 11:15    

 

   Temperature 97.1 f  Comments: Method: Tympanic

 

   Pulse 86 /min  Comments: Pattern: Regular

 

   Respiration Rate 16 /min  Comments: Pattern: Unlabored

 

   O2 SAT 98 %  Comments: Room air

 

   BP Systolic 130 mm[Hg]  Comments: Patient Position: Sitting; Cuff



     Location: Left Arm; Cuff Size: Standard

 

   BP Diastolic 72 mm[Hg]  Comments: Patient Position: Sitting; Cuff



     Location: Left Arm; Cuff Size: Standard

 

   Weight 238 lb  

 

   Height 66 in  

 

   BMI 38.41 kg/m2  

 

   BSA 2.15 m2  

 

 1-Mar-2019 12:52    

 

   Temperature 97.5 f  Comments: Method: Tympanic

 

   Pulse 79 /min  Comments: Pattern: Regular

 

   Respiration Rate 14 /min  Comments: Pattern: Unlabored

 

   O2 SAT 98 %  Comments: Room air

 

   BP Systolic 140 mm[Hg]  Comments: Patient Position: Sitting; Cuff



     Location: Left Arm; Cuff Size: Standard

 

   BP Diastolic 74 mm[Hg]  Comments: Patient Position: Sitting; Cuff



     Location: Left Arm; Cuff Size: Standard

 

   Weight 250 lb  

 

   Height 66 in  

 

   BMI 40.35 kg/m2  

 

   BSA 2.2 m2  

 

 2018 11:34    

 

   Temperature 96.6 f  Comments: Method: Tympanic

 

   Pulse 64 /min  Comments: Pattern: Regular

 

   Respiration Rate 14 /min  Comments: Pattern: Unlabored

 

   O2 SAT 97 %  Comments: Room air

 

   BP Systolic 146 mm[Hg]  Comments: Patient Position: Sitting; Cuff



     Location: Right Arm; Cuff Size: Large

 

   BP Diastolic 70 mm[Hg]  Comments: Patient Position: Sitting; Cuff



     Location: Right Arm; Cuff Size: Large

 

   Weight 260 lb  

 

   Height 66 in  

 

   BMI 41.96 kg/m2  

 

   BSA 2.24 m2  

 

 2018 12:56    

 

   Temperature 97.2 f  Comments: Method: Tympanic

 

   Pulse 76 /min  Comments: Pattern: Regular

 

   Respiration Rate 16 /min  Comments: Pattern: Unlabored

 

   O2 SAT 98 %  Comments: Room air

 

   BP Systolic 124 mm[Hg]  Comments: Patient Position: Sitting; Cuff



     Location: Left Arm; Cuff Size: Standard

 

   BP Diastolic 80 mm[Hg]  Comments: Patient Position: Sitting; Cuff



     Location: Left Arm; Cuff Size: Standard

 

   Weight 256 lb  

 

   Height 66 in  

 

   BMI 41.32 kg/m2  

 

   BSA 2.22 m2  

 

 15-Nov-2017 10:37    Comments: Exertional SaO2 on room air -



     96%

 

   Temperature 98 f  Comments: Method: Tympanic

 

   Pulse 93 /min  Comments: Pattern: Regular

 

   Respiration Rate 14 /min  Comments: Pattern: Unlabored

 

   O2 SAT 98 %  Comments: Room air

 

   BP Systolic 126 mm[Hg]  Comments: Patient Position: Sitting; Cuff



     Location: Left Arm; Cuff Size: Standard

 

   BP Diastolic 78 mm[Hg]  Comments: Patient Position: Sitting; Cuff



     Location: Left Arm; Cuff Size: Standard

 

   Weight 251 lb  

 

   Height 66 in  

 

   BMI 40.51 kg/m2  

 

   BSA 2.2 m2  

 

 29-Sep-2017 11:00    

 

   Temperature 97.4 f  Comments: Method: Tympanic

 

   Pulse 84 /min  Comments: Pattern: Regular

 

   Respiration Rate 16 /min  Comments: Pattern: Unlabored

 

   O2 SAT 97 %  Comments: Room air

 

   BP Systolic 124 mm[Hg]  Comments: Patient Position: Sitting; Cuff



     Location: Left Arm; Cuff Size: Standard

 

   BP Diastolic 68 mm[Hg]  Comments: Patient Position: Sitting; Cuff



     Location: Left Arm; Cuff Size: Standard

 

   Weight 249 lb  

 

   Height 66 in  

 

   BMI 40.19 kg/m2  

 

   BSA 2.2 m2  

 

 14-Dec-2016 11:54    

 

   Temperature 97.1 f  Comments: Method: Tympanic

 

   Pulse 68 /min  Comments: Pattern: Regular

 

   Respiration Rate 12 /min  Comments: Pattern: Unlabored

 

   O2 SAT 97 %  Comments: Room air

 

   BP Systolic 136 mm[Hg]  Comments: Patient Position: Sitting; Cuff



     Location: Left Arm; Cuff Size: Standard

 

   BP Diastolic 78 mm[Hg]  Comments: Patient Position: Sitting; Cuff



     Location: Left Arm; Cuff Size: Standard

 

   Weight 246 lb  

 

   Height 66 in  

 

   BMI 39.71 kg/m2  

 

   BSA 2.18 m2  

 

 2016 10:14    

 

   Temperature 97.8 f  Comments: Method: Tympanic

 

   Pulse 66 /min  Comments: Pattern: Regular

 

   Respiration Rate 16 /min  Comments: Pattern: Unlabored

 

   O2 SAT 97 %  Comments: Room air

 

   BP Systolic 132 mm[Hg]  Comments: Patient Position: Sitting; Cuff



     Location: Right Arm; Cuff Size: Large

 

   BP Diastolic 76 mm[Hg]  Comments: Patient Position: Sitting; Cuff



     Location: Right Arm; Cuff Size: Large

 

   Weight 227 lb  

 

   Height 66 in  

 

   BMI 36.64 kg/m2  

 

   BSA 2.11 m2  

 

 2015 8:44    

 

   Temperature 97.7 f  Comments: Method: Tympanic

 

   Pulse 59 /min  Comments: Pattern: Regular

 

   Respiration Rate 16 /min  Comments: Pattern: Unlabored

 

   O2 SAT 99 %  Comments: Room air

 

   BP Systolic 134 mm[Hg]  Comments: Patient Position: Sitting; Cuff



     Location: Left Arm; Cuff Size: Standard

 

   BP Diastolic 78 mm[Hg]  Comments: Patient Position: Sitting; Cuff



     Location: Left Arm; Cuff Size: Standard

 

   Weight 208 lb  

 

   Height 66 in  

 

   BMI 33.57 kg/m2  

 

   BSA 2.03 m2  

 

 2015 10:50    

 

   Temperature 98.2 f  Comments: Method: Tympanic

 

   Pulse 59 /min  Comments: Pattern: Regular

 

   Respiration Rate 12 /min  Comments: Pattern: Unlabored

 

   O2 SAT 98 %  Comments: Room air

 

   BP Systolic 140 mm[Hg]  Comments: Patient Position: Sitting; Cuff



     Location: Left Arm; Cuff Size: Large

 

   BP Diastolic 60 mm[Hg]  Comments: Patient Position: Sitting; Cuff



     Location: Left Arm; Cuff Size: Large

 

   Weight 225 lb  

 

   Height 66 in  

 

   BMI 36.32 kg/m2  

 

   BSA 2.1 m2  

 

 6-Oct-2014 14:42    

 

   Temperature 96.8 f  Comments: Method: Tympanic

 

   Pulse 61 /min  Comments: Pattern: Regular

 

   Respiration Rate 18 /min  Comments: Pattern: Unlabored

 

   O2 SAT 96 %  Comments: Room air

 

   BP Systolic 122 mm[Hg]  Comments: Patient Position: Sitting; Cuff



     Location: Left Arm; Cuff Size: Standard

 

   BP Diastolic 68 mm[Hg]  Comments: Patient Position: Sitting; Cuff



     Location: Left Arm; Cuff Size: Standard

 

   Weight 240 lb  

 

   Height 66 in  

 

   BMI 38.74 kg/m2  

 

   BSA 2.16 m2  

 

 2014 11:21    

 

   Temperature 97.4 f  Comments: Method: Tympanic

 

   Pulse 70 /min  Comments: Pattern: Regular

 

   Respiration Rate 18 /min  Comments: Pattern: Unlabored

 

   O2 SAT 96 %  Comments: Room air

 

   BP Systolic 108 mm[Hg]  Comments: Patient Position: Sitting; Cuff



     Location: Left Arm; Cuff Size: Standard

 

   BP Diastolic 68 mm[Hg]  Comments: Patient Position: Sitting; Cuff



     Location: Left Arm; Cuff Size: Standard

 

   Weight 235 lb  

 

   Height 64 in  

 

   BMI 40.34 kg/m2  

 

   BSA 2.09 m2  

 

 30-May-2014 12:18    Comments: Exertional SaO2 -96%

 

   Temperature 96.8 f  Comments: Method: Tympanic

 

   Pulse 68 /min  Comments: Pattern: Regular

 

   Respiration Rate 18 /min  Comments: Pattern: Unlabored

 

   O2 SAT 98 %  Comments: Room air

 

   BP Systolic 128 mm[Hg]  Comments: Patient Position: Sitting; Cuff



     Location: Left Arm; Cuff Size: Standard

 

   BP Diastolic 68 mm[Hg]  Comments: Patient Position: Sitting; Cuff



     Location: Left Arm; Cuff Size: Standard

 

   Weight 239 lb  

 

   Height 66 in  

 

   BMI 38.58 kg/m2  

 

   BSA 2.16 m2  

 

 1-Oct-2013 10:06    

 

   Temperature 98 f  Comments: Method: Tympanic

 

   Pulse 58 /min  Comments: Pattern: Regular

 

   Respiration Rate 16 /min  Comments: Pattern: Unlabored

 

   BP Systolic 152 mm[Hg]  Comments: Patient Position: Sitting; Cuff



     Location: Left Arm; Cuff Size: Large

 

   BP Diastolic 60 mm[Hg]  Comments: Patient Position: Sitting; Cuff



     Location: Left Arm; Cuff Size: Large

 

   Weight 227 lb  

 

   Height 64 in  

 

   BMI 38.96 kg/m2  

 

   BSA 2.06 m2  

 

 27-Mar-2013 14:52    Comments: exertional O2-94% RA

 

   Temperature 97.6 f  Comments: Method: Tympanic

 

   Pulse 71 /min  Comments: Pattern: Regular

 

   Respiration Rate 18 /min  Comments: Pattern: Unlabored

 

   O2 SAT 98 %  Comments: Room air

 

   BP Systolic 118 mm[Hg]  Comments: Patient Position: Sitting; Cuff



     Location: Left Arm; Cuff Size: Standard

 

   BP Diastolic 72 mm[Hg]  Comments: Patient Position: Sitting; Cuff



     Location: Left Arm; Cuff Size: Standard

 

   Weight 232 lb  

 

   Height 64 in  

 

   BMI 39.82 kg/m2  

 

   BSA 2.08 m2  

 

 27-Sep-2012 13:06    

 

   Temperature 98.5 f  Comments: Method: Tympanic

 

   Pulse 62 /min  Comments: Pattern: Regular

 

   Respiration Rate 14 /min  Comments: Pattern: Unlabored

 

   BP Systolic 124 mm[Hg]  Comments: Patient Position: Sitting; Cuff



     Location: Left Arm; Cuff Size: Standard

 

   BP Diastolic 60 mm[Hg]  Comments: Patient Position: Sitting; Cuff



     Location: Left Arm; Cuff Size: Standard

 

   Weight 225 lb  

 

   Height 66 in  

 

   BMI 36.32 kg/m2  

 

   BSA 2.1 m2  

 

 27-Sep-2012 12:58    Comments: Exertional SaO2 -95%

 

   O2 SAT 98 %  Comments: Room air

 

 27-Mar-2012 12:42    

 

   Temperature 96.8 f  Comments: Method: Tympanic

 

   Pulse 68 /min  Comments: Pattern: Regular

 

   Respiration Rate 20 /min  Comments: Pattern: Unlabored

 

   O2 SAT 98 %  Comments: Room air

 

   BP Systolic 122 mm[Hg]  Comments: Patient Position: Sitting; Cuff



     Location: Left Arm; Cuff Size: Standard

 

   BP Diastolic 78 mm[Hg]  Comments: Patient Position: Sitting; Cuff



     Location: Left Arm; Cuff Size: Standard

 

   Weight 227 lb  

 

   Height 66 in  

 

   BMI 36.64 kg/m2  

 

   BSA 2.11 m2  

 

 27-Sep-2011 13:26    

 

   Temperature 97.8 f  Comments: Method: Tympanic

 

   Pulse 80 /min  Comments: Pattern: Regular

 

   Respiration Rate 18 /min  Comments: Pattern: Unlabored

 

   O2 SAT 98 %  Comments: Room air

 

   BP Systolic 118 mm[Hg]  Comments: Patient Position: Sitting; Cuff



     Location: Right Arm; Cuff Size: Standard

 

   BP Diastolic 64 mm[Hg]  Comments: Patient Position: Sitting; Cuff



     Location: Right Arm; Cuff Size: Standard

 

   Weight 249 lb  

 

   Height 64 in  

 

   BMI 42.74 kg/m2  

 

   BSA 2.15 m2  







Advance Directives







 HIPAA - Effective on 2017. Expiration date unspecified.  Effective: 29-Sep
-2017  



 Scanned Document is available upon request.    







Encounters







 Office Visit  9-Sep-2019 11:15  To 9-Sep-2019 11:51  



 Encounter Reason:                                     ASTHMA, FOLLOW UP - The 
last clinic visit was 6 month(s) ago. Management changes made at the last [facility] Office  



 it include none (tried dose reduction on ISC with samples, didn't tolerate). 
The patient's asthma causes daytime symptoms 1 to 2 times per week. The patient'
s asthma is not disturbing sleep. Symptoms in    



 clude wheezing, shortness of breath and productive cough (with clear sputum), 
while symptoms do not include chest tightness, non-productive cough or chest 
pain. The patient describes the difficulty ivelisse    



 thing as dyspnea on exertion. Onset of symptoms was gradual year(s) ago. The 
episodes occur weekly. The patient describes this as unchanged (although she 
was recenly on oral steroids for exacerbation re    



 lated to allergy symptoms. still on xolair). Symptoms are exacerbated by cold 
temperature and activity (also allergy symptoms). Symptoms are relieved by 
inhaler use, rest and oral steroids (with acute e    



 pisodes. most recently summer 2019 and summer 2018). Associated symptoms 
include allergy symptoms, while associated symptoms do not include fever or 
upper respiratory infection symptoms. Current treatme    



 nt includes inhaled albuterol, inhaled long-acting beta-2 agonists, inhaled 
corticosteroids (dulera 200/5mcg, 2 puffs bid), inhaled anticholinergics (
spiriva respimat 2.5mcg), leukotriene modifiers and    



 omalizumab (Xolair) (started 2019 with allergist, q 2 week dosing). 
Bronchodilator use is becoming more frequent (with recent exacerbation 2-3 times
/day max). By report there is good compliance with    



  treatment, good tolerance of treatment and fair symptom control. Pertinent 
medical history includes other pulmonary disease, allergic rhinitis and 
gastroesophageal reflux. The patient has been exposed    



 to animal dander (1 dog), while the patient has not been exposed to wood 
burning stove, mold/mildew in the home or tobacco smoke. Past evaluation has 
included chest CT (2018, emphysematous changes).,    



  [ADDITIONAL REASON] Sleep follow up - The sleep disorder is characterized as 
obstructive sleep apnea. The patient gets approximately 9 hours of sleep per 
night. Current treatment includes CPAP therapy    



 (home care company is in [place]). Current symptoms do not include excessive 
daytime sleepiness, snoring (sleeps alone, denies snorting arousals) or weight 
gain. The patient describes this as unchanged    



 . Symptoms are relieved by CPAP use. Associated symptoms do not include 
morning headaches. Since diagnosis the disease has been unchanged. Initial 
diagnosis of sleep disorder was year(s) ago. Recently t    



 he disease has been unchanged. Past evaluation has included nighttime sleep 
study. Past treatment has included CPAP therapy.    



 Encounter Diagnosis:                                 ASTHMA-COPD OVERLAP 
SYNDROME, GASTROESOPHAGEAL REFLUX DISEASE, OBESITY (Renamed from OBESE), 
OBSTRUCTIVE SLEEP APNEA    

 

 Office Visit  1-Mar-2019 12:04  To 1-Mar-2019 13:34  



 Encounter Reason:                                     ASTHMA, FOLLOW UP - The 
last clinic visit was 6 month(s) ago. Management changes made at the last [facility] Office  



 it include stopping qvar. The patient's asthma causes daytime symptoms 1 to 2 
times per week. The patient's asthma is not disturbing sleep. Symptoms include 
shortness of breath and productive cough (wit    



 h clear sputum), while symptoms do not include wheezing, chest tightness, non-
productive cough or chest pain. The patient describes the difficulty breathing 
as dyspnea on exertion. Onset of symptoms was    



  gradual year(s) ago. The episodes occur weekly. The patient describes this as 
improving (started xolair with allergist 6 weeks ago, already noticing benefit 
with mostly a reduction in cough). Symptoms    



 are exacerbated by cold temperature and activity (also allergy symptoms). 
Symptoms are relieved by inhaler use, rest and oral steroids (with acute 
episodes. most recently summer 2018). Associated sympto    



 ms include allergy symptoms (sporadic pnd), while associated symptoms do not 
include fever or upper respiratory infection symptoms. Current treatment 
includes inhaled albuterol, inhaled long-acting beta    



 -2 agonists, inhaled corticosteroids (dulera 2 puffs bid), inhaled 
anticholinergics (spiriva respimat 2.5mcg), leukotriene modifiers and 
omalizumab (Xolair) (started 2019 with allergist). Bronchodil    



 ator use is becoming less frequent (hasn't needed). By report there is good 
compliance with treatment, good tolerance of treatment and good symptom 
control. Pertinent medical history includes other pulm    



 onary disease (has sleep apnea, using auto titrating cpap at bedtime. home 
care company is [employer]. more rested with cpap), allergic rhinitis 
and gastroesophageal reflux. The patient has be    



 en exposed to animal dander (1 dog), while the patient has not been exposed to 
wood burning stove, mold/mildew in the home or tobacco smoke. Past evaluation 
has included chest CT (2018, emphysematous changes).    



 Encounter Diagnosis:                                 ASTHMA-COPD OVERLAP 
SYNDROME, OBSTRUCTIVE SLEEP APNEA, OBESITY (Renamed from OBESE), 
GASTROESOPHAGEAL REFLUX DISEASE    

 

 Office Visit  2018 11:18  To 2018 12:24  



 Encounter Reason:                                     ASTHMA, FOLLOW UP - The 
last clinic visit was 6 month(s) ago. No changes in management were made at [facility]  



 he last visit. The patient's asthma causes daytime symptoms most days. The 
patient's asthma is not disturbing sleep. Symptoms include wheezing, shortness 
of breath and productive cough (with clear sputu    



 m), while symptoms do not include chest tightness, non-productive cough or 
chest pain. The patient describes the difficulty breathing as dyspnea on 
exertion. Onset of symptoms was gradual year(s) ago. T    



 he episodes occur daily. The patient describes this as unchanged. Symptoms are 
exacerbated by cold temperature and activity (also allergy symptoms). Symptoms 
are relieved by inhaler use, rest and oral s    



 teroids (with acute episodes in 2017 and oct 2017). Associated symptoms 
include allergy symptoms (sporadic pnd), while associated symptoms do not 
include fever or upper respiratory infection symp    



 toms. Current treatment includes inhaled albuterol (has not used, has had flu 
vaccine), inhaled long-acting beta-2 agonists, inhaled corticosteroids (dulera 
2 puffs bid and with qvar 2 puffs noon), inha    



 led anticholinergics (spiriva respimat daily) and leukotriene modifiers (
singulair daily). Bronchodilator use is staying the same. By report there is 
good compliance with treatment, good tolerance of tr    



 eatment and good symptom control. Pertinent medical history includes other 
pulmonary disease (has sleep apnea, using auto titrating cpap at bedtime. home 
care company is [employer]), allergic    



 rhinitis and gastroesophageal reflux. The patient has been exposed to animal 
dander (1 dog), while the patient has not been exposed to wood burning stove, 
mold/mildew in the home or tobacco smoke. Past    



 evaluation has included chest CT (2018, emphysematous changes).    



 Encounter Diagnosis:                                 ASTHMA (Renamed from 
AIRWAY HYPERREACTIVITY), OBSTRUCTIVE SLEEP APNEA, OBESITY (Renamed from OBESE) 
   

 

 Order Only  10-Kuldip-2018 8:50  To 10-Kuldip-2018 8:51  



 Encounter Diagnosis:                                 ASTHMA (Renamed from 
AIRWAY HYPERREACTIVITY)  [facility] Office  

 

 Office Visit  2018 12:51  To 2018 13:18  



 Encounter Reason:                                     ASTHMA, FOLLOW UP - The 
last clinic visit was 2 month(s) ago. Management changes made at the last vis  
[facility]  



 it include none (Increase Spiriva respimat to 2.5 

g formulation, to use Qvar midday, and to use flutter valve, fluid intake and 
Mucinex as prescribed.). The patient's asthma causes daytime symptoms    



 most days. The patient's asthma is not disturbing sleep. Symptoms include 
wheezing (decreased since last appt.), shortness of breath (improved, s/s only 
with exertion such as walking quickly) and produc    



 tive cough (was continuous and now sporadic, 2-3x/day, clear sputum), while 
symptoms do not include chest tightness, non-productive cough or chest pain. 
The patient describes the difficulty breathing as    



  dyspnea on exertion. Onset of symptoms was gradual year(s) ago. The episodes 
occur daily. The patient describes this as improving. Symptoms are exacerbated 
by cold temperature and activity (also allerg    



 y symptoms). Symptoms are relieved by inhaler use, rest and oral steroids (
with acute episodes in 2017 and oct 2017). Associated symptoms include 
allergy symptoms (sporadic pnd), while associated    



  symptoms do not include fever or upper respiratory infection symptoms. 
Current treatment includes inhaled albuterol (has not used, has had flu vaccine)
, inhaled long-acting beta-2 agonists, inhaled cor    



 ticosteroids (dulera 2 puffs bid and with qvar 2 puffs noon), inhaled 
anticholinergics (spiriva respimat daily) and leukotriene modifiers (singulair 
daily). Bronchodilator use is becoming less frequent.    



  By report there is good compliance with treatment, good tolerance of 
treatment and good symptom control. Pertinent medical history includes other 
pulmonary disease (has sleep apnea, using auto titratin    



 g cpap q hs), allergic rhinitis and gastroesophageal reflux. The patient has 
been exposed to animal dander (1 dog), while the patient has not been exposed 
to wood burning stove, mold/mildew in the home    



 or tobacco smoke. The patient is currently able to do activities of daily 
living without limitations and able to do housework without limitations (at 
times tired). Past evaluation has included chest x-r    



 ay (had CABG 2015 and we now have image reports. she had post op left 
effusion and had TAP. we don't have TAP results but she is unaware of any 
significant finding. she also had cardiac pacemaker p    



 laced since her last visit here without complication).    



 Encounter Diagnosis:                                 GASTROESOPHAGEAL REFLUX 
DISEASE, OBESITY (Renamed from OBESE), ALLERGIC RHINITIS, OBSTRUCTIVE SLEEP 
APNEA, HYPERTENSION, ASTHMA-COPD OVERLAP SYNDROME    

 

 Historical Summary  2018 11:48  To 2018 11:52  



 Encounter Reason:                                     ASTHMA, FOLLOW UP - The 
last clinic visit was 2 month(s) ago. Management changes made at the last [facility]  



 it include none (Increase Spiriva respimat to 2.5 

g formulation, to use Qvar midday, and to use flutter valve, fluid intake and 
Mucinex as prescribed.). The patient's asthma causes daytime symptoms    



 most days. The patient's asthma is not disturbing sleep. Symptoms include 
wheezing, shortness of breath and productive cough (clear sputum), while 
symptoms do not include chest tightness, non-productive    



  cough or chest pain. The patient describes the difficulty breathing as 
dyspnea on exertion. Onset of symptoms was gradual year(s) ago. The episodes 
occur daily. The patient describes this as worsening    



 (in 2017 with more coughing, more congestion. mucous can be quite thick. hasn'
t tried mucinex. hasn't tired flutter device). Symptoms are exacerbated by cold 
temperature and activity (also allergy sympt    



 oms). Symptoms are relieved by inhaler use, rest and oral steroids (with acute 
episodes in 2017 and oct 2017). Associated symptoms include allergy 
symptoms (pnd), while associated symptoms do not    



  include fever. Current treatment includes inhaled albuterol (prn and has not 
used, has had flu vaccine), inhaled long-acting beta-2 agonists, inhaled 
corticosteroids (dulera 2 puffs bid and now additio    



 nal ICS with qvar 2 puffs in the morning), inhaled anticholinergics (spiriva 
respimat) and leukotriene modifiers. Bronchodilator use is becoming more 
frequent (currently q 4 hrs with some benefit). By r    



 kimberlyn there is good compliance with treatment, good tolerance of treatment and 
fair symptom control. Pertinent medical history includes other pulmonary 
disease (has sleep apnea, using auto titrating cpa    



 p now. home care company is [employer]. sleeps better on cpap and uses 
it nightly. not snoring on cpap that she is aware of), allergic rhinitis and 
gastroesophageal reflux. The patient has been    



 exposed to animal dander (1 dog), while the patient has not been exposed to 
wood burning stove, mold/mildew in the home or tobacco smoke. Past evaluation 
has included chest x-ray (had CABG 2015 and    



  we now have image reports. she had post op left effusion and had TAP. we don'
t have TAP results but she is unaware of any significant finding. she also had 
cardiac pacemaker placed since her last visit here without complication).    

 

 Office Visit  2018 10:02  To 2018 14:01  



 Encounter Diagnosis:                                 ASTHMA (Renamed from 
AIRWAY HYPERREACTIVITY)  [facility]  

 

 Radiology  2018 10:02  To 2018 10:20  



 Encounter Diagnosis:                                 PLEURAL EFFUSION, LEFT  
[facility]  

 

 Office Visit  15-Nov-2017 10:26  To 15-Nov-2017 12:43  



 Encounter Reason:                                     ASTHMA, FOLLOW UP - The 
last clinic visit was 2 month(s) ago. Management changes made at the last [facility] Office  



 it include none (with increased coughing since last visit. has been on abx and 
oral steroids (once each) with some benefit. still not back to baseline. 
started qvar 2 puff daily also with benefit. takin    



 g meds for reflux and allergy. working with allergist as well. will be having 
blood work done with them soon, may consider starting allergy shots again. she 
isn't sure if they are considering xolair). T    



 he patient's asthma causes daytime symptoms most days. The patient's asthma is 
not disturbing sleep. Symptoms include wheezing, shortness of breath and 
productive cough (clear sputum), while symptoms do    



  not include chest tightness, non-productive cough or chest pain. The patient 
describes the difficulty breathing as dyspnea on exertion. Onset of symptoms 
was gradual year(s) ago. The episodes occur yobani    



 ly. The patient describes this as worsening (in 2017 with more coughing, more 
congestion. mucous can be quite thick. hasn't tried mucinex. hasn't tired 
flutter device). Symptoms are exacerbated by cold    



 temperature and activity (also allergy symptoms). Symptoms are relieved by 
inhaler use, rest and oral steroids (with acute episodes in 2017 and oct 
2017). Associated symptoms include allergy symp    



 toms (pnd), while associated symptoms do not include fever. Current treatment 
includes inhaled albuterol (prn and has not used, has had flu vaccine), inhaled 
long-acting beta-2 agonists, inhaled cortico    



 steroids (dulera 2 puffs bid and now additional ICS with qvar 2 puffs in the 
morning), inhaled anticholinergics (spiriva respimat) and leukotriene 
modifiers. Bronchodilator use is becoming more frequent    



  (currently q 4 hrs with some benefit). By report there is good compliance 
with treatment, good tolerance of treatment and fair symptom control. Pertinent 
medical history includes other pulmonary diseas    



 e (has sleep apnea, using auto titrating cpap now. home care company is 
[employer]. sleeps better on cpap and uses it nightly. not snoring on 
cpap that she is aware of), allergic rhinitis and ga    



 stroesophageal reflux. The patient has been exposed to animal dander (1 dog), 
while the patient has not been exposed to wood burning stove, mold/mildew in 
the home or tobacco smoke. Past evaluation has    



 included chest x-ray (had CABG 2015 and we now have image reports. she 
had post op left effusion and had TAP. we don't have TAP results but she is 
unaware of any significant finding. she also had c    



 ardiac pacemaker placed since her last visit here without complication).    



 Encounter Diagnosis:                                 ASTHMA (Renamed from 
AIRWAY HYPERREACTIVITY), GASTROESOPHAGEAL REFLUX DISEASE, ALLERGIC RHINITIS, 
OBSTRUCTIVE SLEEP APNEA, OBESITY (Renamed from OBESE)    

 

 Office Visit  29-Sep-2017 10:02  To 29-Sep-2017 12:23  



 Encounter Reason:                                     ASTHMA, FOLLOW UP - The 
last clinic visit was 9 month(s) ago (primary is Dr. Nicole Murdock. also [facility]  



 eing Dr. Nolen in [place]). No changes in management were made at the last 
visit. The patient's asthma causes daytime symptoms 1 to 2 times per month (
more with URI and/or sinus disease and seen her all    



 ergist recently for more sinus/allergy symptoms. spiriva respimat was added 
which she has found helpful). The patient's asthma is not disturbing sleep. 
Symptoms include wheezing, shortness of breath and    



  productive cough (clear sputum), while symptoms do not include chest tightness
, non-productive cough or chest pain. The patient describes the difficulty 
breathing as dyspnea on exertion. The episodes o    



 ccur daily (just within the past 3 weeks with more sinus symptoms). The 
patient describes this as mild and unchanged. Symptoms are exacerbated by cold 
temperature and activity (also allergy symptoms spo    



 radically). Symptoms are relieved by inhaler use, rest and oral steroids (with 
acute episodes, last was 2017). Associated symptoms include allergy 
symptoms (pnd), while associated symptoms do not    



  include fever. Current treatment includes inhaled albuterol (prn and has not 
used, has had flu vaccine), inhaled long-acting beta-2 agonists, inhaled 
corticosteroids (dulera 2 puffs bid), inhaled antic    



 holinergics (spiriva respimat) and leukotriene modifiers (singulair daily). 
Bronchodilator use is staying the same (hasn't thought to use nebulized 
albuterol with recent symptoms-just forgot about it un    



 til she was doing neb in our office today with spirometry). By report there is 
good compliance with treatment, good tolerance of treatment and fair symptom 
control. Pertinent medical history includes ot    



 her pulmonary disease (has sleep apnea, using auto titrating cpap now. home 
care company is [employer]. sleeps better on cpap and uses it nightly), 
allergic rhinitis and gastroesophageal reflux.    



  The patient has been exposed to animal dander (1 dog), while the patient has 
not been exposed to wood burning stove, mold/mildew in the home or tobacco 
smoke. Past evaluation has included chest x-ray (    



 had CABG 2015 and we now have image reports. she had post op left 
effusion and had TAP. we don't have TAP results but she is unaware of any 
significant finding. she also had cardiac pacemaker place    



 d since her last visit here without complication).    



 Encounter Diagnosis:                                 ASTHMA (Renamed from 
AIRWAY HYPERREACTIVITY), OBSTRUCTIVE SLEEP APNEA, OBESITY (Renamed from OBESE), 
ALLERGIC RHINITIS, GASTROESOPHAGEAL REFLUX DISEASE    

 

 Order Only  14-Dec-2016 15:51  To 14-Dec-2016 15:52  



 Encounter Diagnosis:                                 ASTHMA (Renamed from 
AIRWAY HYPERREACTIVITY)  [facility]  

 

 Historical Summary  14-Dec-2016 11:50  To 14-Dec-2016 11:51  



   [facility]  

 

 Historical Summary  14-Dec-2016 11:30  To 14-Dec-2016 11:31  



 Encounter Reason:                                     ASTHMA, FOLLOW UP - The 
last clinic visit was 6 month(s) ago (primary is Dr. Nicole Murdock, seeing  
[facility]  



 Dr. Nayana Velarde, and Dr. Quinton Borja). Management changes made at 
the last visit include none (except to use CPAP nightly and lose weight, 
address allergy and GERD, and check pleural fluid res    



 ults.). The patient's asthma causes daytime symptoms 1 to 2 times per month. 
The patient's asthma is not disturbing sleep. Symptoms include shortness of 
breath (minimally at this time) and non-productiv    



 e cough, while symptoms do not include wheezing, chest tightness, productive 
cough or chest pain. The patient describes the difficulty breathing as dyspnea 
on exertion. The patient describes this as unc    



 hanged (except with recent infection, improving). Symptoms are exacerbated by 
cold temperature and activity (also allergy symptoms). Symptoms are relieved by 
inhaler use, rest and oral steroids (last wa    



 s spring 2015). Associated symptoms do not include fever, upper respiratory 
infection symptoms or allergy symptoms. Current treatment includes inhaled 
albuterol, inhaled long-acting beta-2 agonists, inh    



 aled corticosteroids (dulera 100/5 2 puffs bid) and leukotriene modifiers. 
Bronchodilator use is staying the same (hasn't needed). By report there is good 
compliance with treatment, good tolerance of tr    



 eatment and fair symptom control. Pertinent medical history includes other 
pulmonary disease (has sleep apnea, using auto titrating cpap now. home care 
company is [employer]), allergic rhinitis    



 and gastroesophageal reflux. The patient has been exposed to animal dander (1 
dog), while the patient has not been exposed to wood burning stove, mold/mildew 
in the home or tobacco smoke. Past evaluatio    



 n has included chest x-ray (had CABG 2015 and we now have image reports. 
she had post op left effusion and had TAP. we don't have TAP results but she is 
unaware of any significant finding. she also    



  had cardiac pacemaker placed since her last visit here without complication).
    

 

 Office Visit  14-Dec-2016 11:26  To 14-Dec-2016 12:10  



 Encounter Reason:                                     ASTHMA, FOLLOW UP - The 
last clinic visit was 6 month(s) ago (primary is Dr. Nicole Murdock, seeing  
[facility]  



 Dr. Nayana Velarde, and Dr. Quinton Borja). Management changes made at 
the last visit include none (except to use CPAP nightly which she has done, 
lose weight but has gained 19# since last visit an    



 d to be getting bariatric consult 2017, is addressing allergy and GERD.). 
The patient's asthma causes daytime symptoms 1 to 2 times per month. The patient
's asthma is not disturbing sleep. Symptoms in    



 clude shortness of breath (a little worse, ? related to weight) and productive 
cough (clear sputum), while symptoms do not include wheezing, chest tightness, 
non-productive cough or chest pain. The chris    



 ent describes the difficulty breathing as dyspnea on exertion. The episodes 
occur daily. The patient describes this as mild and worsening (slightly). 
Symptoms are exacerbated by cold temperature and act    



 ivity (also allergy symptoms sporadically). Symptoms are relieved by inhaler 
use, rest and oral steroids (with acute episodes, last was spring 2015). 
Associated symptoms do not include fever, upper resp    



 iratory infection symptoms or allergy symptoms. Current treatment includes 
inhaled albuterol (prn and has not used, has had flu vaccine), inhaled long-
acting beta-2 agonists, inhaled corticosteroids (du    



 ingrid 100/5 2 puffs bid) and leukotriene modifiers (singulair daily). 
Bronchodilator use is staying the same (hasn't needed). By report there is good 
compliance with treatment, good tolerance of treatmen    



 t and good symptom control. Pertinent medical history includes other pulmonary 
disease (has sleep apnea, using auto titrating cpap now. home care company is 
[employer]), allergic rhinitis and ga    



 stroesophageal reflux. The patient has been exposed to animal dander (1 dog), 
while the patient has not been exposed to wood burning stove, mold/mildew in 
the home or tobacco smoke. The patient is curre    



 ntly able to do activities of daily living without limitations and able to do 
housework without limitations. Past evaluation has included chest x-ray (had 
CABG 2015 and we now have image reports. s    



 he had post op left effusion and had TAP. we don't have TAP results but she is 
unaware of any significant finding. she also had cardiac pacemaker placed since 
her last visit here without complication).    



 Encounter Diagnosis:                                 ALLERGIC RHINITIS, ASTHMA 
(Renamed from AIRWAY HYPERREACTIVITY), OBESITY (Renamed from OBESE), 
OBSTRUCTIVE SLEEP APNEA, GASTROESOPHAGEAL REFLUX DISEASE    

 

 Office Visit  2016 9:46  To 2016 11:08  



 Encounter Reason:                                     ASTHMA, FOLLOW UP - The 
last clinic visit was 7 month(s) ago (primary is Dr. Nicole Murdock, [facility]  



 Dr. Nayana Velarde, and Dr. Quinton Borja). No changes in management were 
made at the last visit. The patient's asthma causes daytime symptoms 1 to 2 
times per month. The patient's asthma is not di    



 sturbing sleep. Symptoms include shortness of breath (minimally at this time) 
and non-productive cough, while symptoms do not include wheezing, chest 
tightness, productive cough or chest pain. The patie    



 nt describes the difficulty breathing as dyspnea on exertion. The patient 
describes this as unchanged (except with recent infection, improving). Symptoms 
are exacerbated by cold temperature and activity    



  (also allergy symptoms). Symptoms are relieved by inhaler use, rest and oral 
steroids (last was spring 2015). Associated symptoms do not include fever, 
upper respiratory infection symptoms or allergy s    



 ymptoms. Current treatment includes inhaled albuterol, inhaled long-acting beta
-2 agonists, inhaled corticosteroids (dulera 100/5 2 puffs bid) and leukotriene 
modifiers. Bronchodilator use is staying th    



  same (hasn't needed). By report there is good compliance with treatment, 
good tolerance of treatment and fair symptom control. Pertinent medical history 
includes other pulmonary disease (has sleep apn    



 ea, using auto titrating cpap now. home care company is [employer]), 
allergic rhinitis and gastroesophageal reflux. The patient has been exposed to 
animal dander (1 dog), while the patient has n    



 ot been exposed to wood burning stove, mold/mildew in the home or tobacco 
smoke. Past evaluation has included chest x-ray (had CABG 2015 and we now 
have image reports. she had post op left effusion    



  and had TAP. we don't have TAP results but she is unaware of any significant 
finding. she also had cardiac pacemaker placed since her last visit here 
without complication).    



 Encounter Diagnosis:                                 ASTHMA (Renamed from 
AIRWAY HYPERREACTIVITY), PLEURAL EFFUSION, LEFT, OBSTRUCTIVE SLEEP APNEA, 
OBESITY (Renamed from OBESE), GASTROESOPHAGEAL REFLUX DISEASE, ALLERGIC 
RHINITIS    

 

 Office Visit  2015 8:27  To 2015 9:09  



 Encounter Reason:                                     ASTHMA, FOLLOW UP - The 
last clinic visit was 7 month(s) ago (primary is Dr. Nicole Murdock, seeing  
[facility]  



 Dr. Nayana Velarde, and Dr. Quinton Borja). No changes in management were 
made at the last visit. The patient's asthma causes daytime symptoms 1 to 2 
times per month. The patient's asthma is not di    



 sturbing sleep. Symptoms include shortness of breath (minimally at this time, 
less active as she is waiting to start cardiac rehab after CABG) and non-
productive cough, while symptoms do not include whe    



 ezing, chest tightness, productive cough or chest pain. The patient describes 
the difficulty breathing as dyspnea on exertion. The patient describes this as 
improving. Symptoms are exacerbated by cold t    



 emperature and activity (also allergy symptoms). Symptoms are relieved by 
inhaler use, rest and oral steroids (last was spring 2015). Associated symptoms 
do not include fever, upper respiratory infectio    



 n symptoms or allergy symptoms. Current treatment includes inhaled albuterol, 
inhaled long-acting beta-2 agonists, inhaled corticosteroids (dulera 200/5 2 
puffs bid) and leukotriene modifiers. Bronchodi    



 lator use is becoming less frequent. By report there is good compliance with 
treatment, good tolerance of treatment and fair symptom control. Pertinent 
medical history includes other pulmonary disease (    



 has sleep apnea, using auto titrating cpap now without issue. able to use all 
night. home care company is [employer]), allergic rhinitis and gastroesophageal 
reflux. The patient has been exposed to animal    



  dander (1 dog), while the patient has not been exposed to wood burning stove, 
mold/mildew in the home or tobacco smoke.    



 Encounter Diagnosis:                                 ASTHMA (Renamed from 
AIRWAY HYPERREACTIVITY), OBSTRUCTIVE SLEEP APNEA, OBESITY (Renamed from OBESE), 
GASTROESOPHAGEAL REFLUX DISEASE, ALLERGIC RHINITIS    

 

 Office Visit  2015 10:36  To 2015 13:38  



 Encounter Reason:                                     ASTHMA, FOLLOW UP - The 
last clinic visit was 5 month(s) ago (primary is Dr. Nicole Murdock, seeing  
[facility]  



 Dr. Velarde). No changes in management were made at the last visit. The 
patient's asthma causes daytime symptoms most days (just recently with acute 
sinusitis. has seen allergist and is on second cours    



 e of abx/prednisone.  has also been feeling ill. she has gradually 
improved. she will be done with this course of abx/pred within the next couple 
days. she was otherwise feeling well all winter.    



 retired in 2014 and she was feeling better last year after getting 
coronary artery stent. she has completed cardiac rehab. she mentions enlarged 
lymph nodes, initially noted in her neck on carotid a    



 rtery testing. aspiration was negative for malignancy per patient. she has 
established with Dr. Inez Ponce and had full ct scan and has numerous 
enlarged nodes that are being monitored). The patient    



 's asthma is not disturbing sleep. Symptoms include wheezing (occ), shortness 
of breath (a little) and productive cough (thick, white), while symptoms do not 
include chest tightness, non-productive coug    



 h or chest pain. The patient describes the difficulty breathing as dyspnea on 
exertion. The patient describes this as unchanged (except with recent infection
, improving). Symptoms are exacerbated by col    



 d temperature and activity (also allergy symptoms). Symptoms are relieved by 
inhaler use, rest and oral steroids. Associated symptoms include allergy 
symptoms, while associated symptoms do not include f    



 ever or upper respiratory infection symptoms. Current treatment includes 
inhaled albuterol, inhaled long-acting beta-2 agonists, inhaled corticosteroids 
(dulera 200/5 2 puffs bid) and leukotriene modifi    



 ers. Bronchodilator use is becoming more frequent (just with recent URI). By 
report there is good compliance with treatment, good tolerance of treatment and 
fair symptom control. Pertinent medical histo    



 ry includes other pulmonary disease (has sleep apnea, using auto titrating 
cpap now without issue. able to use all night. home care company is [employer]), 
allergic rhinitis and gastroesophageal reflux. T    



 he patient has been exposed to animal dander (1 dog), while the patient has 
not been exposed to wood burning stove, mold/mildew in the home or tobacco 
smoke.    



 Encounter Diagnosis:                                 ASTHMA (Renamed from 
AIRWAY HYPERREACTIVITY), OBSTRUCTIVE SLEEP APNEA, OBESITY (Renamed from OBESE), 
GASTROESOPHAGEAL REFLUX DISEASE, ALLERGIC RHINITIS    

 

 Office Visit  6-Oct-2014 14:18  To 6-Oct-2014 15:04  



 Encounter Reason:                                     ASTHMA, FOLLOW UP - The 
last clinic visit was 3 month(s) ago (primary is Dr. Nicole Murdock, seeing  
[facility]  



 Dr. Velarde). Management changes made at the last visit include stopping 
alvesco. The patient's asthma causes daytime symptoms 1 to 2 times per week. 
The patient's asthma is not disturbing sleep. Sympt    



 oms include chest tightness (occ with exercise) and shortness of breath (a 
little, much better compared to prior visits), while symptoms do not include 
wheezing, productive cough, non-productive cough o    



 r chest pain. The patient describes the difficulty breathing as dyspnea on 
exertion. The patient describes this as improving (she did have cardiac cath in 
august with 2 stents. no MI. she is in cardiac    



 rehab. she does have aortic valve disease but not significant enough for 
intervention at this time). Symptoms are exacerbated by activity (also allergy 
symptoms). Symptoms are relieved by inhaler use, r    



 est and oral steroids. Associated symptoms include allergy symptoms, while 
associated symptoms do not include fever or upper respiratory infection 
symptoms. Current treatment includes inhaled albuterol,    



  inhaled long-acting beta-2 agonists, inhaled corticosteroids (dulera 200/5 2 
puffs bid) and leukotriene modifiers. Bronchodilator use is becoming less 
frequent. By report there is good compliance with    



 treatment, good tolerance of treatment and fair symptom control. Pertinent 
medical history includes other pulmonary disease (has sleep apnea, using auto 
titrating cpap now with better response. pressure    



 s are more comfortable), allergic rhinitis and gastroesophageal reflux.    



 Encounter Diagnosis:                                 ASTHMA (Renamed from 
AIRWAY HYPERREACTIVITY), ALLERGIC RHINITIS, GASTROESOPHAGEAL REFLUX DISEASE, 
OBSTRUCTIVE SLEEP APNEA, OBESITY (Renamed from OBESE)    

 

 Office Visit  2014 11:11  To 2014 11:51  



 Encounter Reason:                                     ASTHMA, FOLLOW UP - The 
last clinic visit was 1 month(s) ago (primary is Dr. Nicole Murdock, seeing  
[facility]  



 Dr. Velarde). Management changes made at the last visit include adding 
alvesco 2 puffs bid (has been helpful but more helpful was a diuretic change 
with improvement in edema. recent echo showed more ao    



 rtic valve stenosis and she is going to need to see cardiology-Ari. 
waiting on appt date/time). The patient's asthma causes daytime symptoms most 
days (but better). The patient's asthma is not di    



 sturbing sleep. Symptoms include shortness of breath (improved from may 2014 
visit, possible cardiology cause as well. really improved with diuretic change) 
and productive cough (clear mucous), while sy    



 mptoms do not include wheezing, chest tightness, non-productive cough or chest 
pain. The patient describes the difficulty breathing as dyspnea on exertion. 
The patient describes this as improving. Sympt    



 oms are exacerbated by activity (also allergy symptoms). Symptoms are relieved 
by inhaler use, rest and oral steroids. Associated symptoms include allergy 
symptoms, while associated symptoms do not incl    



 ude fever or upper respiratory infection symptoms. Current treatment includes 
inhaled albuterol, inhaled long-acting beta-2 agonists, inhaled corticosteroids 
(dulera 200/5 2 puffs bid) and leukotriene m    



 odifiers. Bronchodilator use is becoming less frequent. By report there is 
good compliance with treatment, good tolerance of treatment and fair symptom 
control. Pertinent medical history includes other    



 pulmonary disease (has sleep apnea, using auto titrating cpap now with better 
response. pressures are more comfortable), allergic rhinitis and 
gastroesophageal reflux.    



 Encounter Diagnosis:                                 ASTHMA (Renamed from 
AIRWAY HYPERREACTIVITY), OBSTRUCTIVE SLEEP APNEA, OBESITY (Renamed from OBESE), 
ALLERGIC RHINITIS, GASTROESOPHAGEAL REFLUX DISEASE    

 

 Annotation/Addendum  30-May-2014 12:52  To 30-May-2014 13:09  



 Encounter Diagnosis:                                 ASTHMA (Renamed from 
AIRWAY HYPERREACTIVITY)  [facility]  

 

 Office Visit  30-May-2014 11:58  To 30-May-2014 13:17  



 Encounter Reason:                                     ASTHMA, FOLLOW UP - The 
last clinic visit was 7 month(s) ago (primary is Dr. Nicole Murdock, seeing  
[facility]  



 Dr. Velarde). No changes in management were made at the last visit. The 
patient's asthma causes daytime symptoms most days. The patient's asthma is not 
disturbing sleep. Symptoms include chest tightnes    



 s, shortness of breath (increased) and productive cough (clear mucous), while 
symptoms do not include wheezing, non-productive cough or chest pain. The 
patient describes the difficulty breathing as ches    



 t tightness and dyspnea on exertion. The patient describes this as worsening (
started to have increased symptoms in march, dyspnea/wheeze/cough with dark 
mucous. was seen by allergist and given zpac and    



  5 days of oral steroids. she noticed 55% improvement but now symptoms have 
just lingered, especially chest tightness). Symptoms are exacerbated by 
activity (also allergy symptoms). Symptoms are relieve    



 d by inhaler use, rest and oral steroids. Associated symptoms include allergy 
symptoms, while associated symptoms do not include fever or upper respiratory 
infection symptoms. Current treatment includes    



  inhaled albuterol, inhaled long-acting beta-2 agonists, inhaled 
corticosteroids (dulera 200/5 2 puffs bid) and leukotriene modifiers. 
Bronchodilator use is becoming more frequent. By report there is go    



 od compliance with treatment, good tolerance of treatment and fair symptom 
control. Pertinent medical history includes other pulmonary disease (has sleep 
apnea, using auto titrating cpap now with better    



  response. pressures are more comfortable), allergic rhinitis and 
gastroesophageal reflux.    



 Encounter Diagnosis:                                 ASTHMA (Renamed from 
AIRWAY HYPERREACTIVITY), GASTROESOPHAGEAL REFLUX DISEASE, ALLERGIC RHINITIS, 
OBSTRUCTIVE SLEEP APNEA, OBESITY (Renamed from OBESE)    

 

 Office Visit  1-Oct-2013 9:32  To 1-Oct-2013 10:49  



 Encounter Reason:                                     ASTHMA, FOLLOW UP - The 
last clinic visit was 6 month(s) ago (primary is Dr. Nicole Murdock, [facility]  



 Dr. Velarde). No changes in management were made at the last visit. The 
patient's asthma causes daytime symptoms 1 to 2 times per month (even less). 
The patient's asthma is not disturbing sleep. Sympto    



 ms include shortness of breath (occ with exertion) and non-productive cough, 
while symptoms do not include wheezing, chest tightness, productive cough or 
chest pain. The patient describes the difficulty    



  breathing as dyspnea on exertion. The patient describes this as unchanged. 
Symptoms are exacerbated by activity (also allergy symptoms). Symptoms are 
relieved by inhaler use and rest. Associated sympto    



 ms include allergy symptoms, while associated symptoms do not include fever or 
upper respiratory infection symptoms. Current treatment includes inhaled 
albuterol, inhaled long-acting beta-2 agonists, in    



 haled corticosteroids (dulera 200/5 2 puffs bid) and leukotriene modifiers. 
Bronchodilator use is staying the same. By report there is good compliance with 
treatment, good tolerance of treatment and goo    



 d symptom control. Pertinent medical history includes other pulmonary disease (
has sleep apnea, using auto titrating cpap now with better response. pressures 
are more comfortable), allergic rhinitis and gastroesophageal reflux.    



 Encounter Diagnosis:                                 ASTHMA (Renamed from 
AIRWAY HYPERREACTIVITY), OBSTRUCTIVE SLEEP APNEA, ALLERGIC RHINITIS, OBESITY (
Renamed from OBESE)    

 

 Medication Order  27-Mar-2013 16:24  To 28-Mar-2013 9:08  



 Encounter Diagnosis:                                 ASTHMA (Renamed from 
AIRWAY HYPERREACTIVITY)  [facility]  

 

 Office Visit  27-Mar-2013 14:23  To 27-Mar-2013 15:32  



 Encounter Reason:                                     ASTHMA, FOLLOW UP - The 
last clinic visit was 6 month(s) ago (primary is Dr. Nicole Murdokc, seeing  
[facility]  



 Dr. Velarde). No changes in management were made at the last visit. The 
patient's asthma causes daytime symptoms 1 to 2 times per month (even less). 
The patient's asthma is not disturbing sleep. Sympto    



 ms include shortness of breath (occ with exertion) and non-productive cough, 
while symptoms do not include wheezing, chest tightness, productive cough or 
chest pain. The patient describes the difficulty    



  breathing as dyspnea on exertion. The patient describes this as improving. 
Symptoms are exacerbated by activity (also allergy symptoms). Symptoms are 
relieved by inhaler use and rest. Associated sympto    



 ms do not include fever, upper respiratory infection symptoms or allergy 
symptoms. Current treatment includes inhaled albuterol, inhaled long-acting beta
-2 agonists, inhaled corticosteroids (dulera 200/    



 5 2 puffs bid) and leukotriene modifiers. Bronchodilator use is staying the 
same. By report there is good compliance with treatment, good tolerance of 
treatment and good symptom control. Pertinent medic    



 al history includes other pulmonary disease (has sleep apnea, using cpap every 
night. home care company is [facility] [employer] in [place]. a letter was reviewed 
from the home care company that a recent down    



 load showed an AHI of just over 6. patient did have a mask change and is using 
cpap regularly. still with occ problems with leak/comfort), allergic rhinitis 
and gastroesophageal reflux.    



 Encounter Diagnosis:                                 ASTHMA (493.90), GERD (
GASTROESOPHAGEAL REFLUX DISEASE) (530.81), ALLERGIC RHINITIS, SYVLIA (OBSTRUCTIVE 
SLEEP APNEA) (327.23), OBESITY (Renamed from OBESE)    

 

 Office Visit  27-Sep-2012 12:19  To 27-Sep-2012 13:47  



 Encounter Reason:                                     ASTHMA, FOLLOW UP - The 
last clinic visit was 6 month(s) ago (primary is Dr. Nicole Murdock, seeing  
[facility]  



 Dr. Velarde). No changes in management were made at the last visit. The 
patient's asthma causes daytime symptoms 1 to 2 times per week. The patient's 
asthma is not disturbing sleep. Symptoms include wh    



 eezing (occ), shortness of breath (occ with exertion, still better with the 
dulera) and productive cough (occ), while symptoms do not include chest 
tightness, non-productive cough or chest pain. The pat    



 ient describes the difficulty breathing as dyspnea on exertion. The patient 
describes this as unchanged. Symptoms are exacerbated by activity (also allergy 
symptoms). Symptoms are relieved by inhaler us    



 e and rest. Associated symptoms include allergy symptoms, while associated 
symptoms do not include fever or upper respiratory infection symptoms. Current 
treatment includes inhaled albuterol, inhaled lo    



 ng-acting beta-2 agonists, inhaled corticosteroids (dulera 200/5 2 puffs bid) 
and leukotriene modifiers. Bronchodilator use is staying the same. By report 
there is good compliance with treatment, good t    



 olerance of treatment and good symptom control. Pertinent medical history 
includes other pulmonary disease (has sleep apnea, using cpap every night), 
allergic rhinitis and gastroesophageal reflux.    



 Encounter Diagnosis:                                 ASTHMA (493.90), SYLVIA (
OBSTRUCTIVE SLEEP APNEA) (327.23), OBESITY (278.00), ALLERGIC RHINITIS (477.9), 
GERD (GASTROESOPHAGEAL REFLUX DISEASE) (530.81)    

 

 Medication Order  10-Sep-2012 16:43  To 10-Sep-2012 16:56  



 Encounter Diagnosis:                                 SYLVIA (OBSTRUCTIVE SLEEP 
APNEA) (327.23)  [facility]  

 

 Historical Summary  2012 13:14  To 2012 15:31  



 Encounter Diagnosis:                                 ALLERGIC RHINITIS (477.9)
  [facility]  

 

 Office Visit  27-Mar-2012 12:27  To 27-Mar-2012 13:13  



 Encounter Reason:                                     Asthma Follow-up - The 
last office visit was 6 month(s) ago. No changes in management were made at [facility]  



 e last visit. Symptoms do not include wheezing, chest tightness, shortness of 
breath, productive cough or non-productive cough. The patient describes this as 
improving (with weight loss). Symptoms are e    



 xacerbated by activity. Symptoms are relieved by rest. Associated symptoms do 
not include fever or upper respiratory infection symptoms. Current treatment 
includes inhaled albuterol, inhaled long-acting    



  beta-2 agonists, inhaled corticosteroids (dulera 200/5 mcg 2 puffs bid) and 
leukotriene modifiers. Bronchodilator use is becoming less frequent (hasn't 
needed since last visit). Denies recent hospitalization,    



  [ADDITIONAL REASON] Sleep follow up - The sleep disorder is characterized as 
obstructive sleep apnea. The last office visit was 6 month(s) ago. No changes 
in management were made at the last visit. The    



  patient gets approximately 8 hours of sleep per night. Current treatment 
includes CPAP therapy (with 20 lbs weight loss from last visit cpap pressure is 
too much, blowing mask off face. hasn't been abl    



 e to wear in 2 months). By report there is good compliance with treatment (
except with recent pressure issue). Current symptoms include unrefreshing 
sleep. The patient describes this as worsening (d/t inability to wear).    



 Encounter Diagnosis:                                 ASTHMA (493.90), SYLVIA (
OBSTRUCTIVE SLEEP APNEA) (327.23), GERD (GASTROESOPHAGEAL REFLUX DISEASE) (
530.81), ALLERGIC RHINITIS (477.9), OBESITY (278.00)    

 

 Historical Summary  27-Sep-2011 15:18  To 27-Sep-2011 15:20  



   [facility]  Office  

 

 Office Visit  27-Sep-2011 13:05  To 27-Sep-2011 14:21  



 Encounter Reason:                                     Asthma Follow Up PHP - 
Symptoms include wheezing (much less), cough and mucous, while symptoms do not  
[facility]  



  include dyspnea, rescue inhaler, chest tightness or infections. The last 
clinic visit was 2 month(s) ago. Management changes made at the last visit 
include adding astepro and clarinex and stopping naso    



 nex. Current treatment includes inhaled short-acting beta-2 agonists, inhaled 
long-acting beta-2 agonists and inhaled corticosteroids. Pertinent medical 
history includes allergic rhinitis and gastroesop    



 hageal reflux. The patient describes this as improving (improved from changes 
made at last office visit, allergies are better). By report there is good 
compliance with treatment. The patient is currentl    



 y able to do activities of daily living without limitations, able to work 
without limitations and able to do housework without limitations.  Hasn't been 
hospitalized.  Hasn't needed rescue inhaler.,    



  [ADDITIONAL REASON] Sleep Follow Up PHP - The sleep disorder is characterized 
as obstructive sleep apnea. The last clinic visit was 2 month(s) ago. No 
changes in management were made at the last visit.    



  The patient gets approximately 6 hours of sleep per night. Current treatment 
includes CPAP therapy. By report there is good compliance with treatment. 
Current symptoms do not include excessive daytime    



 sleepiness, witnessed gasping during sleep, nocturnal choking, snoring or 
weight gain.  Not falling sleep driving.  Vendor is [employer] and she is going to be 
getting new supplies this week.  No issues with C    



 PAP tolerance, but some discomfort at bridge of nose.  May change mask.  Has 
humidity on machine.    



 Encounter Diagnosis:                                 SYLVIA (OBSTRUCTIVE SLEEP 
APNEA) (327.23), ASTHMA (493.90), OBESITY (278.00), ALLERGIC RHINITIS (477.9), 
GERD (GASTROESOPHAGEAL REFLUX DISEASE) (530.81)    

 

 Historical Summary  27-Sep-2011 10:04  To 27-Sep-2011 11:17  



   [facility]  

 

 Historical Summary  13-Sep-2011 17:39  To 13-Sep-2011 17:43  



   [facility]  







Payers







 [employer]    



 [address] [facility] [address] [place]    Group Number: [id number]



 tel:[phone]    

 

 [employer] [facility]    



 [address] [address] [place] [address] [place]    Group Number: [id number]



 tel:[phone]    









 [employer]



 [address] [address] 



 tel:[phone]

## 2020-02-27 NOTE — UC
Laceration HPI





- HPI Summary


HPI Summary: 


70yo female presenting with skin tear on dorsal left wrist. Patient states this 

happened yesterday afternoon when she "reached into the washer and caught her 

arm on the rubber gasket." States she rinsed it out immediately. States it 

stopped bleeding "for the most part but keeps oozing." Notes tender to touch. 

Also notes bruising because she "bruises very easily." No blood thinners but 

takes aspirin daily. Adds that she "has a cough because she is getting over 

bronchitis."








- History Of Current Complaint


Chief Complaint: UCLaceration


Stated Complaint: LEFT FOREARM LACERATION


Hx Obtained From: Patient


Pain Intensity: 4


Pain Scale Used: 0-10 Numeric





- Allergies/Home Medications


Allergies/Adverse Reactions: 


 Allergies











Allergy/AdvReac Type Severity Reaction Status Date / Time


 


Penicillins Allergy  Hives Verified 02/27/20 11:56


 


piroxicam [From Feldene] Allergy  Blisters Verified 02/27/20 11:56


 


environmental Allergy  Difficulty Uncoded 02/27/20 11:56





   Breathing  











Home Medications: 


 Home Medications





Aspirin [Aspirin Adult Low Dose] 81 mg PO QPM 07/01/15 [History Confirmed 02/28/ 20]


Azelastine 0.15% NASAL(NF) [Astepro 0.15% NASAL (NF)] 1 spray NASAL BID 07/01/

15 [History Confirmed 02/28/20]


Irbesartan (NF) [Avapro (NF)] 75 mg PO QPM 07/01/15 [History Confirmed 02/28/20]


LevoCETirizine TAB (NF) [Xyzal TAB (NF)] 5 mg PO QPM 07/01/15 [History 

Confirmed 02/28/20]


Levothyroxine TAB* [Synthroid TAB*] 125 mcg PO DAILY 07/01/15 [History 

Confirmed 02/28/20]


Montelukast Sodium TAB* [Singulair TAB*] 10 mg PO QPM 07/01/15 [History 

Confirmed 02/28/20]


Albuterol HFA INHALER* [Ventolin HFA Inhaler*] 2 puff INH Q4H PRN 04/20/18 [

History Confirmed 02/28/20]


Allopurinol TAB* [Zyloprim 100 MG TAB*] 300 mg PO DAILY 04/20/18 [History 

Confirmed 02/28/20]


Atorvastatin* [Lipitor*] 20 mg PO QPM 04/20/18 [History Confirmed 02/28/20]


Furosemide TAB* [Lasix TAB*] 80 mg PO BID 04/20/18 [History Confirmed 02/28/20]


Nystatin CREAM* 1 applic TOPICAL TID PRN 04/20/18 [History Confirmed 02/28/20]


Pantoprazole TAB * [Protonix TAB (NF)] 40 mg PO QPM 04/20/18 [History Confirmed 

02/28/20]


Potassium Chlor TAB* [Klor Con ER TAB*] 10 meq PO DAILY 04/20/18 [History 

Confirmed 02/28/20]


Spironolactone TAB* [Aldactone TAB*] 12.5 mg PO DAILY 04/20/18 [History 

Confirmed 02/28/20]


Tiotropium CAPSULE (NF) [Spiriva CAP.INH*] 2 cap.inh INH DAILY 04/20/18 [

History Confirmed 02/28/20]


Acetaminophen [Pain Relief] 500 mg PO DAILY 02/27/20 [History Confirmed 02/28/20

]


Fluticas/Salmet 230/21 HFA(NF) [Advair HFA 23O/21 (NF)] 2 puff INH BID 02/27/20 

[History Confirmed 02/28/20]


Ipratropium/Albuterol Sulfate [Iprat-Albut 0.5-3(2.5) mg/3 ml] 3 ml INH BID 02/ 27/20 [History Confirmed 02/28/20]


Iron Transfusion 1 dose IV SEE INSTRUCTIONS 02/27/20 [History Confirmed 02/28/20

]


Magnesium Oxide [Magnesium] 400 mg PO DAILY 02/27/20 [History Confirmed 02/28/20

]











PMH/Surg Hx/FS Hx/Imm Hx


Respiratory History: Bronchitis





- Surgical History


Surgical History: Yes


Surgery Procedure, Year, and Place: stents.  T&A.  marta.  appy.  L carotid 

[place] of [place] coil.  Kidney stone blasted.  9/2015 cardiac bypass.  mitral 

valve repair





- Family History


Known Family History: Positive: Cardiac Disease, Hypertension





- Social History


Alcohol Use: Occasionally


Substance Use Type: None


Smoking Status (MU): Former Smoker


Type: Cigarettes


When Did the Patient Quit Smoking/Using Tobacco: 1970





- Immunization History


Most Recent Influenza Vaccination: 2014


Most Recent Tetanus Shot: 2012


Most Recent Pneumonia Vaccination: current





Review of Systems


All Other Systems Reviewed And Are Negative: No


Constitutional: Positive: Negative


Skin: Positive: Other - "oozing skin tear with bruising" left dorsal wrist


Respiratory: Positive: Cough - "getting over bronchitis"


Cardiovascular: Positive: Negative


Gastrointestinal: Positive: Negative


Musculoskeletal: Positive: Negative


Neurological/Mental Status: Positive: Negative





Physical Exam





- Summary


Physical Exam Summary: 


Vital Signs Reviewed: Yes


A+Ox3, no distress


Eyes: Conjunctiva Clear


ENT: Hearing grossly normal


neck: supple


Respiratory: Positive: No respiratory distress, No accessory muscle use


Cardiovascular: skin color reflect adequate perfusion


Musculoskeletal Exam: JAY x 4 without difficulty


Neurological: Positive: Alert,  ambulatory without difficulty


Psychological: Positive: Normal Response To Family


Skin: Positive: ~3.5 cm long horizontal superficial skin tear noted on dorsal 

left wrist, minimal bleeding, skin flap not well approximated, significant 

surrounding ecchymosis, mild TTP








Vital Signs: 


 Initial Vital Signs











Temp  97.3 F   02/27/20 11:38


 


Pulse  87   02/27/20 11:38


 


Resp  16   02/27/20 11:38


 


BP  110/64   02/27/20 11:38


 


Pulse Ox  99   02/27/20 11:38














Procedures





- Procedure Summary


Procedure Summary: 


A time out was performed with Yasemin Pete RN and JAXON Trevizo. I proceeded to remove 

the devitalized tissue from the skin tear on the patient's left wrist with 

sterile forceps and scissors. Patient tolerated procedure well.








Laceration Course/Dx





- Course/Dx


Course Of Treatment: 


Patient's skin tear was cleansed. A time out was performed with Yasemin Pete RN and 

JAXON Trevizo. I proceeded to remove the devitalized tissue from the skin tear on 

the patient's left wrist with sterile forceps and scissors. Patient tolerated 

procedure well. A dressing was then applied. I educated patient on wound care, 

protecting the wound, and instructed to monitor for s/s of infection. 

Instructed to keep bandage on for 7 days or longer if possible. Patient voiced 

understanding and agreed with treatment plan. Patient states UTD on tetanus











- Diagnosis


Provider Diagnosis: 


 Tear of skin of left wrist








Discharge ED





- Sign-Out/Discharge


Documenting (check all that apply): Patient Departure


All imaging exams completed and their final reports reviewed: No Studies





- Discharge Plan


Condition: Stable


Disposition: HOME


Patient Education Materials:  Skin Tear (ED)


Referrals: 


Nicole Murdock MD [Primary Care Provider] - If Needed


Additional Instructions: 


Keep the bandage on for the next 7 days or longer if you can.


Keep the area protected.


Go to the emergency room if you experience increasing pain, drainage, or fever.





- Billing Disposition and Condition


Condition: STABLE


Disposition: Home





- Attestation Statements


Provider Attestation: 








This patient was not seen by me.


I was available for consult.


Chart reviewed.


TONY

## 2020-02-27 NOTE — XMS REPORT
Continuity of Care Document (CCD)

 Created on:2020



Patient:Teri Heaton

Sex:Female

:1950

External Reference #:[id number]





Demographics







 Address  [address] [place] [address] 



   [place] [address]

 

 Home Phone  [phone]

 

 Mobile Phone  [phone]

 

 Phone  [phone]

 

 Email Address  [email or web address]

 

 Preferred Language  en

 

 Marital Status  Not  or 

 

 Adventist Affiliation  Unknown

 

 Race  White

 

 Ethnic Group  Not  or 









Author







 Name  FRANKLIN Mariscal

 

 Address  [address] [place] [address]









Care Team Providers







 Name  Role  Phone

 

 Nicole Murdock MD  Care Team Information   [phone]









Problems







 Active Problems  Provider  Date

 

 Allergic asthma without status asthmaticus  LAW White  Onset: 

 

 Allergic rhinitis  LAW White  Onset: 2014

 

 Allergic rhinitis due to pollen  LAW White  Onset: 2014

 

 Acute maxillary sinusitis  LAW White  Onset: 2014

 

 Uncomplicated moderate persistent asthma  August Castillo M.D.  Onset: 2017

 

 Cough  August Castillo M.D.  Onset: 10/11/2018







Social History







 Type  Date  Description  Comments

 

 Birth Sex    Unknown  

 

 ETOH Use    Currently consumes  1 glass of wine per



     alcohol  day

 

 Tobacco Use  Start: Unknown End:  Patient is a former  quit 26 years ago;



   Unknown  smoker  h/o tobacco x approx



       25 years (3ppd at



       time of quitting).

 

 Recreational Drug Use    Denies Drug Use  

 

 Smoking Status  Reviewed: 18  Patient is a former  quit 26 years ago;



     smoker  h/o tobacco x approx



       25 years (3ppd at



       time of quitting).







Allergies, Adverse Reactions, Alerts







 Active Allergies  Reaction  Severity  Comments  Date

 

 Feldene      blisters  2008

 

 Penicillin  Urticaria      2008







Medications







 Active Medications  SIG  Qnty  Indications  Ordering  Date



         Provider  

 

 Prednisone  40mg for 3 days,  31tabs  J45.51  Vanita  2020



          10mg Tablets  30mg for 3 days,      Uldrich, FNP-C  



   20 mg for 3 days,        



   10mg for 3 days        



   and 5mg for 2 days        

 

 Aerochamber Plus  for use with mdi  2units    Abigail Horowitz,  2020



 Josef-Vu        FNP-C  



       Misc          



           

 

 Advair HFA  inhale two puffs  12gm    Vanita  2020



   by mouth twice a      LYDIA AlcazarP-C  



 230-21mcg/Act Aerosol  day        



           

 

 Ipratropium Bromide  use with nebulizer  75ml    Vanita  2020



   am&pm      MARIAN Alcazar-C  



 0.02% Solution          



           

 

 Xolair  inject 225mg every  6units    Vanita  2020



      75mg/0.5ML Soln  2 weeks      MARIAN Alcazar-C  



 Prefill Syringe          



           

 

 Azelastine HCL  Spray One Spray  30units    Abigail Horowitz,  2019



 (Nasal)  Into Each Nostril      FNP-C  



       137mcg/Spray  Once In The        



 Solution  Morning And Once        



   AT Night        

 

 Levalbuterol HCL  use via nebulizer  30ml    Vanita  2019



   every 4 -6 hours      MARIAN Alcazar-C  



 1.25mg/3ML Nebulizer  for shortness of        



   breath, cough or        



   wheezing        

 

 Epipen 2-Dl  use as directed  2units    Vanita  2019



         MARIAN Alcazar-C  



 0.3mg/0.3ML Solution          



 Auto-Inject          



           

 

 Cetirizine HCL  1 by mouth every  30tabs    Vaishali Andino,  2018



              10mg  day      LYDIAP-C  



 Tablets          



           

 

 Ventolin HFA  2 inhalations q4h  1units  J45.40  August Castillo,  2015



   prn coughing or      M.D.  



 108(90Base) mcg/Act  wheezing        



 Aerosol          



           

 

 Fluticasone  two sprays in each  1units    August Castillo,  2013



 Propionate  nostril once daily      M.D.  



          50mcg/Act          



 Suspension          



           

 

 Allopurinol  once a day      Unknown  



           300mg          



 Tablets          



           

 

 Spiriva Respimat  2 inhalations once  1units  J45.40  Vanita  



   daily      LYDIA AlcazarP-C  



 2.5mcg/Act Aerosol          



           

 

 Furosemide  1 tabs every day      Unknown  



          80mg Tablets          



           

 

 Spironolactone  1/2 tablet daily      Unknown  



              25mg          



 Tablets          



           

 

 Pantoprazole Sodium  1 tab daily.      Unknown  



           



 40mg Tablets DR Hester M10  1 tab every day      Unknown  



            10Meq          



 Tablets ER          



           

 

 Irbesartan  1 tab every day      Unknown  



          75mg Tablets          



           

 

 Aspirin Low Dose  1 every day      Unknown  



                81mg          



 Chewtabs          



           

 

 Atorvastatin Calcium  1 every day      Unknown  



           



 Powder          



           

 

 Singulair  take 1 tablet by  30tabs    Vanita  



         10mg Tablets  mouth every day      MARIAN Alcazar-C  



   needs annual        

 

 Levothyroxine Sodium  1 tab daily.      Unknown  



           



 125mcg Tablets          



           







Medications Administered in Office







 Medication  SIG  Qnty  Indications  Ordering Provider  Date

 

 Biologic Agent        MARIAN Marino-C  2020



 Administration          



        Injection          



           

 

 Biologic Agent        Vanita Alcazar FNP-C  2020



 Administration          



        Injection          



           

 

 Therapeutic, Prophylactic Or        Nayana Velarde M.D.  2020



 Diagnostic Injection Subq/Im          



           



 Injection          

 

 Biologic Agent        Vanita Inge FNP-C  2020



 Administration          



        Injection          



           

 

 Therapeutic, Prophylactic Or        Nayana Velarde M.D.  2020



 Diagnostic Injection Subq/Im          



           



 Injection          

 

 Biologic Agent        Vanita Inge, FNP-C  2019



 Administration          



        Injection          



           

 

 Therapeutic, Prophylactic Or        Vanita Uldrich, FNP-C  2019



 Diagnostic Injection Subq/Im          



           



 Injection          

 

 Biologic Agent        Vanita Ulbasia, FNP-C  2019



 Administration          



        Injection          



           

 

 Therapeutic, Prophylactic Or        Vanita Uldrich, FNP-C  2019



 Diagnostic Injection Subq/Im          



           



 Injection          

 

 Biologic Agent        Vanita Uldrich, FNP-C  2019



 Administration          



        Injection          



           

 

 Therapeutic, Prophylactic Or        Vanita Uldrich, FNP-C  2019



 Diagnostic Injection Subq/Im          



           



 Injection          

 

 Biologic Agent        Vanita Uldrich, FNP-C  2019



 Administration          



        Injection          



           

 

 Therapeutic, Prophylactic Or        Vanita Uldrich, FNP-C  2019



 Diagnostic Injection Subq/Im          



           



 Injection          

 

 Biologic Agent        Vanita Uldrich, FNP-C  10/23/2019



 Administration          



        Injection          



           

 

 Therapeutic, Prophylactic Or        Vanita Uldrich, FNP-C  10/23/2019



 Diagnostic Injection Subq/Im          



           



 Injection          

 

 Biologic Agent        Vanita Uldrich, FNP-C  10/09/2019



 Administration          



        Injection          



           

 

 Therapeutic, Prophylactic Or        Vanita Uldrich, FNP-C  10/09/2019



 Diagnostic Injection Subq/Im          



           



 Injection          

 

 Therapeutic, Prophylactic Or        Vanita Uldrich, FNP-C  2019



 Diagnostic Injection Subq/Im          



           



 Injection          

 

 Biologic Agent        Vanita Uldrich, FNP-C  2019



 Administration          



        Injection          



           

 

 Therapeutic, Prophylactic Or        Vanita Uldrich, FNP-C  2019



 Diagnostic Injection Subq/Im          



           



 Injection          

 

 Biologic Agent        Vanita Uldrich, FNP-C  2019



 Administration          



        Injection          



           

 

 Therapeutic, Prophylactic Or        Vanita Uldrich, FNP-C  2019



 Diagnostic Injection Subq/Im          



           



 Injection          

 

 Biologic Agent        Vanita Uldrich, FNP-C  2019



 Administration          



        Injection          



           

 

 Therapeutic, Prophylactic Or        Vanita Uldrich, FNP-C  2019



 Diagnostic Injection Subq/Im          



           



 Injection          

 

 Biologic Agent        Vanita Uldrich, FNP-C  2019



 Administration          



        Injection          



           

 

 Therapeutic, Prophylactic Or        Vanita Uldrich, FNP-C  2019



 Diagnostic Injection Subq/Im          



           



 Injection          

 

 Biologic Agent        Vanita Uldrich, FNP-C  2019



 Administration          



        Injection          



           

 

 Therapeutic, Prophylactic Or        Vanita Uldrich, FNP-C  2019



 Diagnostic Injection Subq/Im          



           



 Injection          

 

 Therapeutic, Prophylactic Or        Vanita Uldrich, FNP-C  2019



 Diagnostic Injection Subq/Im          



           



 Injection          

 

 Biologic Agent        Vanita Uldrich, FNP-C  2019



 Administration          



        Injection          



           

 

 Therapeutic, Prophylactic Or        Nayana Velarde M.D.  2019



 Diagnostic Injection Subq/Im          



           



 Injection          

 

 Biologic Agent        Vanita Uldrich, FNP-C  2019



 Administration          



        Injection          



           

 

 Therapeutic, Prophylactic Or        Vanita Uldrich, FNP-C  2019



 Diagnostic Injection Subq/Im          



           



 Injection          

 

 Biologic Agent        Vanita Uldrich, FNP-C  2019



 Administration          



        Injection          



           

 

 Therapeutic, Prophylactic Or        Vanita Uldrich, FNP-C  2019



 Diagnostic Injection Subq/Im          



           



 Injection          

 

 Biologic Agent        Vanita Uldrich, FNP-C  2019



 Administration          



        Injection          



           

 

 Therapeutic, Prophylactic Or        Vanita Uldrich, FNP-C  2019



 Diagnostic Injection Subq/Im          



           



 Injection          

 

 Biologic Agent        Vanita Uldrich, FNP-C  04/10/2019



 Administration          



        Injection          



           

 

 Therapeutic, Prophylactic Or        Vanita Uldrich, FNP-C  04/10/2019



 Diagnostic Injection Subq/Im          



           



 Injection          

 

 Biologic Agent        Vanita Uldrich, FNP-C  2019



 Administration          



        Injection          



           

 

 Therapeutic, Prophylactic Or        Vanita Uldrich, FNP-C  2019



 Diagnostic Injection Subq/Im          



           



 Injection          

 

 Biologic Agent        Kavita Raul, FNP-C  2019



 Administration          



        Injection          



           

 

 Therapeutic, Prophylactic Or        Kavita Raul, FNP-C  2019



 Diagnostic Injection Subq/Im          



           



 Injection          

 

 Biologic Agent        Vanita Uldrich, FNP-C  2019



 Administration          



        Injection          



           

 

 Therapeutic, Prophylactic Or        Vanita Uldrich, FNP-C  2019



 Diagnostic Injection Subq/Im          



           



 Injection          

 

 Biologic Agent        Vanita Uldrich, FNP-C  2019



 Administration          



        Injection          



           

 

 Therapeutic, Prophylactic Or        August Castillo M.D.  2019



 Diagnostic Injection Subq/Im          



           



 Injection          

 

 Biologic Agent        Vanita Uldrich, FNP-C  2019



 Administration          



        Injection          



           

 

 Therapeutic, Prophylactic Or        Nayana Velarde M.D.  2019



 Diagnostic Injection Subq/Im          



           



 Injection          

 

 Biologic Agent        Vanita Uldrich, FNP-C  2019



 Administration          



        Injection          



           

 

 Therapeutic, Prophylactic Or        Vanita Uldrich, FNP-C  2019



 Diagnostic Injection Subq/Im          



           



 Injection          

 

 Celestone/Cortisone        Tere Alvarado,  2015



 [id number] 1 cc        PH.D, RPA-C  



          Injection          



           

 

 Celestone/Cortisone        Tere Alvarado,  2015



 [id number] 1 cc        PH.D, RPA-C  



          Injection          



           

 

 Injection        Elliot Rubinstein, M.D.  2013



   Injection          

 

 Injection        Allergy Injection  2013



   Injection          

 

 Injection        Allergy Injection  2013



   Injection          

 

 Injection        Allergy Injection  2013



   Injection          

 

 Injection        Elliot Rubinstein, M.D.  2013



   Injection          

 

 Injection        Allergy Injection  2013



   Injection          

 

 Injection        Robbi Rubinstein, GISELADLiz  2013



   Injection          

 

 Injection        Allergy Injection  2013



   Injection          

 

 Injection        Robbi Rubinstein, M.DLiz  2013



   Injection          

 

 Injection        Elliot Rubinstein, GISELADLiz  2013



   Injection          

 

 Injection        Elliot Rubinstein, M.DLiz  2013



   Injection          

 

 Injection        Elliot Rubinstein, M.D.  2013



   Injection          

 

 Injection        Nayana Velarde M.D.  2012



   Injection          

 

 Injection        Nayana Velarde M.D.  2012



   Injection          

 

 Injection        Nayana M JIMMY Velarde  2012



   Injection          

 

 Injection        Nayana M JIMMY Velarde  10/23/2012



   Injection          

 

 Injection        Nayana Velarde M.D.  10/02/2012



   Injection          

 

 Injection        Nayana Velarde M.D.  2012



   Injection          

 

 Injection        Nayana M JIMMY Velarde  2012



   Injection          

 

 Injection        Nayana Velarde M.D.  2012



   Injection          

 

 Injection        Nayana Velarde M.D.  2012



   Injection          

 

 Injection        Nayana M JIMMY Velarde  2012



   Injection          

 

 Injection        Nayana M JIMMY Velarde  07/10/2012



   Injection          

 

 Injection        Nayana Velarde M.D.  2012



   Injection          

 

 Injection        Nayana Velarde M.D.  2012



   Injection          

 

 Injection        Nayana M JIMMY Velarde  2012



   Injection          

 

 Injection        Nayana M JIMMY Velarde  05/15/2012



   Injection          

 

 Injection        Nayana Velarde M.D.  2012



   Injection          

 

 Injection        Nayana Velarde M.D.  2012



   Injection          

 

 Injection        Nayana Velarde M.D.  04/10/2012



   Injection          

 

 Injection        Nayana Velarde M.D.  2012



   Injection          

 

 Injection        Nayana Velarde M.D.  2012



   Injection          

 

 Injection        Elliot Rubinstein, M.D.  2012



   Injection          

 

 Injection        Robbi Rubinstein, M.DLiz  2012



   Injection          

 

 Injection        August Anna, M.DLiz  12/15/2011



   Injection          

 

 Injection        Robbi Rubinstein, M.DLiz  2011



   Injection          

 

 Injection        Robbi Rubinstein, M.D.  2011



   Injection          

 

 Injection        Robbi Rubinstein, M.D.  2011



   Injection          

 

 Injection        Robbi Rubinstein, M.D.  10/13/2011



   Injection          

 

 Injection        Robbi Rubinstein, M.D.  2011



   Injection          

 

 Injection        Robbi Rubinstein, M.D.  2011



   Injection          

 

 Injection        Robbi Rubinstein, M.DLiz  2011



   Injection          

 

 Injection        Robbi Rubinstein, M.DLiz  2011



   Injection          

 

 Injection        Robbi Rubinstein, M.D.  2011



   Injection          

 

 Injection        Robbi Rubinstein, M.D.  2011



   Injection          

 

 Injection        Robbi Rubinstein, M.D.  2011



   Injection          

 

 Injection        Robbi Rubinstein, M.DLiz  2011



   Injection          

 

 Injection        Robbi Rubinstein, M.D.  2011



   Injection          

 

 Injection        Robbi Rubinstein, M.D.  2011



   Injection          

 

 Injection        Robbi Rubinstein, M.DLiz  2011



   Injection          

 

 Injection        Robbi Rubinstein, M.D.  2011



   Injection          

 

 Injection        Robbi Rubinstein, M.D.  2011



   Injection          

 

 Injection        Robbi Rubinstein, M.D.  2011



   Injection          

 

 Injection        Robbi Rubinstein, M.D.  2011



   Injection          

 

 Injection        Robbi Rubinstein, M.D.  2010



   Injection          

 

 Injection        Robbi Rubinstein, M.D.  2010



   Injection          

 

 Injection        Robbi Rubinstein, M.D.  10/12/2010



   Injection          

 

 Injection        Robbi Rubinstein, M.DLiz  2010



   Injection          

 

 Injection        Robbi Rubinstein, M.DLiz  2010



   Injection          

 

 Injection        Robbi Rubinstein, M.DLiz  2010



   Injection          

 

 Injection        Robbi Rubinstein, M.DLiz  08/10/2010



   Injection          

 

 Injection        Nayana Velarde M.D.  2010



   Injection          

 

 Injection        Robbi Rubinstein, M.D.  2010



   Injection          

 

 Injection        Robbi Rubinstein, M.DLiz  2010



   Injection          

 

 Injection        Robbi Rubinstein, M.DLiz  06/15/2010



   Injection          

 

 Injection        Robbi Rubinstein, M.D.  2010



   Injection          

 

 Injection        Robbi Rubinstein, M.DLiz  2010



   Injection          

 

 Injection        Robbi Rubinstein, M.DLiz  2010



   Injection          

 

 Injection        Robbi Rubinstein, M.DLiz  2010



   Injection          

 

 Injection        Robbi Rubinstein, M.DLiz  2010



   Injection          

 

 Injection        Robbi Rubinstein, M.DLiz  2010



   Injection          

 

 Injection        Robbi Rubinstein, M.DLiz  2010



   Injection          

 

 Injection        Robbi Rubinstein, M.DLiz  2010



   Injection          

 

 Injection        Nayana Velarde M.D.  2010



   Injection          

 

 Injection        Curt Sánchez,  2010



   Injection        M.DLiz  

 

 Injection        Robbi Rubinstein, GISELADLiz  2010



   Injection          

 

 Injection        Robbi Rubinstein, M.DLiz  2009



   Injection          

 

 Injection        Curt Sánchez,  12/10/2009



   Injection        M.DLiz  

 

 Injection        Robbi Rubinstein, MLizDLiz  2009



   Injection          

 

 Injection        Robbi Rubinstein, GISELADLiz  11/10/2009



   Injection          

 

 Injection        Robbi Rubinstein, M.DLiz  10/27/2009



   Injection          

 

 Injection        Robbi Rubinstein, M.DLiz  10/08/2009



   Injection          

 

 Injection        Robbi Rubinstein, M.DLiz  2009



   Injection          

 

 Injection        Robbi Rubinstein, M.DLiz  09/10/2009



   Injection          

 

 Injection        Robbi Rubinstein, M.DLiz  2009



   Injection          

 

 Injection        Robbi Rubinstein, M.DLiz  2009



   Injection          

 

 Injection        Robbi Rubinstein, M.DLiz  2009



   Injection          

 

 Injection        Robbi Rubinstein, M.DLiz  2009



   Injection          

 

 Injection        Robbi Rubinstein, M.DLiz  2009



   Injection          

 

 Injection        Curt Sánchez,  2009



   Injection        M.DLiz  

 

 Injection        Robbi Rubinstein, M.DLiz  2009



   Injection          

 

 Injection        Robbi Rubinstein, MCARMENCITA  2009



   Injection          

 

 Injection        Robbi Rubinstein, M.DLiz  2009



   Injection          

 

 Injection        Robbi Rubinstein, M.DLiz  2009



   Injection          

 

 Injection        Robbi Rubinstein, M.DLiz  2009



   Injection          

 

 Injection        Robbi Rubinstein, M.DLiz  2009



   Injection          

 

 Injection        Robbi Rubinstein, M.DLiz  2009



   Injection          

 

 Injection        Robbi Rubinstein, M.DLiz  2009



   Injection          

 

 Injection        Robbi Rubinstein, M.DLiz  2009



   Injection          

 

 Injection        Robbi Rubinstein, M.DLiz  2009



   Injection          

 

 Injection        Robbi Rubinstein, GISELADLiz  2009



   Injection          

 

 Injection        Robbi Rubinstein, M.DLiz  2009



   Injection          

 

 Injection        Robbi Rubinstein, M.DLiz  01/15/2009



   Injection          

 

 Injection        Robbi Rubinstein, GILBERT.DLiz  2008



   Injection          

 

 Injection        Robbi Rubinstein, M.D.  2008



   Injection          

 

 Injection        Robbi Rubinstein, GISELADLiz  2008



   Injection          

 

 Injection        Robbi Rubinstein, GILBERT.DLiz  2008



   Injection          

 

 Injection        Robbi Rubinstein, M.D.  2008



   Injection          

 

 Injection        Robbi Rubinstein, GISELADLiz  2008



   Injection          

 

 Injection        Robbi Rubinstein, MLizDLiz  10/30/2008



   Injection          

 

 Injection        Robbi Rubinstein, M.DLiz  10/23/2008



   Injection          

 

 Injection        Robbi Rubinstein, M.D.  10/16/2008



   Injection          

 

 Injection        Robbi Rubinstein, GILBERT.DLiz  10/09/2008



   Injection          

 

 Injection        Robbi Rubinstein, M.DLiz  10/02/2008



   Injection          

 

 Injection        Robbi Rubinstein, M.DLiz  2008



   Injection          

 

 Injection        Robbi Rubinstein, MLizDLiz  2008



   Injection          

 

 Injection        Robbi Rubinstein, MLizDLiz  2008



   Injection          

 

 Injection        Robbi Rubinstein, M.DLiz  2008



   Injection          

 

 Injection        Robbi Rubinstein, M.DLiz  2008



   Injection          

 

 Injection        Robbi Rubinstein, M.D.  2008



   Injection          

 

 Injection        Robbi Rubinstein, MCARMENCITA  2008



   Injection          

 

 Injection        Robbit Rubinstein, M.D.  2008



   Injection          

 

 Injection        Robbi Rubinstein, M.D.  2008



   Injection          

 

 Injection        Robbi Rubinstein, M.D.  2008



   Injection          

 

 Injection        Robbi Rubinstein, M.D.  07/15/2008



   Injection          

 

 Injection        Robbi Rubinstein, M.D.  07/10/2008



   Injection          

 

 Injection        Robbit Rubinstein, M.D.  2008



   Injection          

 

 Injection        Robbi Rubinstein, M.D.  2008



   Injection          

 

 Injection        Robbi Rubinstein, M.D.  2008



   Injection          

 

 Injection        Robbi Rubinstein, M.D.  2008



   Injection          

 

 Injection        Robbi Rubinstein, M.D.  2008



   Injection          

 

 Injection        Robbi Rubinstein, M.D.  2008



   Injection          

 

 Injection        Robbit Rubinstein, M.D.  2008



   Injection          

 

 Injection        Elliot Rubinstein, M.D.  2008



   Injection          

 

 Injection        Elliot Rubinstein, M.D.  2008



   Injection          

 

 Injection        Elliot Rubinstein, M.D.  2008



   Injection          







Immunizations







 CPT Code  Status  Date  Vaccine  Lot #

 

 [id number]  Given  10/01/2013  Influenza Vaccine  

 

 18746  Given  2011  Pneumococcal Vaccine  

 

 04902  Given  Unknown  Pneumococcal Vaccine  

 

 40072  Given  Unknown  Influenza Vaccine  

 

 82648  Given  Unknown  Influenza Vaccine  

 

 08089  Given  Unknown  Influenza Vaccine  

 

 09192  Given  Unknown  Influenza Vaccine  

 

 90695  Given  Unknown  Influenza Vaccine  







Vital Signs







 Date  Vital  Result  Comment

 

 2020  1:28pm  Height  65 inches  5'5"









 Weight  241.00 lb  

 

 Weight  109.318 kg  

 

 Respiratory Rate  18 /min  

 

 Heart Rate  89 /min  

 

 O2 % BldC Oximetry  97 %  

 

 BP Systolic  135 mmHg  

 

 BP Diastolic  66 mmHg  

 

 Asthma Control Test  14  

 

 BMI (Body Mass Index)  40.1 kg/m2  









 2020 11:18am  Height  65 inches  5'5"









 Weight  241.00 lb  

 

 Weight  109.318 kg  

 

 Respiratory Rate  16 /min  

 

 Heart Rate  87 /min  

 

 O2 % BldC Oximetry  95 %  

 

 BP Systolic  135 mmHg  

 

 BP Diastolic  64 mmHg  

 

 Asthma Control Test  10  

 

 BMI (Body Mass Index)  40.1 kg/m2  







Results







 Description

 

 No Information Available







Procedures







 Date  Code  Description  Status

 

 2020  13326  Biologic Agent Administration  Completed

 

 2020  65850  Ippb  Completed

 

 2020  55857  Biologic Agent Administration  Completed

 

 2020  78142  Therapeutic, Prophylactic Or Diagnostic Injection Subq/Im  
Completed

 

 2020  70802  Biologic Agent Administration  Completed

 

 2020  01256  Therapeutic, Prophylactic Or Diagnostic Injection Subq/Im  
Completed

 

 2020  58515  Ippb  Completed

 

 2019  19427  Biologic Agent Administration  Completed

 

 2019  40812  Therapeutic, Prophylactic Or Diagnostic Injection Subq/Im  
Completed

 

 2019  55237  Biologic Agent Administration  Completed

 

 2019  98482  Therapeutic, Prophylactic Or Diagnostic Injection Subq/Im  
Completed

 

 2019  23159  Ippb  Completed

 

 2019  51433  Therapeutic, Prophylactic Or Diagnostic Injection Subq/Im  
Completed

 

 2019  52994  Biologic Agent Administration  Completed

 

 2019  68140  Biologic Agent Administration  Completed

 

 2019  84146  Therapeutic, Prophylactic Or Diagnostic Injection Subq/Im  
Completed

 

 10/23/2019  77708  Biologic Agent Administration  Completed

 

 10/23/2019  48890  Therapeutic, Prophylactic Or Diagnostic Injection Subq/Im  
Completed

 

 10/09/2019  92108  Biologic Agent Administration  Completed

 

 10/09/2019  26458  Therapeutic, Prophylactic Or Diagnostic Injection Subq/Im  
Completed

 

 2019  61927  Therapeutic, Prophylactic Or Diagnostic Injection Subq/Im  
Completed

 

 2019  33545  Biologic Agent Administration  Completed

 

 2019  90981  Therapeutic, Prophylactic Or Diagnostic Injection Subq/Im  
Completed

 

 2019  78957  Biologic Agent Administration  Completed

 

 2019  98735  Therapeutic, Prophylactic Or Diagnostic Injection Subq/Im  
Completed







Medical Devices







 Description

 

 No Information Available







Encounters







 Type  Date  Location  Provider  Dx  Diagnosis

 

 Office Visit  2019  [facility]  Vanita Alcazar  J45.50  Severe 
persistent



   11:20a    FNP-C    asthma, uncomplicated









 J30.1  Allergic rhinitis due to pollen

 

 J45.50  Severe persistent asthma, uncomplicated

 

 J30.2  Other seasonal allergic rhinitis

 

 J30.81  Allergic rhinitis due to animal (cat) (dog) hair and dander

 

 J30.1  Allergic rhinitis due to pollen

 

 J30.2  Other seasonal allergic rhinitis

 

 J30.81  Allergic rhinitis due to animal (cat) (dog) hair and dander

 

 J30.89  Other allergic rhinitis







Assessments







 Date  Code  Description  Provider

 

 2020  J45.51  Severe persistent asthma with (acute)  Vanita Uldrich, FNP
-C



     exacerbation  

 

 2020  J30.81  Allergic rhinitis due to animal (cat) (dog)  Vanita 
Uldrich, FNP-C



     hair and dander  

 

 2020  J30.89  Other allergic rhinitis  Vanita Uldrich, FNP-C

 

 2020  J30.1  Allergic rhinitis due to pollen  Vanita Uldrich, FNP-C

 

 2020  J45.51  Severe persistent asthma with (acute)  Nayana Velarde M.D.



     exacerbation  

 

 2020  J45.51  Severe persistent asthma with (acute)  Abigail Horowitz, FNP-C



     exacerbation  

 

 2020  J20.9  Acute bronchitis, unspecified  Abigail Horowitz, FNP-C

 

 2020  J45.50  Severe persistent asthma, uncomplicated  Nayana Velarde M.D.

 

 2020  J45.50  Severe persistent asthma, uncomplicated  Nayana Velarde M.D.

 

 2020  J45.50  Severe persistent asthma, uncomplicated  Vanita Uldrich, 
FNP-C

 

 2020  J30.2  Other seasonal allergic rhinitis  Nayana Velarde M.D.

 

 2020  J30.2  Other seasonal allergic rhinitis  Vanita Uldrich, FNP-C

 

 2020  J30.81  Allergic rhinitis due to animal (cat) (dog)  Nayana Velarde M.D.



     hair and dander  

 

 2020  J30.81  Allergic rhinitis due to animal (cat) (dog)  Vanita 
Uldrich, FNP-C



     hair and dander  

 

 2020  J30.89  Other allergic rhinitis  Nayana Velarde M.D.

 

 2020  J30.89  Other allergic rhinitis  Vanita Uldrich, FNP-C

 

 2020  J30.1  Allergic rhinitis due to pollen  Vanita Uldrich, FNP-C

 

 2020  J45.50  Severe persistent asthma, uncomplicated  Nayana Velarde M.D.

 

 2020  J45.50  Severe persistent asthma, uncomplicated  Vanita Uldrich, 
FNP-C

 

 2020  J30.2  Other seasonal allergic rhinitis  Nayana Velarde M.D.

 

 2020  J30.2  Other seasonal allergic rhinitis  Vanita Uldrich, FNP-C

 

 2020  J30.81  Allergic rhinitis due to animal (cat) (dog)  Nayana Velarde M.D.



     hair and dander  

 

 2020  J30.81  Allergic rhinitis due to animal (cat) (dog)  Vanita 
Uldrich, FNP-C



     hair and dander  

 

 2020  J30.89  Other allergic rhinitis  Nayana Velarde M.D.

 

 2020  J30.89  Other allergic rhinitis  Vanita Uldrich, FNP-C

 

 2020  J30.1  Allergic rhinitis due to pollen  Vanita Uldrich, FNP-C

 

 2019  J45.50  Severe persistent asthma, uncomplicated  Vanita Uldrich, 
FNP-C

 

 2019  J30.2  Other seasonal allergic rhinitis  Nayana Velarde M.D.

 

 2019  J30.2  Other seasonal allergic rhinitis  Vanita Uldrich, FNP-C

 

 2019  J30.81  Allergic rhinitis due to animal (cat) (dog)  Nayana Velarde M.D.



     hair and dander  

 

 2019  J30.81  Allergic rhinitis due to animal (cat) (dog)  Vanita 
Uldrich, FNP-C



     hair and dander  

 

 2019  J30.89  Other allergic rhinitis  Nayana Velarde M.D.

 

 2019  J30.89  Other allergic rhinitis  Vanita Uldrich, FNP-C

 

 2019  J30.1  Allergic rhinitis due to pollen  Nayana Velarde M.D.

 

 2019  J30.1  Allergic rhinitis due to pollen  Vanita Uldrich, FNP-C

 

 2019  J45.50  Severe persistent asthma, uncomplicated  Vanita Uldrich, 
FNP-C

 

 2019  J45.50  Severe persistent asthma, uncomplicated  Nayana Velarde M.D.

 

 2019  J30.2  Other seasonal allergic rhinitis  Vanita Uldrich, FNP-C

 

 2019  J45.50  Severe persistent asthma, uncomplicated  Vanita Uldrich, 
FNP-C

 

 2019  J30.81  Allergic rhinitis due to animal (cat) (dog)  Vanita 
Uldrich, FNP-C



     hair and dander  

 

 2019  J30.2  Other seasonal allergic rhinitis  Nayana Velarde M.D.

 

 2019  J30.89  Other allergic rhinitis  Vanita Uldrich, FNP-C

 

 2019  J30.2  Other seasonal allergic rhinitis  Vanita Uldrich, FNP-C

 

 2019  J30.81  Allergic rhinitis due to animal (cat) (dog)  Nayana Velarde M.D.



     hair and dander  

 

 2019  J30.81  Allergic rhinitis due to animal (cat) (dog)  Vanita 
Uldrich, FNP-C



     hair and dander  

 

 2019  J30.89  Other allergic rhinitis  Nayana Velarde M.D.

 

 2019  J30.89  Other allergic rhinitis  Vanita Uldrich, FNP-C

 

 2019  J30.1  Allergic rhinitis due to pollen  Vanita Uldrich, FNP-C

 

 2019  J45.50  Severe persistent asthma, uncomplicated  Vanita Uldrich, 
FNP-C

 

 2019  J45.50  Severe persistent asthma, uncomplicated  Nayana Velarde M.D.

 

 2019  J45.50  Severe persistent asthma, uncomplicated  Vanita Uldrich, 
FNP-C

 

 2019  J30.2  Other seasonal allergic rhinitis  Nayana Velarde M.D.

 

 2019  J30.2  Other seasonal allergic rhinitis  Vanita Uldrich, FNP-C

 

 2019  J30.81  Allergic rhinitis due to animal (cat) (dog)  Nayana Velarde M.D.



     hair and dander  

 

 2019  J30.81  Allergic rhinitis due to animal (cat) (dog)  Vnaita 
Uldrich, FNP-C



     hair and dander  

 

 2019  J30.89  Other allergic rhinitis  Nayana Velarde M.D.

 

 2019  J30.89  Other allergic rhinitis  Vanita Uldrich, FNP-C

 

 2019  J30.1  Allergic rhinitis due to pollen  Vanita Uldrich, FNP-C

 

 2019  J45.50  Severe persistent asthma, uncomplicated  Vanita Uldrich, 
FNP-C

 

 2019  J45.50  Severe persistent asthma, uncomplicated  Nayana Velarde M.D.

 

 2019  J45.50  Severe persistent asthma, uncomplicated  Vanita Uldrich, 
FNP-C

 

 2019  J30.2  Other seasonal allergic rhinitis  Nayana Velarde M.D.

 

 2019  J30.2  Other seasonal allergic rhinitis  Vanita Uldrich, FNP-C

 

 2019  J30.81  Allergic rhinitis due to animal (cat) (dog)  Nayana Velarde M.D.



     hair and dander  

 

 2019  J30.81  Allergic rhinitis due to animal (cat) (dog)  Vanita 
Uldrich, FNP-C



     hair and dander  

 

 2019  J30.89  Other allergic rhinitis  Nayana Velarde M.D.

 

 2019  J30.89  Other allergic rhinitis  Vanita Uldrich, FNP-C

 

 2019  J30.1  Allergic rhinitis due to pollen  Vanita Uldrich, FNP-C

 

 10/23/2019  J45.50  Severe persistent asthma, uncomplicated  Vanita Uldrich, 
FNP-C

 

 10/23/2019  J30.2  Other seasonal allergic rhinitis  Vanita Uldrich, FNP-C

 

 10/23/2019  J30.81  Allergic rhinitis due to animal (cat) (dog)  Vanita 
Uldrich, FNP-C



     hair and dander  

 

 10/23/2019  J30.89  Other allergic rhinitis  Vanita Uldrich, FNP-C

 

 10/23/2019  J30.1  Allergic rhinitis due to pollen  Vanita Uldrich, FNP-C

 

 10/09/2019  J45.50  Severe persistent asthma, uncomplicated  Vanita Uldrich, 
FNP-C

 

 10/09/2019  J45.50  Severe persistent asthma, uncomplicated  Nayana Velarde M.D.

 

 10/09/2019  J45.50  Severe persistent asthma, uncomplicated  Vanita Uldrich, 
FNP-C

 

 10/09/2019  J30.2  Other seasonal allergic rhinitis  Nayana Velarde M.D.

 

 10/09/2019  J30.2  Other seasonal allergic rhinitis  Vanita Uldrich, FNP-C

 

 10/09/2019  J30.81  Allergic rhinitis due to animal (cat) (dog)  Nayana Velarde M.D.



     hair and dander  

 

 10/09/2019  J30.81  Allergic rhinitis due to animal (cat) (dog)  Vanita 
Uldrich, FNP-C



     hair and dander  

 

 10/09/2019  J30.89  Other allergic rhinitis  Nayana Velarde M.D.

 

 10/09/2019  J30.89  Other allergic rhinitis  Vanita Uldrich, FNP-C

 

 2019  J45.50  Severe persistent asthma, uncomplicated  Vanita Uldrich, 
FNP-C

 

 2019  J45.50  Severe persistent asthma, uncomplicated  Nayana Velarde M.D.

 

 2019  J45.50  Severe persistent asthma, uncomplicated  Vanita Uldrich, 
FNP-C

 

 2019  J30.1  Allergic rhinitis due to pollen  Nayana Velarde M.D.

 

 2019  J30.1  Allergic rhinitis due to pollen  Vanita Ulich, FNP-C

 

 2019  J30.2  Other seasonal allergic rhinitis  Nayana Vealrde M.D.

 

 2019  J30.2  Other seasonal allergic rhinitis  Vanita Uldrich, FNP-C

 

 2019  J30.81  Allergic rhinitis due to animal (cat) (dog)  Nayana Velarde M.D.



     hair and dander  

 

 2019  J30.81  Allergic rhinitis due to animal (cat) (dog)  Vanita 
Uldrich, FNP-C



     hair and dander  

 

 2019  J30.89  Other allergic rhinitis  Vanita Uldrich, FNP-C

 

 2019  J45.50  Severe persistent asthma, uncomplicated  Vanita Uldrich, 
FNP-C

 

 2019  J45.50  Severe persistent asthma, uncomplicated  Nayana Velarde M.D.

 

 2019  J45.50  Severe persistent asthma, uncomplicated  Vanita Uldrich, 
FNP-C

 

 2019  J30.1  Allergic rhinitis due to pollen  Nayana Velarde M.D.

 

 2019  J30.1  Allergic rhinitis due to pollen  Vanita Uldrich, FNP-C

 

 2019  J30.2  Other seasonal allergic rhinitis  Nayana Velarde M.D.

 

 2019  J30.2  Other seasonal allergic rhinitis  Vanita Uldrich, FNP-C

 

 2019  J30.81  Allergic rhinitis due to animal (cat) (dog)  Nayana Velarde M.D.



     hair and dander  

 

 2019  J30.81  Allergic rhinitis due to animal (cat) (dog)  Vanita 
Uldrich, FNP-C



     hair and dander  

 

 2019  J30.89  Other allergic rhinitis  Nayana Velarde M.D.

 

 2019  J30.89  Other allergic rhinitis  Vanita Uldrich, FNP-C

 

 2019  J45.50  Severe persistent asthma, uncomplicated  Vanita Uldrich, 
FNP-C

 

 2019  J45.50  Severe persistent asthma, uncomplicated  Nayana Velarde M.D.

 

 2019  J30.1  Allergic rhinitis due to pollen  Vanita Uldrich, FNP-C

 

 2019  J45.50  Severe persistent asthma, uncomplicated  Vanita Uldrich, 
FNP-C

 

 2019  J30.2  Other seasonal allergic rhinitis  Vanita Uldrich, FNP-C

 

 2019  J30.1  Allergic rhinitis due to pollen  Nayana Velarde M.D.

 

 2019  J30.81  Allergic rhinitis due to animal (cat) (dog)  Vanita 
Uldrich, FNP-C



     hair and dander  

 

 2019  J30.1  Allergic rhinitis due to pollen  Vanita Uldrich, FNP-C

 

 2019  J30.2  Other seasonal allergic rhinitis  Nayana Velarde M.D.

 

 2019  J30.2  Other seasonal allergic rhinitis  Vanita Uldrich, FNP-C

 

 2019  J30.81  Allergic rhinitis due to animal (cat) (dog)  Nayana Velarde M.D.



     hair and dander  

 

 2019  J30.81  Allergic rhinitis due to animal (cat) (dog)  FRANKLIN Mariscal



     hair and dander  

 

 2019  J30.89  Other allergic rhinitis  FRANKLIN Mariscal







Plan of Treatment

2020 - MARIAN Mariscal-CJ45.51 Severe persistent asthma with (acute) 
yxvdtswhvmixP11.81 Allergic rhinitis due to animal (cat) (dog) hair and 
ioorubN51.89 Other allergic nmmjcipdM60.1 Allergic rhinitis due to pollenNew 
Medication:Prednisone 10 mgRecommendations:Continue all medications as 
prescribed.Refrain from wearing perfumes/scented colognes while visitingour 
office. Give Prednisone 60mg today then Start the Prednisone tomorrow   OK for 
Xolair( 225 mg every 2 weeks) today   Continue the Dulera 2 puffs twice a day  
Continue the Spiriva 1.25 mcg 2 puffs daily  Continue the Azelestine 2 sprays 
daily  Continue the fluticasone nasal spray daily  Continue the cetirizine 1 
daily  Continue the montelukast 1 daily  Continue the Ventolin 2 puffs every 4 
hours as needed for cough, shortness of breath, chest tightness, shortness of 
breath, wheezing, or chest congestion.  Xolair in 2 weeks



Functional Status







 Description

 

 No Information Available







Mental Status







 Description

 

 No Information Available







Referrals







 Description

 

 No Information Available

## 2020-02-28 ENCOUNTER — HOSPITAL ENCOUNTER (EMERGENCY)
Dept: HOSPITAL 25 - UCCORT | Age: 70
Discharge: HOME | End: 2020-02-28
Payer: MEDICARE

## 2020-02-28 VITALS — SYSTOLIC BLOOD PRESSURE: 107 MMHG | DIASTOLIC BLOOD PRESSURE: 61 MMHG

## 2020-02-28 DIAGNOSIS — Z87.891: ICD-10-CM
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
## 2020-02-28 DIAGNOSIS — Z88.6: ICD-10-CM

## 2020-02-28 DIAGNOSIS — E07.9: ICD-10-CM

## 2020-02-28 DIAGNOSIS — Z79.82: ICD-10-CM

## 2020-02-28 DIAGNOSIS — Z79.52: ICD-10-CM

## 2020-02-28 DIAGNOSIS — S51.812A: Primary | ICD-10-CM

## 2020-02-28 DIAGNOSIS — J44.9: ICD-10-CM

## 2020-02-28 DIAGNOSIS — Z88.0: ICD-10-CM

## 2020-02-28 DIAGNOSIS — Z91.09: ICD-10-CM

## 2020-02-28 DIAGNOSIS — Z79.890: ICD-10-CM
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
## 2020-02-28 PROCEDURE — 99212 OFFICE O/P EST SF 10 MIN: CPT

## 2020-02-28 PROCEDURE — G0463 HOSPITAL OUTPT CLINIC VISIT: HCPCS

## 2020-02-28 NOTE — UC
Skin Complaint HPI





- HPI Summary


HPI Summary: 





Pt presents with c/o of left forearm wound that has "blood leaking through" 

bandage that was applied here on 2/27/20.  Pt was seen yesterday for skin tear 

sustained through "bumping" arm. Pt is concerned because she saw blood "leaking 

our from under bandage this morning"  





- History of Current Complaint


Chief Complaint: UCSkin


Time Seen by Provider: 02/28/20 10:54


Stated Complaint: LEFT FOREARM LACERATION F/U


Hx Obtained From: Patient


Pregnant?: No


Onset/Duration: Sudden Onset, Still Present


Skin Exposure Onset/Duration: Hours Ago


Timing: Constant


Onset Severity: Moderate


Current Severity: Mild


Pain Intensity: 4


Location: Discrete - left distal posterior forearm


Character: Raised


Aggravating Factor(s): Touch


Alleviating Factor(s): Nothing


Associated Signs & Symptoms: Positive: Tenderness


Related History: Trauma - minor, trauma





- Allergy/Home Medications


Allergies/Adverse Reactions: 


 Allergies











Allergy/AdvReac Type Severity Reaction Status Date / Time


 


Penicillins Allergy  Hives Verified 02/27/20 11:56


 


piroxicam [From Feldene] Allergy  Blisters Verified 02/27/20 11:56


 


environmental Allergy  Difficulty Uncoded 02/27/20 11:56





   Breathing  











Home Medications: 


 Home Medications





Aspirin [Aspirin Adult Low Dose] 81 mg PO QPM 07/01/15 [History Confirmed 02/28/ 20]


Azelastine 0.15% NASAL(NF) [Astepro 0.15% NASAL (NF)] 1 spray NASAL BID 07/01/

15 [History Confirmed 02/28/20]


Irbesartan (NF) [Avapro (NF)] 75 mg PO QPM 07/01/15 [History Confirmed 02/28/20]


LevoCETirizine TAB (NF) [Xyzal TAB (NF)] 5 mg PO QPM 07/01/15 [History 

Confirmed 02/28/20]


Levothyroxine TAB* [Synthroid TAB*] 125 mcg PO DAILY 07/01/15 [History 

Confirmed 02/28/20]


Montelukast Sodium TAB* [Singulair TAB*] 10 mg PO QPM 07/01/15 [History 

Confirmed 02/28/20]


Albuterol HFA INHALER* [Ventolin HFA Inhaler*] 2 puff INH Q4H PRN 04/20/18 [

History Confirmed 02/28/20]


Allopurinol TAB* [Zyloprim 100 MG TAB*] 300 mg PO DAILY 04/20/18 [History 

Confirmed 02/28/20]


Atorvastatin* [Lipitor*] 20 mg PO QPM 04/20/18 [History Confirmed 02/28/20]


Furosemide TAB* [Lasix TAB*] 80 mg PO BID 04/20/18 [History Confirmed 02/28/20]


Nystatin CREAM* 1 applic TOPICAL TID PRN 04/20/18 [History Confirmed 02/28/20]


Pantoprazole TAB * [Protonix TAB (NF)] 40 mg PO QPM 04/20/18 [History Confirmed 

02/28/20]


Potassium Chlor TAB* [Klor Con ER TAB*] 10 meq PO DAILY 04/20/18 [History 

Confirmed 02/28/20]


Spironolactone TAB* [Aldactone TAB*] 12.5 mg PO DAILY 04/20/18 [History 

Confirmed 02/28/20]


Tiotropium CAPSULE (NF) [Spiriva CAP.INH*] 2 cap.inh INH DAILY 04/20/18 [

History Confirmed 02/28/20]


Acetaminophen [Pain Relief] 500 mg PO DAILY 02/27/20 [History Confirmed 02/28/20

]


Fluticas/Salmet 230/21 HFA(NF) [Advair HFA 23O/21 (NF)] 2 puff INH BID 02/27/20 

[History Confirmed 02/28/20]


Ipratropium/Albuterol Sulfate [Iprat-Albut 0.5-3(2.5) mg/3 ml] 3 ml INH BID 02/ 27/20 [History Confirmed 02/28/20]


Iron Transfusion 1 dose IV SEE INSTRUCTIONS 02/27/20 [History Confirmed 02/28/20

]


Magnesium Oxide [Magnesium] 400 mg PO DAILY 02/27/20 [History Confirmed 02/28/20

]











PMH/Surg Hx/FS Hx/Imm Hx


Previously Healthy: Yes


Endocrine History: Thyroid Disease


Cardiovascular History: Cardiac Disease


Respiratory History: COPD, Asthma





- Surgical History


Surgical History: Yes


Surgery Procedure, Year, and Place: stents.  T&A.  marta.  appy.  L carotid 

[place] of tellez coil.  Kidney stone blasted.  9/2015 cardiac bypass.  mitral 

valve repair





- Family History


Known Family History: Positive: Cardiac Disease, Hypertension





- Social History


Occupation: Retired


Lives: With Family


Alcohol Use: Occasionally


Substance Use Type: None


Smoking Status (MU): Former Smoker


Type: Cigarettes


When Did the Patient Quit Smoking/Using Tobacco: 1970





- Immunization History


Most Recent Influenza Vaccination: 2014


Most Recent Tetanus Shot: 2012


Most Recent Pneumonia Vaccination: current


Vaccination Up to Date: Yes





Review of Systems


All Other Systems Reviewed And Are Negative: Yes


Constitutional: Positive: Negative


Skin: Positive: Bruising, Other - skin tear,


Eyes: Positive: Negative


ENT: Positive: Negative


Respiratory: Positive: Negative


Cardiovascular: Positive: Negative


Gastrointestinal: Positive: Negative


Genitourinary: Positive: Negative


Motor: Positive: Negative


Neurovascular: Positive: Negative


Musculoskeletal: Positive: Edema


Neurological/Mental Status: Positive: Negative


Psychological: Positive: Negative


Is Patient Immunocompromised?: No





Physical Exam


Triage Information Reviewed: Yes


Appearance: Well-Appearing


Vital Signs: 


 Initial Vital Signs











Temp  97.1 F   02/28/20 10:30


 


Pulse  86   02/28/20 10:30


 


Resp  20   02/28/20 10:30


 


BP  107/61   02/28/20 10:30


 


Pulse Ox  99   02/28/20 10:30











Vital Signs Reviewed: Yes


Eye Exam: Normal


ENT: Positive: Hearing grossly normal


Dental Exam: Normal


Neck exam: Normal


Respiratory: Positive: No respiratory distress


Musculoskeletal: Positive: Edema @ - mild swelling left distal posterior forearm


Neurological Exam: Normal


Psychological Exam: Normal


Skin: Positive: Significant Lesion(s) - skin tear ~ 3cm X 4 cm





Course/Dx





- Differential Diagnoses - Skin Complaint


Differential Diagnoses: Other - skin tear





- Diagnoses


Provider Diagnosis: 


 Skin tear of left forearm without complication








Discharge ED





- Sign-Out/Discharge


Documenting (check all that apply): Patient Departure


All imaging exams completed and their final reports reviewed: No Studies





- Discharge Plan


Condition: Stable


Disposition: HOME


Patient Education Materials:  Skin Tear (ED)


Referrals: 


Nicole Murdock MD [Primary Care Provider] - If Needed


Additional Instructions: 


You may return to clinic for wound check and dressing change on 3/2/20 at  or 

go to your PCP. 





- Billing Disposition and Condition


Condition: STABLE


Disposition: Home

## 2020-03-02 ENCOUNTER — HOSPITAL ENCOUNTER (EMERGENCY)
Dept: HOSPITAL 25 - UCCORT | Age: 70
Discharge: HOME | End: 2020-03-02
Payer: MEDICARE

## 2020-03-02 VITALS — SYSTOLIC BLOOD PRESSURE: 141 MMHG | DIASTOLIC BLOOD PRESSURE: 48 MMHG

## 2020-03-02 DIAGNOSIS — J44.9: ICD-10-CM

## 2020-03-02 DIAGNOSIS — Z91.09: ICD-10-CM

## 2020-03-02 DIAGNOSIS — I10: ICD-10-CM

## 2020-03-02 DIAGNOSIS — Z79.82: ICD-10-CM

## 2020-03-02 DIAGNOSIS — Z88.0: ICD-10-CM

## 2020-03-02 DIAGNOSIS — Z87.891: ICD-10-CM
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
## 2020-03-02 DIAGNOSIS — S51.812D: Primary | ICD-10-CM

## 2020-03-02 DIAGNOSIS — Z79.899: ICD-10-CM

## 2020-03-02 DIAGNOSIS — Z88.6: ICD-10-CM

## 2020-03-02 PROCEDURE — 99212 OFFICE O/P EST SF 10 MIN: CPT

## 2020-03-02 PROCEDURE — G0463 HOSPITAL OUTPT CLINIC VISIT: HCPCS
